# Patient Record
Sex: MALE | Race: WHITE | Employment: UNEMPLOYED | URBAN - METROPOLITAN AREA
[De-identification: names, ages, dates, MRNs, and addresses within clinical notes are randomized per-mention and may not be internally consistent; named-entity substitution may affect disease eponyms.]

---

## 2018-12-02 LAB
ALBUMIN SERPL-MCNC: 3.7 G/DL (ref 3.5–5)
ALBUMIN/GLOB SERPL: 1 {RATIO} (ref 1.1–2.2)
ALP SERPL-CCNC: 67 U/L (ref 45–117)
ALT SERPL-CCNC: 31 U/L (ref 12–78)
ANION GAP SERPL CALC-SCNC: 6 MMOL/L (ref 5–15)
AST SERPL-CCNC: 18 U/L (ref 15–37)
BASOPHILS # BLD: 0.1 K/UL (ref 0–0.1)
BASOPHILS NFR BLD: 1 % (ref 0–1)
BILIRUB SERPL-MCNC: 0.5 MG/DL (ref 0.2–1)
BUN SERPL-MCNC: 15 MG/DL (ref 6–20)
BUN/CREAT SERPL: 15 (ref 12–20)
CALCIUM SERPL-MCNC: 8.8 MG/DL (ref 8.5–10.1)
CHLORIDE SERPL-SCNC: 106 MMOL/L (ref 97–108)
CO2 SERPL-SCNC: 28 MMOL/L (ref 21–32)
CREAT SERPL-MCNC: 1.02 MG/DL (ref 0.7–1.3)
DIFFERENTIAL METHOD BLD: ABNORMAL
EOSINOPHIL # BLD: 0.6 K/UL (ref 0–0.4)
EOSINOPHIL NFR BLD: 4 % (ref 0–7)
ERYTHROCYTE [DISTWIDTH] IN BLOOD BY AUTOMATED COUNT: 13.9 % (ref 11.5–14.5)
GLOBULIN SER CALC-MCNC: 3.8 G/DL (ref 2–4)
GLUCOSE SERPL-MCNC: 89 MG/DL (ref 65–100)
HCT VFR BLD AUTO: 50 % (ref 36.6–50.3)
HGB BLD-MCNC: 16.6 G/DL (ref 12.1–17)
IMM GRANULOCYTES # BLD: 0 K/UL (ref 0–0.04)
IMM GRANULOCYTES NFR BLD AUTO: 0 % (ref 0–0.5)
LYMPHOCYTES # BLD: 3.1 K/UL (ref 0.8–3.5)
LYMPHOCYTES NFR BLD: 22 % (ref 12–49)
MCH RBC QN AUTO: 30.7 PG (ref 26–34)
MCHC RBC AUTO-ENTMCNC: 33.2 G/DL (ref 30–36.5)
MCV RBC AUTO: 92.4 FL (ref 80–99)
MONOCYTES # BLD: 1.1 K/UL (ref 0–1)
MONOCYTES NFR BLD: 8 % (ref 5–13)
NEUTS SEG # BLD: 8.9 K/UL (ref 1.8–8)
NEUTS SEG NFR BLD: 65 % (ref 32–75)
NRBC # BLD: 0 K/UL (ref 0–0.01)
NRBC BLD-RTO: 0 PER 100 WBC
PLATELET # BLD AUTO: 374 K/UL (ref 150–400)
PMV BLD AUTO: 9.9 FL (ref 8.9–12.9)
POTASSIUM SERPL-SCNC: 4.1 MMOL/L (ref 3.5–5.1)
PROT SERPL-MCNC: 7.5 G/DL (ref 6.4–8.2)
RBC # BLD AUTO: 5.41 M/UL (ref 4.1–5.7)
SODIUM SERPL-SCNC: 140 MMOL/L (ref 136–145)
WBC # BLD AUTO: 13.7 K/UL (ref 4.1–11.1)

## 2018-12-02 PROCEDURE — 80053 COMPREHEN METABOLIC PANEL: CPT

## 2018-12-02 PROCEDURE — 99284 EMERGENCY DEPT VISIT MOD MDM: CPT

## 2018-12-02 PROCEDURE — 86850 RBC ANTIBODY SCREEN: CPT

## 2018-12-02 PROCEDURE — 96365 THER/PROPH/DIAG IV INF INIT: CPT

## 2018-12-02 PROCEDURE — 96375 TX/PRO/DX INJ NEW DRUG ADDON: CPT

## 2018-12-02 PROCEDURE — 36415 COLL VENOUS BLD VENIPUNCTURE: CPT

## 2018-12-02 PROCEDURE — 85025 COMPLETE CBC W/AUTO DIFF WBC: CPT

## 2018-12-03 ENCOUNTER — APPOINTMENT (OUTPATIENT)
Dept: CT IMAGING | Age: 58
DRG: 392 | End: 2018-12-03
Attending: EMERGENCY MEDICINE
Payer: MEDICARE

## 2018-12-03 ENCOUNTER — HOSPITAL ENCOUNTER (INPATIENT)
Age: 58
LOS: 3 days | Discharge: HOME | DRG: 392 | End: 2018-12-06
Attending: EMERGENCY MEDICINE | Admitting: INTERNAL MEDICINE
Payer: MEDICARE

## 2018-12-03 DIAGNOSIS — K92.2 ACUTE LOWER GI BLEEDING: Primary | ICD-10-CM

## 2018-12-03 DIAGNOSIS — K52.9 COLITIS: ICD-10-CM

## 2018-12-03 PROBLEM — I10 HTN (HYPERTENSION): Status: ACTIVE | Noted: 2018-12-03

## 2018-12-03 PROBLEM — D72.829 LEUKOCYTOSIS: Status: ACTIVE | Noted: 2018-12-03

## 2018-12-03 LAB
ABO + RH BLD: NORMAL
APTT PPP: 39.4 SEC (ref 22.1–32)
BLOOD GROUP ANTIBODIES SERPL: NORMAL
HCT VFR BLD AUTO: 47.8 % (ref 36.6–50.3)
HCT VFR BLD AUTO: 49.4 % (ref 36.6–50.3)
HEMOCCULT STL QL: POSITIVE
HGB BLD-MCNC: 15.9 G/DL (ref 12.1–17)
HGB BLD-MCNC: 16.5 G/DL (ref 12.1–17)
INR PPP: 1 (ref 0.9–1.1)
LACTATE SERPL-SCNC: 0.5 MMOL/L (ref 0.4–2)
PROTHROMBIN TIME: 10.2 SEC (ref 9–11.1)
SPECIMEN EXP DATE BLD: NORMAL
THERAPEUTIC RANGE,PTTT: ABNORMAL SECS (ref 58–77)
WBC #/AREA STL HPF: NORMAL /HPF (ref 0–4)

## 2018-12-03 PROCEDURE — 74011636320 HC RX REV CODE- 636/320: Performed by: EMERGENCY MEDICINE

## 2018-12-03 PROCEDURE — 74011250637 HC RX REV CODE- 250/637: Performed by: INTERNAL MEDICINE

## 2018-12-03 PROCEDURE — 83605 ASSAY OF LACTIC ACID: CPT

## 2018-12-03 PROCEDURE — 74011250636 HC RX REV CODE- 250/636: Performed by: INTERNAL MEDICINE

## 2018-12-03 PROCEDURE — 85730 THROMBOPLASTIN TIME PARTIAL: CPT

## 2018-12-03 PROCEDURE — 74177 CT ABD & PELVIS W/CONTRAST: CPT

## 2018-12-03 PROCEDURE — 74011250636 HC RX REV CODE- 250/636: Performed by: EMERGENCY MEDICINE

## 2018-12-03 PROCEDURE — 74011250636 HC RX REV CODE- 250/636: Performed by: FAMILY MEDICINE

## 2018-12-03 PROCEDURE — 85014 HEMATOCRIT: CPT

## 2018-12-03 PROCEDURE — 85610 PROTHROMBIN TIME: CPT

## 2018-12-03 PROCEDURE — 36415 COLL VENOUS BLD VENIPUNCTURE: CPT

## 2018-12-03 PROCEDURE — 82272 OCCULT BLD FECES 1-3 TESTS: CPT

## 2018-12-03 PROCEDURE — 89055 LEUKOCYTE ASSESSMENT FECAL: CPT

## 2018-12-03 PROCEDURE — 65270000015 HC RM PRIVATE ONCOLOGY

## 2018-12-03 PROCEDURE — 87449 NOS EACH ORGANISM AG IA: CPT

## 2018-12-03 PROCEDURE — 87046 STOOL CULTR AEROBIC BACT EA: CPT

## 2018-12-03 RX ORDER — METRONIDAZOLE 500 MG/100ML
500 INJECTION, SOLUTION INTRAVENOUS EVERY 8 HOURS
Status: DISCONTINUED | OUTPATIENT
Start: 2018-12-03 | End: 2018-12-03

## 2018-12-03 RX ORDER — FENTANYL CITRATE 50 UG/ML
25 INJECTION, SOLUTION INTRAMUSCULAR; INTRAVENOUS
Status: COMPLETED | OUTPATIENT
Start: 2018-12-03 | End: 2018-12-03

## 2018-12-03 RX ORDER — SODIUM CHLORIDE 0.9 % (FLUSH) 0.9 %
5-10 SYRINGE (ML) INJECTION AS NEEDED
Status: DISCONTINUED | OUTPATIENT
Start: 2018-12-03 | End: 2018-12-06 | Stop reason: HOSPADM

## 2018-12-03 RX ORDER — PANTOPRAZOLE SODIUM 40 MG/1
40 GRANULE, DELAYED RELEASE ORAL
Status: DISCONTINUED | OUTPATIENT
Start: 2018-12-03 | End: 2018-12-06 | Stop reason: HOSPADM

## 2018-12-03 RX ORDER — SODIUM CHLORIDE 0.9 % (FLUSH) 0.9 %
5-10 SYRINGE (ML) INJECTION EVERY 8 HOURS
Status: DISCONTINUED | OUTPATIENT
Start: 2018-12-03 | End: 2018-12-06 | Stop reason: HOSPADM

## 2018-12-03 RX ORDER — ACETAMINOPHEN 325 MG/1
650 TABLET ORAL
Status: DISCONTINUED | OUTPATIENT
Start: 2018-12-03 | End: 2018-12-06 | Stop reason: HOSPADM

## 2018-12-03 RX ORDER — HYDRALAZINE HYDROCHLORIDE 20 MG/ML
5 INJECTION INTRAMUSCULAR; INTRAVENOUS
Status: DISCONTINUED | OUTPATIENT
Start: 2018-12-03 | End: 2018-12-06 | Stop reason: HOSPADM

## 2018-12-03 RX ORDER — SODIUM CHLORIDE 9 MG/ML
50 INJECTION, SOLUTION INTRAVENOUS
Status: COMPLETED | OUTPATIENT
Start: 2018-12-03 | End: 2018-12-03

## 2018-12-03 RX ORDER — SODIUM CHLORIDE 0.9 % (FLUSH) 0.9 %
10 SYRINGE (ML) INJECTION
Status: COMPLETED | OUTPATIENT
Start: 2018-12-03 | End: 2018-12-03

## 2018-12-03 RX ORDER — ONDANSETRON 2 MG/ML
4 INJECTION INTRAMUSCULAR; INTRAVENOUS
Status: DISCONTINUED | OUTPATIENT
Start: 2018-12-03 | End: 2018-12-06 | Stop reason: HOSPADM

## 2018-12-03 RX ORDER — SODIUM CHLORIDE 0.9 % (FLUSH) 0.9 %
5-10 SYRINGE (ML) INJECTION AS NEEDED
Status: DISCONTINUED | OUTPATIENT
Start: 2018-12-03 | End: 2018-12-03

## 2018-12-03 RX ORDER — FENTANYL CITRATE 50 UG/ML
25 INJECTION, SOLUTION INTRAMUSCULAR; INTRAVENOUS
Status: DISCONTINUED | OUTPATIENT
Start: 2018-12-03 | End: 2018-12-06 | Stop reason: HOSPADM

## 2018-12-03 RX ORDER — SODIUM CHLORIDE 9 MG/ML
75 INJECTION, SOLUTION INTRAVENOUS CONTINUOUS
Status: DISCONTINUED | OUTPATIENT
Start: 2018-12-03 | End: 2018-12-06 | Stop reason: HOSPADM

## 2018-12-03 RX ORDER — SODIUM CHLORIDE 0.9 % (FLUSH) 0.9 %
5-10 SYRINGE (ML) INJECTION EVERY 8 HOURS
Status: DISCONTINUED | OUTPATIENT
Start: 2018-12-03 | End: 2018-12-03

## 2018-12-03 RX ORDER — METRONIDAZOLE 500 MG/100ML
500 INJECTION, SOLUTION INTRAVENOUS EVERY 12 HOURS
Status: DISCONTINUED | OUTPATIENT
Start: 2018-12-03 | End: 2018-12-06

## 2018-12-03 RX ORDER — LEVOFLOXACIN 5 MG/ML
750 INJECTION, SOLUTION INTRAVENOUS EVERY 24 HOURS
Status: DISCONTINUED | OUTPATIENT
Start: 2018-12-03 | End: 2018-12-06

## 2018-12-03 RX ADMIN — PANTOPRAZOLE SODIUM 40 MG: 40 GRANULE, DELAYED RELEASE ORAL at 07:57

## 2018-12-03 RX ADMIN — ONDANSETRON 4 MG: 2 INJECTION INTRAMUSCULAR; INTRAVENOUS at 08:19

## 2018-12-03 RX ADMIN — Medication 10 ML: at 15:45

## 2018-12-03 RX ADMIN — METRONIDAZOLE 500 MG: 500 INJECTION, SOLUTION INTRAVENOUS at 19:40

## 2018-12-03 RX ADMIN — FENTANYL CITRATE 25 MCG: 50 INJECTION, SOLUTION INTRAMUSCULAR; INTRAVENOUS at 17:06

## 2018-12-03 RX ADMIN — LEVOFLOXACIN 750 MG: 5 INJECTION, SOLUTION INTRAVENOUS at 06:26

## 2018-12-03 RX ADMIN — FENTANYL CITRATE 25 MCG: 50 INJECTION, SOLUTION INTRAMUSCULAR; INTRAVENOUS at 12:20

## 2018-12-03 RX ADMIN — SODIUM CHLORIDE 50 ML/HR: 900 INJECTION, SOLUTION INTRAVENOUS at 03:59

## 2018-12-03 RX ADMIN — FENTANYL CITRATE 25 MCG: 50 INJECTION, SOLUTION INTRAMUSCULAR; INTRAVENOUS at 21:01

## 2018-12-03 RX ADMIN — Medication 10 ML: at 05:24

## 2018-12-03 RX ADMIN — IOPAMIDOL 100 ML: 755 INJECTION, SOLUTION INTRAVENOUS at 03:59

## 2018-12-03 RX ADMIN — FENTANYL CITRATE 25 MCG: 50 INJECTION, SOLUTION INTRAMUSCULAR; INTRAVENOUS at 05:13

## 2018-12-03 RX ADMIN — Medication 10 ML: at 03:59

## 2018-12-03 RX ADMIN — SODIUM CHLORIDE 1000 ML: 900 INJECTION, SOLUTION INTRAVENOUS at 06:23

## 2018-12-03 RX ADMIN — SODIUM CHLORIDE 75 ML/HR: 900 INJECTION, SOLUTION INTRAVENOUS at 09:24

## 2018-12-03 RX ADMIN — Medication 10 ML: at 22:00

## 2018-12-03 RX ADMIN — FENTANYL CITRATE 25 MCG: 50 INJECTION, SOLUTION INTRAMUSCULAR; INTRAVENOUS at 08:20

## 2018-12-03 RX ADMIN — METRONIDAZOLE 500 MG: 500 INJECTION, SOLUTION INTRAVENOUS at 05:24

## 2018-12-03 NOTE — ED NOTES
Bedside and Verbal shift change report given to Leni (oncoming nurse) by Phu Dugan (offgoing nurse). Report included the following information SBAR, ED Summary, MAR and Recent Results.

## 2018-12-03 NOTE — PROGRESS NOTES
Oncology Interdisciplinary rounds were held today to discuss patient plan of care and outcomes. The following members were present: Nursing, Case Management, Pharmacy and Dietary. Actual Length of Stay: 0 Expected Length of Stay: - - - Plan               Mobility         Discharge Plan Pending CDIF results IVF Gi consult Up ad [facility] Home 12/6

## 2018-12-03 NOTE — PROGRESS NOTES
Pharmacy Automatic Renal Dosing Protocol - Antimicrobials Indication for Antimicrobials: Colitis Current Regimen of Each Antimicrobial: 
Metronidazole 500mg IV q8h  (Start Date 12/3; Day # 1) Levofloxacin 750mg IV q24h; start 12/3; Day #1 Previous Antimicrobial Therapy: 
 
 
Significant Cultures:  
12/3: Stool = (pending) 12/3: C.diff = (pending) Radiology / Imaging results: (X-ray, CT scan or MRI):  
12/3: CT Abd: Proximal infectious versus inflammatory colitis. No abscess. Paralysis, amputations, malnutrition: n/a Labs: 
Recent Labs 18 CREA 1.02  
BUN 15 WBC 13.7* Temp (24hrs), Av.6 °F (37 °C), Min:97.9 °F (36.6 °C), Max:99.3 °F (37.4 °C) Creatinine Clearance (mL/min) or Dialysis: 83 ml/min Impression/Plan:  
· Change Metronidazole to 500mg IV q12h per protocol · Continue Levofloxacin 750mg IV q24h · Antimicrobial stop date TBD Pharmacy will follow daily and adjust medications as appropriate for renal function and/or serum levels. Thank you, 
Denver Balo, [facility] Recommended duration of therapy 
http://[facility]/[place]/[facility]/Pharmacy/Clinical%20Companion/Duration%20of%20ABX%20therapy. docx Renal Dosing 
http://[facility]/[place]/[facility]/Pharmacy/Clinical%20Companion/Renal%[id number]. pdf

## 2018-12-03 NOTE — ED NOTES
TRANSFER - OUT REPORT: 
 
Verbal report given to Rafi PEDRO(name) on [address]  being transferred to [address] [place] [address] (unit) for routine progression of care Report consisted of patients Situation, Background, Assessment and  
Recommendations(SBAR). Information from the following report(s) ED Summary and MAR was reviewed with the receiving nurse. Lines:  
Peripheral IV 12/03/18 Right Antecubital (Active) Site Assessment Clean, dry, & intact 12/3/2018  3:46 AM  
Phlebitis Assessment 0 12/3/2018  3:46 AM  
Infiltration Assessment 0 12/3/2018  3:46 AM  
Dressing Status Clean, dry, & intact 12/3/2018  3:46 AM  
Dressing Type Transparent 12/3/2018  3:46 AM  
Hub Color/Line Status Pink 12/3/2018  3:46 AM  
Action Taken Blood drawn 12/3/2018  3:46 AM  
  
 
Opportunity for questions and clarification was provided. Patient transported with: 
 Monitor not needed for medical admit Will transport via w/c with IVF infusing

## 2018-12-03 NOTE — ED PROVIDER NOTES
EMERGENCY DEPARTMENT HISTORY AND PHYSICAL EXAM 
 
 
Date: 12/3/2018 Patient Name: Raissa Christopher. History of Presenting Illness Chief Complaint Patient presents with  Rectal Bleeding  
  bright red x 3 weeks with L upper abdominal pain History Provided By: Patient HPI: Raissa Meraz is a 60 y.o. male, who presents ambulatory to the ED c/o multiple episodes of hematochezia x3 days. Pt states his sxs initially onset x3 weeks ago with generalized abdominal pain. He states the week prior he had been taking 2-4 Ibuprofen a day for chronic back pain. Pt also notes his first episodes of blood in his stool were dark in color. He states the episodes subsided until last night, prompting his visit to the ED. He reports 5-6 episodes of bright red blood within the past 5 hours of being in the waiting room. He additionally reports associated mild dizziness and mild nausea. Pt notes he is not followed by GI, but did undergo a colonoscopy at [facility] ~9 years ago. Pt denies any hx of hemorrhoids. Pt specifically denies any recent fever, chills, vomiting, diarrhea, CP, SOB, lightheadedness, numbness, weakness, tingling, BLE swelling, HA, heart palpitations, urinary sxs, changes in BM, changes in PO intake, cough, or congestion. PCP: None PMHx: Significant for DDD PSHx: Significant for cholecystectomy Social Hx: +tobacco (1PPD), +EtOH (Occasionally) , -Illicit Drugs There are no other complaints, changes, or physical findings at this time. Past History Past Medical History: 
Past Medical History:  
Diagnosis Date  Ill-defined condition   
 degenerative disc disease Past Surgical History: 
Past Surgical History:  
Procedure Laterality Date  HX GI    
 galbladder removal  
 
 
Family History: 
History reviewed. No pertinent family history. Social History: 
Social History Tobacco Use  Smoking status: Current Every Day Smoker   Packs/day: 1.00  
  Smokeless tobacco: Never Used Substance Use Topics  Alcohol use: Yes Comment: rarely  Drug use: Yes Types: Marijuana Allergies: Allergies Allergen Reactions  Aspirin Other (comments) Nose bleeds  Morphine Other (comments) Agitation Review of Systems Review of Systems Constitutional: Negative for chills and fever. HENT: Negative for congestion, ear pain, rhinorrhea and sore throat. Respiratory: Negative for cough and shortness of breath. Cardiovascular: Negative for chest pain, palpitations and leg swelling. Gastrointestinal: Positive for abdominal pain, blood in stool and nausea. Negative for constipation, diarrhea and vomiting. Melena 
hematochezia Endocrine: Negative for polyuria. Genitourinary: Negative for dysuria, frequency and hematuria. Neurological: Positive for dizziness. Negative for weakness, light-headedness, numbness and headaches. No tingling All other systems reviewed and are negative. Physical Exam  
Physical Exam 
General appearance - overweight, well appearing, and in no distress Eyes - pupils equal and reactive, extraocular eye movements intact ENT - mucous membranes moist, pharynx normal without lesions Neck - supple, no significant adenopathy; non-tender to palpation Chest - clear to auscultation, no wheezes, rales or rhonchi; non-tender to palpation Heart - normal rate and regular rhythm, S1 and S2 normal, no murmurs noted Abdomen - soft, generalized abdominal tenderness, nondistended, no masses or organomegaly Musculoskeletal - no joint tenderness, deformity or swelling; normal ROM Extremities - peripheral pulses normal, no pedal edema Skin - normal coloration and turgor, no rashes Neurological - alert, oriented x3, normal speech, no focal findings or movement disorder noted Written by MELANY Tubbs, as dictated by Tomy Baker MD 
 
Diagnostic Study Results Labs - 
  
 Recent Results (from the past 12 hour(s)) CBC WITH AUTOMATED DIFF Collection Time: 12/02/18  9:23 PM  
Result Value Ref Range WBC 13.7 (H) 4.1 - 11.1 K/uL  
 RBC 5.41 4.10 - 5.70 M/uL  
 HGB 16.6 12.1 - 17.0 g/dL HCT 50.0 36.6 - 50.3 % MCV 92.4 80.0 - 99.0 FL  
 MCH 30.7 26.0 - 34.0 PG  
 MCHC 33.2 30.0 - 36.5 g/dL  
 RDW 13.9 11.5 - 14.5 % PLATELET 043 613 - 687 K/uL MPV 9.9 8.9 - 12.9 FL  
 NRBC 0.0 0  WBC ABSOLUTE NRBC 0.00 0.00 - 0.01 K/uL NEUTROPHILS 65 32 - 75 % LYMPHOCYTES 22 12 - 49 % MONOCYTES 8 5 - 13 % EOSINOPHILS 4 0 - 7 % BASOPHILS 1 0 - 1 % IMMATURE GRANULOCYTES 0 0.0 - 0.5 % ABS. NEUTROPHILS 8.9 (H) 1.8 - 8.0 K/UL  
 ABS. LYMPHOCYTES 3.1 0.8 - 3.5 K/UL  
 ABS. MONOCYTES 1.1 (H) 0.0 - 1.0 K/UL  
 ABS. EOSINOPHILS 0.6 (H) 0.0 - 0.4 K/UL  
 ABS. BASOPHILS 0.1 0.0 - 0.1 K/UL  
 ABS. IMM. GRANS. 0.0 0.00 - 0.04 K/UL  
 DF AUTOMATED METABOLIC PANEL, COMPREHENSIVE Collection Time: 12/02/18  9:23 PM  
Result Value Ref Range Sodium 140 136 - 145 mmol/L Potassium 4.1 3.5 - 5.1 mmol/L Chloride 106 97 - 108 mmol/L  
 CO2 28 21 - 32 mmol/L Anion gap 6 5 - 15 mmol/L Glucose 89 65 - 100 mg/dL BUN 15 6 - 20 MG/DL Creatinine 1.02 0.70 - 1.30 MG/DL  
 BUN/Creatinine ratio 15 12 - 20 GFR est AA >60 >60 ml/min/1.73m2 GFR est non-AA >60 >60 ml/min/1.73m2 Calcium 8.8 8.5 - 10.1 MG/DL Bilirubin, total 0.5 0.2 - 1.0 MG/DL  
 ALT (SGPT) 31 12 - 78 U/L  
 AST (SGOT) 18 15 - 37 U/L Alk. phosphatase 67 45 - 117 U/L Protein, total 7.5 6.4 - 8.2 g/dL Albumin 3.7 3.5 - 5.0 g/dL Globulin 3.8 2.0 - 4.0 g/dL A-G Ratio 1.0 (L) 1.1 - 2.2 TYPE & SCREEN Collection Time: 12/02/18  9:23 PM  
Result Value Ref Range Crossmatch Expiration 12/05/2018 ABO/Rh(D) O POSITIVE Antibody screen NEG PROTHROMBIN TIME + INR Collection Time: 12/03/18  3:47 AM  
Result Value Ref Range INR 1.0 0.9 - 1.1 Prothrombin time 10.2 9.0 - 11.1 sec PTT Collection Time: 12/03/18  3:47 AM  
Result Value Ref Range aPTT 39.4 (H) 22.1 - 32.0 sec  
 aPTT, therapeutic range     58.0 - 77.0 SECS  
OCCULT BLOOD, STOOL Collection Time: 12/03/18  4:55 AM  
Result Value Ref Range Occult blood, stool POSITIVE (A) NEG    
LACTIC ACID Collection Time: 12/03/18  5:20 AM  
Result Value Ref Range Lactic acid 0.5 0.4 - 2.0 MMOL/L Radiologic Studies -  
CT ABD PELV W CONT Final Result CT Results  (Last 48 hours) 12/03/18 0359  CT ABD PELV W CONT Final result Impression:  IMPRESSION:  
Proximal infectious versus inflammatory colitis. No abscess. Narrative:  EXAM:  CT ABD PELV W CONT INDICATION: Left upper quadrant abdominal pain and rectal bleeding for 3 weeks. COMPARISON: None. CONTRAST:  100 mL of Isovue-370. TECHNIQUE:   
Following the uneventful intravenous administration of contrast, thin axial  
images were obtained through the abdomen and pelvis. Coronal and sagittal  
reconstructions were generated. Oral contrast was not administered. CT dose  
reduction was achieved through use of a standardized protocol tailored for this  
examination and automatic exposure control for dose modulation. FINDINGS:   
LUNG BASES: Clear. INCIDENTALLY IMAGED HEART AND MEDIASTINUM: Unremarkable. LIVER: No mass or biliary dilatation. GALLBLADDER: Cholecystectomy. CBD is not dilated. SPLEEN: Normal size and enhancement. PANCREAS: No mass or ductal dilatation. ADRENALS: Unremarkable. KIDNEYS: No mass, calculus, or hydronephrosis. STOMACH: Partial distention with fluid. SMALL BOWEL: No dilatation or wall thickening. COLON: Mural thickening of the colon involves the transverse colon and ascending  
colon. APPENDIX: Unremarkable. PERITONEUM: No ascites or pneumoperitoneum. RETROPERITONEUM: No lymphadenopathy or aortic aneurysm. REPRODUCTIVE ORGANS: Prostate gland is not enlarged. URINARY BLADDER: No mass or calculus. BONES: No destructive bone lesion. ADDITIONAL COMMENTS: N/A  
   
  
  
 
CXR Results  (Last 48 hours) None Medical Decision Making I am the first provider for this patient. I reviewed the vital signs, available nursing notes, past medical history, past surgical history, family history and social history. Vital Signs-Reviewed the patient's vital signs. Patient Vitals for the past 12 hrs: 
 Temp Pulse Resp BP SpO2  
12/03/18 0438     94 % 12/03/18 0437     94 % 12/03/18 0433    (!) 141/114   
12/03/18 0430     91 % 12/03/18 0415    (!) 169/122   
12/03/18 0330    (!) 134/99 94 % 12/03/18 0315    (!) 151/104 94 % 12/02/18 2119 97.9 °F (36.6 °C) 73 16 (!) 157/105 95 % Pulse Oximetry Analysis - 95% on RA Cardiac Monitor:  
Rate: 73bpm 
Rhythm: Normal Sinus Rhythm Records Reviewed: Nursing Notes, Old Medical Records, Previous Radiology Studies and Previous Laboratory Studies Provider Notes (Medical Decision Making): DDx: diverticulitis, AVM, Hemorrhoidal bleeding, bowel obstruction ED Course:  
Initial assessment performed. The patients presenting problems have been discussed, and they are in agreement with the care plan formulated and outlined with them. I have encouraged them to ask questions as they arise throughout their visit. Discussed the risks of smoking and the benefits of smoking cessation as well as the long term sequelae of smoking with the pt who verbalized his understanding. Reviewed strategies for success, including gradually decreasing the number of cigarettes smoked a day. Procedure Note - Rectal Exam:  
4:53 AM 
Performed by: Jordan Rios MD 
Rectal exam performed. Orange colored stool was collected. Stool was collected and sent to the lab for Hemoccult testing. The procedure took 1-15 minutes, and pt tolerated well. CONSULT NOTE:  
5:58 AM 
Parish Miguel MD spoke with Dr. Amanda Conti, Specialty: Hospitalist 
Discussed pt's hx, disposition, and available diagnostic and imaging results. Reviewed care plans. Consultant will evaluate pt for admission. Written by Efren Burnette ED Scribe, as dictated by Parish Miguel MD. Critical Care Time:  
 
none Disposition: 
 
Admit Note: 
5:58 AM 
Pt is being admitted by Dr. Amanda Conti. The results of their tests and reason(s) for their admission have been discussed with pt and/or available family. They convey agreement and understanding for the need to be admitted and for admission diagnosis. PLAN: 
1. Admission to the hospitalist  
 
Diagnosis Clinical Impression: 1. Acute lower GI bleeding 2. Colitis Attestations: This note is prepared by Efren Burnette, acting as Scribe for MD Trista Enamorado MD : The scribe's documentation has been prepared under my direction and personally reviewed by me in its entirety. I confirm that the note above accurately reflects all work, treatment, procedures, and medical decision making performed by me. This note will not be viewable in [address]

## 2018-12-03 NOTE — ED NOTES
Pt reports pain remains across low abd and low back. Requested pain med for 7/10 pain. Pt walked to bathroom with wife for stool specimen

## 2018-12-03 NOTE — PROGRESS NOTES
Oncology Nursing Communication Tool 10:47 AM 
12/3/2018 Bedside shift change report given to Monica Lo RN (incoming nurse) by Mica Wallis (outgoing nurse) on [facility] . . Report included the following information SBAR. Shift Summary: Pt requested PRN pain medication 3x for pain that is 7 out of 10 on pain scale. He rested comfortably for rest of day without complaint and no apparent signs of distress or discomfort. Issues for physician to address: Oncology Shift Note Admission Date 12/3/2018 Admission Diagnosis Colitis [K52.9] Code Status Full Code Consults IP CONSULT TO HOSPITALIST 
IP CONSULT TO GASTROENTEROLOGY Cardiac Monitoring [] Yes [] No  
  
Purposeful Hourly Rounding [] Yes   
Karlee Score Total Score: 0 Karlee score 3 or > [] Bed Alarm [] Avasys [] 1:1 sitter [] Patient refused (Place signed refusal form in chart) Pain Managed [] Yes [] No  
 Key Pain Meds The patient is on no pain meds. Influenza Vaccine Received Flu Vaccine for Current Season (usually Sept-March): Yes Oxygen needs? [] Room air Oxygen @  []1L    []2L    []3L   []4L    []5L   []6L Use home O2? [] Yes [] No 
Perform O2 challenge test using  smartphrase (.oxygenchallenge) Last bowel movement Last Bowel Movement Date: 12/03/18 
bowel movement Urinary Catheter LDAs Peripheral IV 12/03/18 Right Antecubital (Active) Site Assessment Clean, dry, & intact 12/3/2018  9:05 AM  
Phlebitis Assessment 0 12/3/2018  9:05 AM  
Infiltration Assessment 0 12/3/2018  9:05 AM  
Dressing Status Clean, dry, & intact 12/3/2018  9:05 AM  
Dressing Type Transparent;Tape 12/3/2018  9:05 AM  
Hub Color/Line Status Pink 12/3/2018  9:05 AM  
Action Taken Blood drawn 12/3/2018  3:46 AM  
                  
  
Readmission Risk Assessment Tool Score Low Risk   
      
 9 Total Score   
  
 9 Pt. Coverage (Medicare=5 , Medicaid, or Self-Pay=4) Criteria that do not apply:  
 Has Seen PCP in Last 6 Months (Yes=3, No=0) . Living with Significant Other. Assisted Living. LTAC. SNF. or  
Rehab Patient Length of Stay (>5 days = 3) IP Visits Last 12 Months (1-3=4, 4=9, >4=11) Charlson Comorbidity Score (Age + Comorbid Conditions) Expected Length of Stay - - - Actual Length of Stay 0 Doris Morse

## 2018-12-03 NOTE — ED NOTES
Pt reports having stomach pain in lower abdomen that wraps around. Pt reports having degenerative disc disease and that he has been having a flare up for 3 weeks. Pt reports rectal bleeding started 3 weeks ago and that it is currently bright red.

## 2018-12-03 NOTE — PROGRESS NOTES
Problem: Falls - Risk of 
Goal: *Absence of Falls Document Gonzalo Kins Fall Risk and appropriate interventions in the flowsheet. Outcome: Progressing Towards Goal 
Fall Risk Interventions: 
  
 
  
 
Medication Interventions: Patient to call before getting OOB, Teach patient to arise slowly

## 2018-12-03 NOTE — CONSULTS
GI Consultation Note Louie Rowell)    NAME: Ubaldo Rodriguez. : 1960 MRN: [id number]   ATTG: TOBIN Ray MD PCP: None  Date/Time:  12/3/2018 12:10 PM  Subjective:   REASON FOR CONSULT:      Danielle Schmid is a 60 y.o.  male who I was asked to see for abnl CT with noted bloody diarrhea. He presented with 3wk hx generalized crampy abdominal discomfort, worsening over the last few days, becoming associated with sparse and then more profuse diarrhea and bleeding. He denies improvement with sporadic use of NSAIDs as OP that he was using for back pain. He last had antibiotics as OP >9mo ago. .  . Pt also reported alternating contipation and diarrhea for past 2 weeks. He denies F/C, N/V, CP, SOB, or hematemesis. He last underwent colonoscopy >9yrs ago as OP for routine CRC screening that was reported as unremarkable. He denies sick exposires, recent travel, or raw foods. ER eval noted colitis by CT scan and leukocytosis. Past Medical History:   Diagnosis Date    HTN (hypertension) 12/3/2018    Ill-defined condition     degenerative disc disease      Past Surgical History:   Procedure Laterality Date    HX GI      galbladder removal     Social History     Tobacco Use    Smoking status: Current Every Day Smoker     Packs/day: 1.00    Smokeless tobacco: Never Used   Substance Use Topics    Alcohol use: Yes     Comment: rarely      History reviewed. No pertinent family history.    Allergies   Allergen Reactions    Aspirin Other (comments)     Nose bleeds    Morphine Other (comments)     Agitation      Home Medications:  None     Hospital medications:  Current Facility-Administered Medications   Medication Dose Route Frequency    levoFLOXacin (LEVAQUIN) 750 mg in D5W IVPB  750 mg IntraVENous Q24H    pantoprazole (PROTONIX) granules for oral suspension 40 mg  40 mg Oral ACB    0.9% sodium chloride infusion  75 mL/hr IntraVENous CONTINUOUS    sodium chloride (NS) flush 5-10 mL  5-10 mL IntraVENous Q8H    sodium chloride (NS) flush 5-10 mL  5-10 mL IntraVENous PRN    acetaminophen (TYLENOL) tablet 650 mg  650 mg Oral Q6H PRN    ondansetron (ZOFRAN) injection 4 mg  4 mg IntraVENous Q4H PRN    fentaNYL citrate (PF) injection 25 mcg  25 mcg IntraVENous Q4H PRN    hydrALAZINE (APRESOLINE) 20 mg/mL injection 5 mg  5 mg IntraVENous Q6H PRN    metroNIDAZOLE (FLAGYL) IVPB premix 500 mg  500 mg IntraVENous Q12H     REVIEW OF SYSTEMS:     [x]    Total of 11 systems reviewed as follows:  Const:   negative fever, negative chills, negative weight loss  Eyes:   negative diplopia or visual changes, negative eye pain  ENT:   negative coryza, negative sore throat  Resp:   negative cough, hemoptysis, dyspnea  Cards:  negative for chest pain, palpitations, lower extremity edema  :  negative for frequency, dysuria and hematuria  Skin:   negative for rash and pruritus  Heme:   negative for easy bruising and gum/nose bleeding  MS:  negative for myalgias, arthralgias, back pain and muscle weakness  Neurolo:  negative for headaches, dizziness, vertigo, memory problems   Psych:  negative for feelings of anxiety, depression     Pertinent Positives include :    Objective:   VITALS:    Visit Vitals  BP (!) 146/92 (BP 1 Location: Left arm, BP Patient Position: At rest)   Pulse 83   Temp 99.3 °F (37.4 °C)   Resp 18   Ht 5' 11\" (1.803 m)   Wt 96.6 kg (212 lb 15.4 oz)   SpO2 95%   BMI 29.70 kg/m²     Temp (24hrs), Av.6 °F (37 °C), Min:97.9 °F (36.6 °C), Max:99.3 °F (37.4 °C)    PHYSICAL EXAM:   General:    Alert, cooperative, no distress, appears stated age. Head:   Normocephalic, without obvious abnormality, atraumatic. Eyes:   Conjunctivae clear, anicteric sclerae. Pupils are equal  Nose:  Nares normal. No drainage or sinus tenderness. Throat:    Lips, mucosa, and tongue normal.  No Thrush  Neck:  Supple, symmetrical,  no adenopathy, thyroid: non tender  Back:    Symmetric,  No CVA tenderness. Lungs:   CTA bilaterally.   No wheezing/rhonchi/rales. Chest wall:  No tenderness or deformity. No Accessory muscle use. Heart:   Regular rate and rhythm,  no murmur, rub or gallop. Abdomen:   S, +Generalized abd tenderness. +ML scar. ND.  NABS. No masses  Extremities: Atraumatic, No cyanosis. No edema. No clubbing  Skin:     Texture, turgor normal. No rashes/lesions/jaundice  Lymph: Cervical, supraclavicular normal.  Psych:  Good insight. Not depressed. Not anxious or agitated. Neurologic: EOMs intact. No facial asymmetry/aphasia/slurred speech. Normal strength, A/O X 3. LAB DATA REVIEWED:    Recent Results (from the past 48 hour(s))   CBC WITH AUTOMATED DIFF    Collection Time: 12/02/18  9:23 PM   Result Value Ref Range    WBC 13.7 (H) 4.1 - 11.1 K/uL    RBC 5.41 4.10 - 5.70 M/uL    HGB 16.6 12.1 - 17.0 g/dL    HCT 50.0 36.6 - 50.3 %    MCV 92.4 80.0 - 99.0 FL    MCH 30.7 26.0 - 34.0 PG    MCHC 33.2 30.0 - 36.5 g/dL    RDW 13.9 11.5 - 14.5 %    PLATELET 115 260 - 852 K/uL    MPV 9.9 8.9 - 12.9 FL    NRBC 0.0 0  WBC    ABSOLUTE NRBC 0.00 0.00 - 0.01 K/uL    NEUTROPHILS 65 32 - 75 %    LYMPHOCYTES 22 12 - 49 %    MONOCYTES 8 5 - 13 %    EOSINOPHILS 4 0 - 7 %    BASOPHILS 1 0 - 1 %    IMMATURE GRANULOCYTES 0 0.0 - 0.5 %    ABS. NEUTROPHILS 8.9 (H) 1.8 - 8.0 K/UL    ABS. LYMPHOCYTES 3.1 0.8 - 3.5 K/UL    ABS. MONOCYTES 1.1 (H) 0.0 - 1.0 K/UL    ABS. EOSINOPHILS 0.6 (H) 0.0 - 0.4 K/UL    ABS. BASOPHILS 0.1 0.0 - 0.1 K/UL    ABS. IMM.  GRANS. 0.0 0.00 - 0.04 K/UL    DF AUTOMATED     METABOLIC PANEL, COMPREHENSIVE    Collection Time: 12/02/18  9:23 PM   Result Value Ref Range    Sodium 140 136 - 145 mmol/L    Potassium 4.1 3.5 - 5.1 mmol/L    Chloride 106 97 - 108 mmol/L    CO2 28 21 - 32 mmol/L    Anion gap 6 5 - 15 mmol/L    Glucose 89 65 - 100 mg/dL    BUN 15 6 - 20 MG/DL    Creatinine 1.02 0.70 - 1.30 MG/DL    BUN/Creatinine ratio 15 12 - 20      GFR est AA >60 >60 ml/min/1.73m2    GFR est non-AA >60 >60 ml/min/1.73m2    Calcium 8.8 8.5 - 10.1 MG/DL    Bilirubin, total 0.5 0.2 - 1.0 MG/DL    ALT (SGPT) 31 12 - 78 U/L    AST (SGOT) 18 15 - 37 U/L    Alk. phosphatase 67 45 - 117 U/L    Protein, total 7.5 6.4 - 8.2 g/dL    Albumin 3.7 3.5 - 5.0 g/dL    Globulin 3.8 2.0 - 4.0 g/dL    A-G Ratio 1.0 (L) 1.1 - 2.2     TYPE & SCREEN    Collection Time: 12/02/18  9:23 PM   Result Value Ref Range    Crossmatch Expiration 12/05/2018     ABO/Rh(D) Danika Newby POSITIVE     Antibody screen NEG    PROTHROMBIN TIME + INR    Collection Time: 12/03/18  3:47 AM   Result Value Ref Range    INR 1.0 0.9 - 1.1      Prothrombin time 10.2 9.0 - 11.1 sec   PTT    Collection Time: 12/03/18  3:47 AM   Result Value Ref Range    aPTT 39.4 (H) 22.1 - 32.0 sec    aPTT, therapeutic range     58.0 - 77.0 SECS   OCCULT BLOOD, STOOL    Collection Time: 12/03/18  4:55 AM   Result Value Ref Range    Occult blood, stool POSITIVE (A) NEG     LACTIC ACID    Collection Time: 12/03/18  5:20 AM   Result Value Ref Range    Lactic acid 0.5 0.4 - 2.0 MMOL/L   HGB & HCT    Collection Time: 12/03/18  6:58 AM   Result Value Ref Range    HGB 16.5 12.1 - 17.0 g/dL    HCT 49.4 36.6 - 50.3 %     IMAGING RESULTS:   [x]      I have personally reviewed the actual   []    CXR  [x]    CT  []     US  CT ABD PELV W CONT 12/3/18  FINDINGS:   LUNG BASES: Clear. INCIDENTALLY IMAGED HEART AND MEDIASTINUM: Unremarkable. LIVER: No mass or biliary dilatation. GALLBLADDER: Cholecystectomy. CBD is not dilated. SPLEEN: Normal size and enhancement. PANCREAS: No mass or ductal dilatation. ADRENALS: Unremarkable. KIDNEYS: No mass, calculus, or hydronephrosis. STOMACH: Partial distention with fluid. SMALL BOWEL: No dilatation or wall thickening. COLON: Mural thickening of the colon involves the transverse colon and ascending  colon. APPENDIX: Unremarkable. PERITONEUM: No ascites or pneumoperitoneum. RETROPERITONEUM: No lymphadenopathy or aortic aneurysm.   REPRODUCTIVE ORGANS: Prostate gland is not enlarged. URINARY BLADDER: No mass or calculus. BONES: No destructive bone lesion. IMPRESSION:  Proximal infectious versus inflammatory colitis. No abscess. Recommendations/Plan:      Active Problems:    Colitis (12/3/2018)      HTN (hypertension) (12/3/2018)      Leukocytosis (12/3/2018)       ___________________________________________________  RECOMMENDATIONS:    62yo M with abnl CT associated with generalized abd pain, leukocytosis, and bloody diarrhea. Suspect an infectious or ischemic colitis as source. CA or IBD less likely.   Plan:  1) IVF  2) CLD  3) IV Flagyl and Levaquin  4) Check stool  5) Check stool for fecal WBC, Cx, O&P, Cdif  6) Follow H/H  7) PRN narcotic analgesia  8) Consider for OP colonoscopy    Discussed Code Status:    [x]    Full Code      []    DNR    ___________________________________________________  Care Plan discussed with:    [x]    Patient   []    Family   []    Nursing   []    Attending  Total Time :  50   minutes   ___________________________________________________  GI: Benoit Carrillo MD

## 2018-12-04 LAB
ALBUMIN SERPL-MCNC: 3.5 G/DL (ref 3.5–5)
ALBUMIN/GLOB SERPL: 0.9 {RATIO} (ref 1.1–2.2)
ALP SERPL-CCNC: 67 U/L (ref 45–117)
ALT SERPL-CCNC: 27 U/L (ref 12–78)
ANION GAP SERPL CALC-SCNC: 7 MMOL/L (ref 5–15)
AST SERPL-CCNC: 18 U/L (ref 15–37)
BASOPHILS # BLD: 0.1 K/UL (ref 0–0.1)
BASOPHILS NFR BLD: 1 % (ref 0–1)
BILIRUB SERPL-MCNC: 0.7 MG/DL (ref 0.2–1)
BUN SERPL-MCNC: 8 MG/DL (ref 6–20)
BUN/CREAT SERPL: 9 (ref 12–20)
C DIFF GDH STL QL: NEGATIVE
C DIFF TOX A+B STL QL IA: NEGATIVE
CALCIUM SERPL-MCNC: 9 MG/DL (ref 8.5–10.1)
CHLORIDE SERPL-SCNC: 109 MMOL/L (ref 97–108)
CO2 SERPL-SCNC: 25 MMOL/L (ref 21–32)
CREAT SERPL-MCNC: 0.92 MG/DL (ref 0.7–1.3)
DIFFERENTIAL METHOD BLD: ABNORMAL
EOSINOPHIL # BLD: 0.5 K/UL (ref 0–0.4)
EOSINOPHIL NFR BLD: 5 % (ref 0–7)
ERYTHROCYTE [DISTWIDTH] IN BLOOD BY AUTOMATED COUNT: 13.6 % (ref 11.5–14.5)
GLOBULIN SER CALC-MCNC: 3.7 G/DL (ref 2–4)
GLUCOSE SERPL-MCNC: 106 MG/DL (ref 65–100)
HCT VFR BLD AUTO: 49.7 % (ref 36.6–50.3)
HGB BLD-MCNC: 16.5 G/DL (ref 12.1–17)
IMM GRANULOCYTES # BLD: 0 K/UL (ref 0–0.04)
IMM GRANULOCYTES NFR BLD AUTO: 0 % (ref 0–0.5)
INTERPRETATION: NORMAL
LYMPHOCYTES # BLD: 1.9 K/UL (ref 0.8–3.5)
LYMPHOCYTES NFR BLD: 19 % (ref 12–49)
MAGNESIUM SERPL-MCNC: 2.3 MG/DL (ref 1.6–2.4)
MCH RBC QN AUTO: 30.3 PG (ref 26–34)
MCHC RBC AUTO-ENTMCNC: 33.2 G/DL (ref 30–36.5)
MCV RBC AUTO: 91.2 FL (ref 80–99)
MONOCYTES # BLD: 0.9 K/UL (ref 0–1)
MONOCYTES NFR BLD: 9 % (ref 5–13)
NEUTS SEG # BLD: 6.7 K/UL (ref 1.8–8)
NEUTS SEG NFR BLD: 67 % (ref 32–75)
NRBC # BLD: 0 K/UL (ref 0–0.01)
NRBC BLD-RTO: 0 PER 100 WBC
PHOSPHATE SERPL-MCNC: 3.4 MG/DL (ref 2.6–4.7)
PLATELET # BLD AUTO: 348 K/UL (ref 150–400)
PMV BLD AUTO: 10.3 FL (ref 8.9–12.9)
POTASSIUM SERPL-SCNC: 3.9 MMOL/L (ref 3.5–5.1)
PROT SERPL-MCNC: 7.2 G/DL (ref 6.4–8.2)
RBC # BLD AUTO: 5.45 M/UL (ref 4.1–5.7)
SODIUM SERPL-SCNC: 141 MMOL/L (ref 136–145)
WBC # BLD AUTO: 10 K/UL (ref 4.1–11.1)

## 2018-12-04 PROCEDURE — 36415 COLL VENOUS BLD VENIPUNCTURE: CPT

## 2018-12-04 PROCEDURE — 74011250637 HC RX REV CODE- 250/637: Performed by: INTERNAL MEDICINE

## 2018-12-04 PROCEDURE — 74011250636 HC RX REV CODE- 250/636: Performed by: FAMILY MEDICINE

## 2018-12-04 PROCEDURE — 84100 ASSAY OF PHOSPHORUS: CPT

## 2018-12-04 PROCEDURE — 85025 COMPLETE CBC W/AUTO DIFF WBC: CPT

## 2018-12-04 PROCEDURE — 74011250636 HC RX REV CODE- 250/636: Performed by: INTERNAL MEDICINE

## 2018-12-04 PROCEDURE — 65270000015 HC RM PRIVATE ONCOLOGY

## 2018-12-04 PROCEDURE — 74011250636 HC RX REV CODE- 250/636: Performed by: EMERGENCY MEDICINE

## 2018-12-04 PROCEDURE — 80053 COMPREHEN METABOLIC PANEL: CPT

## 2018-12-04 PROCEDURE — 83735 ASSAY OF MAGNESIUM: CPT

## 2018-12-04 RX ADMIN — FENTANYL CITRATE 25 MCG: 50 INJECTION, SOLUTION INTRAMUSCULAR; INTRAVENOUS at 03:13

## 2018-12-04 RX ADMIN — FENTANYL CITRATE 25 MCG: 50 INJECTION, SOLUTION INTRAMUSCULAR; INTRAVENOUS at 07:51

## 2018-12-04 RX ADMIN — FENTANYL CITRATE 25 MCG: 50 INJECTION, SOLUTION INTRAMUSCULAR; INTRAVENOUS at 19:43

## 2018-12-04 RX ADMIN — METRONIDAZOLE 500 MG: 500 INJECTION, SOLUTION INTRAVENOUS at 19:42

## 2018-12-04 RX ADMIN — SODIUM CHLORIDE 75 ML/HR: 900 INJECTION, SOLUTION INTRAVENOUS at 13:10

## 2018-12-04 RX ADMIN — Medication 10 ML: at 06:00

## 2018-12-04 RX ADMIN — ONDANSETRON 4 MG: 2 INJECTION INTRAMUSCULAR; INTRAVENOUS at 03:23

## 2018-12-04 RX ADMIN — FENTANYL CITRATE 25 MCG: 50 INJECTION, SOLUTION INTRAMUSCULAR; INTRAVENOUS at 11:53

## 2018-12-04 RX ADMIN — PANTOPRAZOLE SODIUM 40 MG: 40 GRANULE, DELAYED RELEASE ORAL at 07:53

## 2018-12-04 RX ADMIN — METRONIDAZOLE 500 MG: 500 INJECTION, SOLUTION INTRAVENOUS at 07:55

## 2018-12-04 RX ADMIN — FENTANYL CITRATE 25 MCG: 50 INJECTION, SOLUTION INTRAMUSCULAR; INTRAVENOUS at 15:56

## 2018-12-04 RX ADMIN — FENTANYL CITRATE 25 MCG: 50 INJECTION, SOLUTION INTRAMUSCULAR; INTRAVENOUS at 23:51

## 2018-12-04 RX ADMIN — LEVOFLOXACIN 750 MG: 5 INJECTION, SOLUTION INTRAVENOUS at 05:59

## 2018-12-04 NOTE — PROGRESS NOTES
Oncology Nursing Communication Tool 6:54 AM 
12/4/2018 Bedside shift change report given to Holli Wilson (incoming nurse) by Kasey Syed (outgoing nurse) on [facility]. . Report included the following information SBAR, Kardex, Intake/Output, MAR and Recent Results. Shift Summary: fentanyl given x2 for abdominal pain. Iv abx given. Issues for physician to address: none Oncology Shift Note Admission Date 12/3/2018 Admission Diagnosis Colitis [K52.9] Code Status Full Code Consults IP CONSULT TO HOSPITALIST 
IP CONSULT TO GASTROENTEROLOGY Cardiac Monitoring [] Yes [x] No  
  
Purposeful Hourly Rounding [x] Yes   
Karlee Score Total Score: 1 Karlee score 3 or > [] Bed Alarm [] Avasys [] 1:1 sitter [] Patient refused (Place signed refusal form in chart) Pain Managed [] Yes [] No  
 Key Pain Meds The patient is on no pain meds. Influenza Vaccine Received Flu Vaccine for Current Season (usually Sept-March): Yes Oxygen needs? [x] Room air Oxygen @  []1L    []2L    []3L   []4L    []5L   []6L Use home O2? [] Yes [] No 
Perform O2 challenge test using  smartphrase (.oxygenchallenge) Last bowel movement Last Bowel Movement Date: 12/03/18 
bowel movement Urinary Catheter LDAs Peripheral IV 12/03/18 Right Antecubital (Active) Site Assessment Clean, dry, & intact 12/4/2018  3:24 AM  
Phlebitis Assessment 0 12/4/2018  3:24 AM  
Infiltration Assessment 0 12/4/2018  3:24 AM  
Dressing Status Clean, dry, & intact 12/4/2018  3:24 AM  
Dressing Type Tape;Transparent 12/4/2018  3:24 AM  
Hub Color/Line Status Pink; Infusing 12/4/2018  3:24 AM  
Action Taken Blood drawn 12/3/2018  3:46 AM  
                  
  
Readmission Risk Assessment Tool Score Low Risk   
      
 9 Total Score   
  
 9 Pt. Coverage (Medicare=5 , Medicaid, or Self-Pay=4) Criteria that do not apply: Has Seen PCP in Last 6 Months (Yes=3, No=0) . Living with Significant Other. Assisted Living. LTAC. SNF. or  
Rehab Patient Length of Stay (>5 days = 3) IP Visits Last 12 Months (1-3=4, 4=9, >4=11) Charlson Comorbidity Score (Age + Comorbid Conditions) Expected Length of Stay 2d 14h Actual Length of Stay 1 Wei Dailey

## 2018-12-04 NOTE — PROGRESS NOTES
GI Progress Note Katy Suggs) [address] [place]KEON:[birth year] [facility]:[id number] ATTG: DO Feliciano  PCP: None 
Date/Time:  12/4/2018 12:19 PM  
Assessment: · Abd pain- c/w colitis involving Tc, but cx negative to date · Hematochezia- resolved · Diarrhea- resolved · Abnl CT Plan:  
· Continue IV abx · Will allow Full Liquid diet · Would offer colonoscopy as OP about 2-4wks after completing abx Subjective:  
Discussed with RN events overnight. Last blood stool at 8pm last night. No further diarrhea. Hungry. No increased pain with CLD Complaint Y/N Description Abdominal Pain y Hematemesis n Hematochezia n Melena n   
Constipation n   
Diarrhea n Dyspepsia n Dysphagia n   
Jaundiced n   
Nausea/vomiting n Review of Systems: 
Symptom Y/N Comments  Symptom Y/N Comments Fever/Chills n   Chest Pain n   
Cough n   Headaches n Sputum n   Joint Pain n   
SOB/YANG n   Pruritis/Rash n Tolerating Diet y CLD  Other Could NOT obtain due to:   
 
Objective: VITALS:  
Last 24hrs VS reviewed since prior progress note. Most recent are: 
Visit Vitals /87 Pulse 66 Temp 98.4 °F (36.9 °C) Resp 16 Ht 5' 11\" (1.803 m) Wt 96.6 kg (212 lb 15.4 oz) SpO2 95% BMI 29.70 kg/m² Intake/Output Summary (Last 24 hours) at 12/4/2018 1219 Last data filed at 12/4/2018 1045 Gross per 24 hour Intake 2080 ml Output  Net 2080 ml PHYSICAL EXAM: 
General: WD, WN. Alert, cooperative, no acute distress   
HEENT: NC, Atraumatic. PERRL. Anicteric sclerae. Lungs:  CTA Bilaterally. No Wheezing/Rhonchi/Rales. Heart:  Regular  rhythm,  No murmur/Rub/Gallops Abdomen: Soft, Non distended, Non tender.  +BS Extremities: No c/c/e Neurologic:  CN 2-12 gi, A/O X 3. No acute neurological distress Psych:   Good insight. Not anxious nor agitated. Lab and Radiology Data Reviewed: (see below) C.dif negative Stool Cx negative Fecal WBC normal 
Hemoccult positive Medications Reviewed: (see below) PMH/SH reviewed - no change compared to H&P 
________________________________________________________________________ Total time spent with patient: 15 minutes ________________________________________________________________________ Care Plan discussed with: 
Patient y Family  y  
RN y  
           Consultant:    
 
Molly Littlejohn MD  
 
Procedures: see electronic medical records for all procedures/Xrays and details which were not copied into this note but were reviewed prior to creation of Plan. LABS: 
Recent Labs 12/04/18 
6553 12/03/18 1403 12/02/18 2123 WBC 10.0  --   --  13.7* HGB 16.5 15.9   < > 16.6 HCT 49.7 47.8   < > 50.0   --   --  374  
 < > = values in this interval not displayed. Recent Labs 12/04/18 0317 12/02/18 2123  140  
K 3.9 4.1 * 106 CO2 25 28 BUN 8 15 CREA 0.92 1.02  
* 89  
CA 9.0 8.8 MG 2.3  --   
PHOS 3.4  --   
 
Recent Labs 12/04/18 0317 12/02/18 2123 SGOT 18 18 AP 67 67  
TP 7.2 7.5 ALB 3.5 3.7 GLOB 3.7 3.8 Recent Labs 12/03/18 
8063 INR 1.0 PTP 10.2 APTT 39.4* No results for input(s): FE, TIBC, PSAT, FERR in the last 72 hours. No results found for: FOL, RBCF No results for input(s): PH, PCO2, PO2 in the last 72 hours. No results for input(s): CPK, CKMB in the last 72 hours. No lab exists for component: TROPONINI No results found for: COLOR, APPRN, SPGRU, REFSG, JALEN, PROTU, GLUCU, KETU, BILU, UROU, DG, LEUKU, GLUKE, EPSU, BACTU, WBCU, RBCU, CASTS, UCRY MEDICATIONS: 
Current Facility-Administered Medications Medication Dose Route Frequency  levoFLOXacin (LEVAQUIN) 750 mg in D5W IVPB  750 mg IntraVENous Q24H  pantoprazole (PROTONIX) granules for oral suspension 40 mg  40 mg Oral ACB  
 0.9% sodium chloride infusion  75 mL/hr IntraVENous CONTINUOUS  
 sodium chloride (NS) flush 5-10 mL  5-10 mL IntraVENous Q8H  
  sodium chloride (NS) flush 5-10 mL  5-10 mL IntraVENous PRN  
 acetaminophen (TYLENOL) tablet 650 mg  650 mg Oral Q6H PRN  
 ondansetron (ZOFRAN) injection 4 mg  4 mg IntraVENous Q4H PRN  
 fentaNYL citrate (PF) injection 25 mcg  25 mcg IntraVENous Q4H PRN  
 hydrALAZINE (APRESOLINE) 20 mg/mL injection 5 mg  5 mg IntraVENous Q6H PRN  
 metroNIDAZOLE (FLAGYL) IVPB premix 500 mg  500 mg IntraVENous Q12H

## 2018-12-04 NOTE — PROGRESS NOTES
Hospitalist Progress Note NAME: Karla Barbosa. :  1960 MRN:  [id number] Assessment / Plan: 
Colitis with Hematochezia, Abdominal Pain and leukocytosis Infectious vs ischemic colitis, improving w abx so suspect more likely infective colitis Appreciate GI consult, cont current Tx with IV abx Will need colonoscopy, can likely been done OP following Tx Hematochezia resolving, leukocytosis resolving PRN fentanyl for abd pain Tolerating clears, advance diet to full liquids per GI Cont gentle IVF 
H/H stable CT A/P Proximal infectious versus inflammatory colitis. No abscess. 
  
HTN (hypertension) Claims to be taken off meds in the past 
Will place PRN nitropaste Elevated BP could be due to pain and stress Monitor, add meds PRN 
  
Smoker Counseled to quit Pt deferred nicotine patch at this time 
  
Code Status: Full Code Surrogate Decision Maker: Wife 
  
DVT Prophylaxis: SCDs GI Prophylaxis: not indicated 
  
Baseline: functional  
 
Subjective: Chief Complaint / Reason for Physician Visit Pt tolerating clears, no n/v. Persistent intermittent abd pain Review of Systems: 
Symptom Y/N Comments  Symptom Y/N Comments Fever/Chills n   Chest Pain n   
Poor Appetite n   Edema n   
Cough n   Abdominal Pain y Sputum n   Joint Pain SOB/YANG n   Pruritis/Rash Nausea/vomit n   Tolerating PT/OT Diarrhea y intermittenti  Tolerating Diet n   
Constipation y intermittent  Other Could NOT obtain due to:   
 
Objective: VITALS:  
Last 24hrs VS reviewed since prior progress note. Most recent are: 
Patient Vitals for the past 24 hrs: 
 Temp Pulse Resp BP SpO2  
18 0748 98.4 °F (36.9 °C) 66 16 129/87 95 % 18 2319 97.9 °F (36.6 °C) 70 18 (!) 142/99 99 % 18 1948 98.2 °F (36.8 °C) 69 18 138/89 95 % 18 1506 98.3 °F (36.8 °C) 70 18 148/90 94 % Intake/Output Summary (Last 24 hours) at 2018 1231 Last data filed at 2018 0023 Gross per 24 hour Intake 2080 ml Output  Net 2080 ml PHYSICAL EXAM: 
General: WD, WN. Alert, cooperative, no acute distress   
EENT:  EOMI. Anicteric sclerae. MMM Resp:  CTA bilaterally, no wheezing or rales. No accessory muscle use CV:  Regular  rhythm,  No edema GI:  Firm, non-distended, +mod tenderness, +Bowel sounds Neurologic:  Alert and oriented X 3, normal speech, Psych:   Good insight. Not anxious nor agitated Skin:  No rashes. No jaundice Reviewed most current lab test results and cultures  YES Reviewed most current radiology test results   YES Review and summation of old records today    NO Reviewed patient's current orders and MAR    YES 
PMH/SH reviewed - no change compared to H&P 
________________________________________________________________________ Care Plan discussed with: 
  Comments Patient x Family  x   
RN x Care Manager Consultant Multidiciplinary team rounds were held today with , nursing, pharmacist and clinical coordinator. Patient's plan of care was discussed; medications were reviewed and discharge planning was addressed. ________________________________________________________________________ Total NON critical care TIME: 25   Minutes Total CRITICAL CARE TIME Spent:   Minutes non procedure based Comments >50% of visit spent in counseling and coordination of care    
________________________________________________________________________ Cherie Gu, DO  
 
Procedures: see electronic medical records for all procedures/Xrays and details which were not copied into this note but were reviewed prior to creation of Plan. LABS: 
I reviewed today's most current labs and imaging studies. Pertinent labs include: 
Recent Labs 12/04/18 
0612 12/03/18 
1403 12/03/18 
8287 12/02/18 
2123 WBC 10.0  --   --  13.7* HGB 16.5 15.9 16.5 16.6 HCT 49.7 47.8 49.4 50.0   --   --  374 Recent Labs 12/04/18 
0984 12/03/18 
0347 12/02/18 
2123   --  140  
K 3.9  --  4.1 *  --  106 CO2 25  --  28  
*  --  89 BUN 8  --  15  
CREA 0.92  --  1.02  
CA 9.0  --  8.8 MG 2.3  --   --   
PHOS 3.4  --   --   
ALB 3.5  --  3.7 TBILI 0.7  --  0.5 SGOT 18  --  18 ALT 27  --  31 INR  --  1.0  --   
 
 
Signed: Delwin Hammans Broaddus, DO

## 2018-12-05 LAB
BACTERIA SPEC CULT: NORMAL
C JEJUNI+C COLI AG STL QL: NEGATIVE
E COLI SXT1+2 STL IA: NEGATIVE
SERVICE CMNT-IMP: NORMAL

## 2018-12-05 PROCEDURE — 74011250636 HC RX REV CODE- 250/636: Performed by: INTERNAL MEDICINE

## 2018-12-05 PROCEDURE — 74011250636 HC RX REV CODE- 250/636: Performed by: EMERGENCY MEDICINE

## 2018-12-05 PROCEDURE — 74011250637 HC RX REV CODE- 250/637: Performed by: INTERNAL MEDICINE

## 2018-12-05 PROCEDURE — 74011250636 HC RX REV CODE- 250/636: Performed by: FAMILY MEDICINE

## 2018-12-05 PROCEDURE — 65270000015 HC RM PRIVATE ONCOLOGY

## 2018-12-05 RX ORDER — AMLODIPINE BESYLATE 5 MG/1
5 TABLET ORAL DAILY
Status: DISCONTINUED | OUTPATIENT
Start: 2018-12-06 | End: 2018-12-06 | Stop reason: HOSPADM

## 2018-12-05 RX ADMIN — Medication 10 ML: at 07:12

## 2018-12-05 RX ADMIN — SODIUM CHLORIDE 75 ML/HR: 900 INJECTION, SOLUTION INTRAVENOUS at 19:48

## 2018-12-05 RX ADMIN — FENTANYL CITRATE 25 MCG: 50 INJECTION, SOLUTION INTRAMUSCULAR; INTRAVENOUS at 22:19

## 2018-12-05 RX ADMIN — FENTANYL CITRATE 25 MCG: 50 INJECTION, SOLUTION INTRAMUSCULAR; INTRAVENOUS at 17:55

## 2018-12-05 RX ADMIN — METRONIDAZOLE 500 MG: 500 INJECTION, SOLUTION INTRAVENOUS at 07:45

## 2018-12-05 RX ADMIN — FENTANYL CITRATE 25 MCG: 50 INJECTION, SOLUTION INTRAMUSCULAR; INTRAVENOUS at 04:43

## 2018-12-05 RX ADMIN — Medication 10 ML: at 21:11

## 2018-12-05 RX ADMIN — METRONIDAZOLE 500 MG: 500 INJECTION, SOLUTION INTRAVENOUS at 21:11

## 2018-12-05 RX ADMIN — PANTOPRAZOLE SODIUM 40 MG: 40 GRANULE, DELAYED RELEASE ORAL at 07:45

## 2018-12-05 RX ADMIN — LEVOFLOXACIN 750 MG: 5 INJECTION, SOLUTION INTRAVENOUS at 04:44

## 2018-12-05 RX ADMIN — HYDRALAZINE HYDROCHLORIDE 5 MG: 20 INJECTION INTRAMUSCULAR; INTRAVENOUS at 07:59

## 2018-12-05 RX ADMIN — FENTANYL CITRATE 25 MCG: 50 INJECTION, SOLUTION INTRAMUSCULAR; INTRAVENOUS at 13:29

## 2018-12-05 RX ADMIN — FENTANYL CITRATE 25 MCG: 50 INJECTION, SOLUTION INTRAMUSCULAR; INTRAVENOUS at 09:10

## 2018-12-05 NOTE — PROGRESS NOTES
Bedside shift change report given to [facility] (oncoming nurse) by Ashly Roblero (offgoing nurse). Report included the following information SBAR, Kardex, MAR and Recent Results.

## 2018-12-05 NOTE — PROGRESS NOTES
Hospitalist Progress Note NAME: Tai Olivier. :  1960 MRN:  [id number] Assessment / Plan: 
Colitis with Hematochezia, Abdominal Pain and leukocytosis Infectious vs ischemic colitis, improving w abx so suspect more likely infectious colitis Tolerating cardiac diet today, likely switch to PO abx soon Appreciate GI consult, will need OP follow-up, likely colonoscopy once Tx completed Hematochezia POA, resolved Mild abd tenderness, pain much improved DC IV and encourage PO fluids H/H stable, follow am labs CT A/P 12/3 Proximal infectious versus inflammatory colitis. No abscess. 
  
HTN (hypertension) States he was taken off meds in the past 
Will place PRN nitropaste Elevated BP could be due to pain and stress Monitor, add meds PRN 
  
Smoker Counseled to quit Pt deferred nicotine patch at this time 
  
Code Status: Full Code Surrogate Decision Maker: Wife 
  
DVT Prophylaxis: SCDs GI Prophylaxis: not indicated 
  
Baseline: functional  
 
Subjective: Chief Complaint / Reason for Physician Visit Advanced diet to reg cardiac diet and tolerating well. Mild abd tenderness diffusely, no other acute complaints Review of Systems: 
Symptom Y/N Comments  Symptom Y/N Comments Fever/Chills n   Chest Pain n   
Poor Appetite n   Edema n   
Cough n   Abdominal Pain y Sputum n   Joint Pain SOB/YANG n   Pruritis/Rash Nausea/vomit n   Tolerating PT/OT Diarrhea y intermittenti  Tolerating Diet n   
Constipation y intermittent  Other Could NOT obtain due to:   
 
Objective: VITALS:  
Last 24hrs VS reviewed since prior progress note. Most recent are: 
Patient Vitals for the past 24 hrs: 
 Temp Pulse Resp BP SpO2  
18 1541 98.2 °F (36.8 °C) 78 16 118/82 93 % 18 0911    (!) 155/94   
18 0731 97.9 °F (36.6 °C) 61 16 (!) 158/103 94 % 18 2333 97.5 °F (36.4 °C) 60 16 148/80 95 % 18 2022 98.2 °F (36.8 °C) (!) 55 16 151/80 93 % Intake/Output Summary (Last 24 hours) at 12/5/2018 6590 Last data filed at 12/5/2018 1551 Gross per 24 hour Intake 2265 ml Output  Net 2265 ml PHYSICAL EXAM: 
General: WD, WN. Alert, cooperative, no acute distress   
EENT:  EOMI. Anicteric sclerae. MMM Resp:  CTA bilaterally, no wheezing or rales. No accessory muscle use CV:  Regular  rhythm,  No edema GI:  Firm, non-distended, +mild tenderness, +Bowel sounds Neurologic:  Alert and oriented X 3, normal speech, Psych:   Good insight. Not anxious nor agitated Skin:  No rashes. No jaundice Reviewed most current lab test results and cultures  YES Reviewed most current radiology test results   YES Review and summation of old records today    NO Reviewed patient's current orders and MAR    YES 
PMH/ reviewed - no change compared to H&P 
________________________________________________________________________ Care Plan discussed with: 
  Comments Patient x Family RN x Care Manager Consultant Multidiciplinary team rounds were held today with , nursing, pharmacist and clinical coordinator. Patient's plan of care was discussed; medications were reviewed and discharge planning was addressed. ________________________________________________________________________ Total NON critical care TIME: 25   Minutes Total CRITICAL CARE TIME Spent:   Minutes non procedure based Comments >50% of visit spent in counseling and coordination of care x   
________________________________________________________________________ Hector Courts, DO  
 
Procedures: see electronic medical records for all procedures/Xrays and details which were not copied into this note but were reviewed prior to creation of Plan. LABS: 
I reviewed today's most current labs and imaging studies. Pertinent labs include: 
Recent Labs 12/04/18 
2989 12/03/18 
1403 12/03/18 
9036 12/02/18 
4709 WBC 10.0  --   --  13.7* HGB 16.5 15.9 16.5 16.6 HCT 49.7 47.8 49.4 50.0   --   --  374 Recent Labs 12/04/18 
7381 12/03/18 
0347 12/02/18 
2123   --  140  
K 3.9  --  4.1 *  --  106 CO2 25  --  28  
*  --  89 BUN 8  --  15  
CREA 0.92  --  1.02  
CA 9.0  --  8.8 MG 2.3  --   --   
PHOS 3.4  --   --   
ALB 3.5  --  3.7 TBILI 0.7  --  0.5 SGOT 18 --  18 ALT 27  --  31 INR  --  1.0  --   
 
 
Signed: Kavita Florentino, DO

## 2018-12-05 NOTE — PROGRESS NOTES
Oncology Nursing Communication Tool 7:36 AM 
12/5/2018 Bedside shift change report given to Tristan Hanson (incoming nurse) by Therese Bush (outgoing nurse) on [facility]. . Report included the following information SBAR, Kardex, Intake/Output, MAR and Recent Results. Shift Summary: iv changed this am. Fentanyl given x3. Iv abx given. Issues for physician to address: pain not managed with fentanyl, pt says pain subsides for 1 hour then comes back Oncology Shift Note Admission Date 12/3/2018 Admission Diagnosis Colitis [K52.9] Code Status Full Code Consults IP CONSULT TO HOSPITALIST 
IP CONSULT TO GASTROENTEROLOGY Cardiac Monitoring [] Yes [x] No  
  
Purposeful Hourly Rounding [x] Yes   
Karlee Score Total Score: 1 Karlee score 3 or > [] Bed Alarm [] Avasys [] 1:1 sitter [] Patient refused (Place signed refusal form in chart) Pain Managed [] Yes [] No  
 Key Pain Meds The patient is on no pain meds. Influenza Vaccine Received Flu Vaccine for Current Season (usually Sept-March): Yes Oxygen needs? [x] Room air Oxygen @  []1L    []2L    []3L   []4L    []5L   []6L Use home O2? [] Yes [] No 
Perform O2 challenge test using  smartphrase (.oxygenchallenge) Last bowel movement Last Bowel Movement Date: 12/03/18 
bowel movement Urinary Catheter LDAs Peripheral IV 12/05/18 Anterior; Inferior; Lower;Right Arm (Active) Site Assessment Clean, dry, & intact 12/5/2018  7:11 AM  
Phlebitis Assessment 0 12/5/2018  7:11 AM  
Infiltration Assessment 0 12/5/2018  7:11 AM  
Dressing Status Clean, dry, & intact 12/5/2018  7:11 AM  
Dressing Type Tape;Transparent 12/5/2018  7:11 AM  
Hub Color/Line Status Blue;Flushed;Patent 12/5/2018  7:11 AM  
                  
  
Readmission Risk Assessment Tool Score Low Risk   
      
 9 Total Score   
  
 9 Pt. Coverage (Medicare=5 , Medicaid, or Self-Pay=4) Criteria that do not apply:  
 Has Seen PCP in Last 6 Months (Yes=3, No=0) . Living with Significant Other. Assisted Living. LTAC. SNF. or  
Rehab Patient Length of Stay (>5 days = 3) IP Visits Last 12 Months (1-3=4, 4=9, >4=11) Charlson Comorbidity Score (Age + Comorbid Conditions) Expected Length of Stay 2d 14h Actual Length of Stay 2 Evangelina Shown

## 2018-12-05 NOTE — PROGRESS NOTES
Reason for Admission:   ABD pain and rectal bleeding RRAT Score:          9 Plan for utilizing home health:    No current needs for home health - patient functioning independently prior to admission including driving Likelihood of Readmission:  Low to moderate - no current PCP Transition of Care Plan:                  CM in to see patient to discuss needs - introduced self and role of CM and patient confirmed all demographic information and home situation. Patient reports he lives in [facility] [place] [facility] in a private one story home w/ his wife. He states he had a primary care doctor approximately 1 1/2 years ago but stopped going when he had to take care of his sick father. Patient reports he has ongoing back pain and is requesting a [facility] PCP for hospital follow-up and assessment of back pain. Message sent to [facility] Specialist for new patient appointment in [facility] ED per patient request. 
 
Patient use [facility] pharmacy in [address] [place] [address] location for all medications and denies any difficulties with obtaining medications. Care Management Interventions PCP Verified by CM: No(Patient denies having current PCP - requesting [facility] MD in [place]) Transition of Care Consult (CM Consult): Other(Initial CM Assessment) MyChart Signup: No 
Discharge Durable Medical Equipment: No(No current DME in the home) Physical Therapy Consult: No 
Occupational Therapy Consult: No 
Speech Therapy Consult: No 
Current Support Network: Lives with Spouse, Own Home(One story private home) Confirm Follow Up Transport: Family(Patient drives himself - wife will transport home from hospital) Plan discussed with Pt/Family/Caregiver: Yes(Pt aler and oriented x 4 ) Discharge Location Discharge Placement: (Anticipate home with wife) Samara Sanford, RN, BSN, ACM  - Medical Oncology [phone]

## 2018-12-05 NOTE — PROGRESS NOTES
GI Progress Note [facility]) [address] [facility] [facility]  CQS:2/98/0340 ASHLEY:[id number] ATTG: DO Feliciano  PCP: None 
Date/Time:  12/5/2018 4848 Assessment: · Abd pain- c/w colitis involving Tc, but cx negative to date · Hematochezia- resolved · Diarrhea- resolved · Abnl CT 
· Heme positive stool Plan:  
· Continue IV abx · Will advance to cardiac diet · Would offer colonoscopy as OP about 2-4wks after completing abx Subjective:  
Discussed with RN events overnight. No further hematochezia, diarrhea, or BMs. Hungry. No increased pain with Full Liquid diet Complaint Y/N Description Abdominal Pain y Hematemesis n Hematochezia n Melena n   
Constipation n   
Diarrhea n Dyspepsia n Dysphagia n   
Jaundiced n   
Nausea/vomiting n Review of Systems: 
Symptom Y/N Comments  Symptom Y/N Comments Fever/Chills n   Chest Pain n   
Cough n   Headaches n Sputum n   Joint Pain n   
SOB/YANG n   Pruritis/Rash n Tolerating Diet y FLD  Other Could NOT obtain due to:   
 
Objective: VITALS:  
Last 24hrs VS reviewed since prior progress note. Most recent are: 
Visit Vitals BP (!) 158/103 (BP 1 Location: Left arm, BP Patient Position: Sitting) Pulse 61 Temp 97.9 °F (36.6 °C) Resp 16 Ht 5' 11\" (1.803 m) Wt 96.6 kg (212 lb 15.4 oz) SpO2 94% BMI 29.70 kg/m² Intake/Output Summary (Last 24 hours) at 12/5/2018 2893 Last data filed at 12/4/2018 1539 Gross per 24 hour Intake 588.75 ml Output  Net 588.75 ml PHYSICAL EXAM: 
General: WD, WN. Alert, cooperative, no acute distress   
HEENT: NC, Atraumatic. PERRL. Anicteric sclerae. Lungs:  CTA Bilaterally. No Wheezing/Rhonchi/Rales. Heart:  Regular  rhythm,  No murmur/Rub/Gallops Abdomen: Soft, +mildly distended, +mild upper Tenderness.  +BS Extremities: No c/c/e Neurologic:  CN 2-12 gi, A/O X 3. No acute neurological distress Psych:   Good insight. Not anxious nor agitated. Lab and Radiology Data Reviewed: (see below) C.dif negative Stool Cx negative Fecal WBC normal 
Hemoccult positive Medications Reviewed: (see below) PMH/SH reviewed - no change compared to H&P 
________________________________________________________________________ Total time spent with patient: 15 minutes ________________________________________________________________________ Care Plan discussed with: 
Patient y Family RN Consultant:    
 
Rhoda Coffey MD  
 
Procedures: see electronic medical records for all procedures/Xrays and details which were not copied into this note but were reviewed prior to creation of Plan. LABS: 
Recent Labs 12/04/18 
3222 12/03/18 
1403  12/02/18 2123 WBC 10.0  --   --  13.7* HGB 16.5 15.9   < > 16.6 HCT 49.7 47.8   < > 50.0   --   --  374  
 < > = values in this interval not displayed. Recent Labs 12/04/18 
0317 12/02/18 
2123  140  
K 3.9 4.1 * 106 CO2 25 28 BUN 8 15 CREA 0.92 1.02  
* 89  
CA 9.0 8.8 MG 2.3  --   
PHOS 3.4  --   
 
Recent Labs 12/04/18 
0317 12/02/18 
2123 SGOT 18 18 AP 67 67  
TP 7.2 7.5 ALB 3.5 3.7 GLOB 3.7 3.8 Recent Labs 12/03/18 
1561 INR 1.0 PTP 10.2 APTT 39.4* No results for input(s): FE, TIBC, PSAT, FERR in the last 72 hours. No results found for: FOL, RBCF No results for input(s): PH, PCO2, PO2 in the last 72 hours. No results for input(s): CPK, CKMB in the last 72 hours. No lab exists for component: TROPONINI No results found for: COLOR, APPRN, SPGRU, REFSG, JALEN, PROTU, GLUCU, KETU, BILU, UROU, DG, LEUKU, GLUKE, EPSU, BACTU, WBCU, RBCU, CASTS, UCRY MEDICATIONS: 
Current Facility-Administered Medications Medication Dose Route Frequency  levoFLOXacin (LEVAQUIN) 750 mg in D5W IVPB  750 mg IntraVENous Q24H  pantoprazole (PROTONIX) granules for oral suspension 40 mg  40 mg Oral ACB  0.9% sodium chloride infusion  75 mL/hr IntraVENous CONTINUOUS  
 sodium chloride (NS) flush 5-10 mL  5-10 mL IntraVENous Q8H  
 sodium chloride (NS) flush 5-10 mL  5-10 mL IntraVENous PRN  
 acetaminophen (TYLENOL) tablet 650 mg  650 mg Oral Q6H PRN  
 ondansetron (ZOFRAN) injection 4 mg  4 mg IntraVENous Q4H PRN  
 fentaNYL citrate (PF) injection 25 mcg  25 mcg IntraVENous Q4H PRN  
 hydrALAZINE (APRESOLINE) 20 mg/mL injection 5 mg  5 mg IntraVENous Q6H PRN  
 metroNIDAZOLE (FLAGYL) IVPB premix 500 mg  500 mg IntraVENous Q12H

## 2018-12-05 NOTE — PROGRESS NOTES
New Patient First Initial PCP [facility] appt scheduled with Dr. Conor Motta on 12/11/2018 at 10:10am Appt added to AVS. Mihai Thompson CM Specialist

## 2018-12-06 VITALS
SYSTOLIC BLOOD PRESSURE: 160 MMHG | WEIGHT: 212.96 LBS | BODY MASS INDEX: 29.81 KG/M2 | TEMPERATURE: 98.4 F | HEART RATE: 59 BPM | OXYGEN SATURATION: 93 % | RESPIRATION RATE: 16 BRPM | HEIGHT: 71 IN | DIASTOLIC BLOOD PRESSURE: 102 MMHG

## 2018-12-06 LAB
ANION GAP SERPL CALC-SCNC: 9 MMOL/L (ref 5–15)
BASOPHILS # BLD: 0.1 K/UL (ref 0–0.1)
BASOPHILS NFR BLD: 1 % (ref 0–1)
BUN SERPL-MCNC: 10 MG/DL (ref 6–20)
BUN/CREAT SERPL: 9 (ref 12–20)
CALCIUM SERPL-MCNC: 8.5 MG/DL (ref 8.5–10.1)
CHLORIDE SERPL-SCNC: 109 MMOL/L (ref 97–108)
CO2 SERPL-SCNC: 22 MMOL/L (ref 21–32)
COMMENT, HOLDF: NORMAL
CREAT SERPL-MCNC: 1.15 MG/DL (ref 0.7–1.3)
DIFFERENTIAL METHOD BLD: ABNORMAL
EOSINOPHIL # BLD: 0.6 K/UL (ref 0–0.4)
EOSINOPHIL NFR BLD: 5 % (ref 0–7)
ERYTHROCYTE [DISTWIDTH] IN BLOOD BY AUTOMATED COUNT: 13.6 % (ref 11.5–14.5)
GLUCOSE SERPL-MCNC: 137 MG/DL (ref 65–100)
HCT VFR BLD AUTO: 49.5 % (ref 36.6–50.3)
HGB BLD-MCNC: 16.5 G/DL (ref 12.1–17)
IMM GRANULOCYTES # BLD: 0 K/UL (ref 0–0.04)
IMM GRANULOCYTES NFR BLD AUTO: 0 % (ref 0–0.5)
LYMPHOCYTES # BLD: 2 K/UL (ref 0.8–3.5)
LYMPHOCYTES NFR BLD: 19 % (ref 12–49)
MCH RBC QN AUTO: 30.4 PG (ref 26–34)
MCHC RBC AUTO-ENTMCNC: 33.3 G/DL (ref 30–36.5)
MCV RBC AUTO: 91.2 FL (ref 80–99)
MONOCYTES # BLD: 0.7 K/UL (ref 0–1)
MONOCYTES NFR BLD: 7 % (ref 5–13)
NEUTS SEG # BLD: 7.3 K/UL (ref 1.8–8)
NEUTS SEG NFR BLD: 68 % (ref 32–75)
NRBC # BLD: 0 K/UL (ref 0–0.01)
NRBC BLD-RTO: 0 PER 100 WBC
PLATELET # BLD AUTO: 343 K/UL (ref 150–400)
PMV BLD AUTO: 10.9 FL (ref 8.9–12.9)
POTASSIUM SERPL-SCNC: 3.8 MMOL/L (ref 3.5–5.1)
RBC # BLD AUTO: 5.43 M/UL (ref 4.1–5.7)
SAMPLES BEING HELD,HOLD: NORMAL
SODIUM SERPL-SCNC: 140 MMOL/L (ref 136–145)
WBC # BLD AUTO: 10.7 K/UL (ref 4.1–11.1)

## 2018-12-06 PROCEDURE — 74011250637 HC RX REV CODE- 250/637: Performed by: INTERNAL MEDICINE

## 2018-12-06 PROCEDURE — 80048 BASIC METABOLIC PNL TOTAL CA: CPT

## 2018-12-06 PROCEDURE — 74011250636 HC RX REV CODE- 250/636: Performed by: EMERGENCY MEDICINE

## 2018-12-06 PROCEDURE — 85025 COMPLETE CBC W/AUTO DIFF WBC: CPT

## 2018-12-06 PROCEDURE — 74011250636 HC RX REV CODE- 250/636: Performed by: INTERNAL MEDICINE

## 2018-12-06 PROCEDURE — 36415 COLL VENOUS BLD VENIPUNCTURE: CPT

## 2018-12-06 RX ORDER — AMLODIPINE BESYLATE 5 MG/1
5 TABLET ORAL DAILY
Qty: 30 TAB | Refills: 3 | Status: SHIPPED | OUTPATIENT
Start: 2018-12-06 | End: 2019-02-22 | Stop reason: SDUPTHER

## 2018-12-06 RX ORDER — METRONIDAZOLE 500 MG/1
500 TABLET ORAL EVERY 12 HOURS
Qty: 16 TAB | Refills: 0 | Status: SHIPPED | OUTPATIENT
Start: 2018-12-06 | End: 2018-12-14

## 2018-12-06 RX ORDER — METRONIDAZOLE 250 MG/1
500 TABLET ORAL EVERY 12 HOURS
Status: DISCONTINUED | OUTPATIENT
Start: 2018-12-06 | End: 2018-12-06 | Stop reason: HOSPADM

## 2018-12-06 RX ORDER — LEVOFLOXACIN 750 MG/1
750 TABLET ORAL EVERY 24 HOURS
Status: DISCONTINUED | OUTPATIENT
Start: 2018-12-06 | End: 2018-12-06 | Stop reason: HOSPADM

## 2018-12-06 RX ORDER — LEVOFLOXACIN 750 MG/1
750 TABLET ORAL DAILY
Qty: 8 TAB | Refills: 0 | Status: SHIPPED | OUTPATIENT
Start: 2018-12-06 | End: 2018-12-14

## 2018-12-06 RX ADMIN — FENTANYL CITRATE 25 MCG: 50 INJECTION, SOLUTION INTRAMUSCULAR; INTRAVENOUS at 10:45

## 2018-12-06 RX ADMIN — Medication 10 ML: at 04:37

## 2018-12-06 RX ADMIN — FENTANYL CITRATE 25 MCG: 50 INJECTION, SOLUTION INTRAMUSCULAR; INTRAVENOUS at 02:49

## 2018-12-06 RX ADMIN — AMLODIPINE BESYLATE 5 MG: 5 TABLET ORAL at 09:23

## 2018-12-06 RX ADMIN — PANTOPRAZOLE SODIUM 40 MG: 40 GRANULE, DELAYED RELEASE ORAL at 09:23

## 2018-12-06 RX ADMIN — FENTANYL CITRATE 25 MCG: 50 INJECTION, SOLUTION INTRAMUSCULAR; INTRAVENOUS at 06:49

## 2018-12-06 RX ADMIN — LEVOFLOXACIN 750 MG: 5 INJECTION, SOLUTION INTRAVENOUS at 04:36

## 2018-12-06 RX ADMIN — METRONIDAZOLE 500 MG: 250 TABLET ORAL at 09:23

## 2018-12-06 NOTE — PROGRESS NOTES
Discharge instructions reviewed with the patient including follow up apts, medications, and signs/symptoms to report. Opportunity for questions and clarification was provided. Patient verbalized understanding. Patient's belongings sent with patient. Patient's wife to transport patient home.

## 2018-12-06 NOTE — PROGRESS NOTES
GI Progress Note [facility]) [address] LIMA:[birth year] FBQ:[id number] ATTG: DO Feliciano  PCP: None 
Date/Time:  12/6/2018 0700 Assessment: · Abd pain- c/w colitis involving TC, cx negative to date · Hematochezia- resolved · Diarrhea- resolved · Abnl CT 
· Heme positive stool Plan:  
· Continue abx for 10-12d total 
· Continue cardiac diet · Would offer colonoscopy as OP about 2-4wks after completing abx; my office will contact patient at cell [phone] or  [phone] to set up · Consider for d/c home today · Will sign off Subjective:  
Discussed with RN events overnight. No further hematochezia, diarrhea. Had a BM and feels better. No increased pain with cardiac diet Complaint Y/N Description Abdominal Pain y Hematemesis n Hematochezia n Melena n   
Constipation n   
Diarrhea n Dyspepsia n Dysphagia n   
Jaundiced n   
Nausea/vomiting n Review of Systems: 
Symptom Y/N Comments  Symptom Y/N Comments Fever/Chills n   Chest Pain n   
Cough n   Headaches n Sputum n   Joint Pain n   
SOB/YANG n   Pruritis/Rash n Tolerating Diet y cardiac  Other Could NOT obtain due to:   
 
Objective: VITALS:  
Last 24hrs VS reviewed since prior progress note. Most recent are: 
Visit Vitals BP (!) 166/94 (BP 1 Location: Left arm, BP Patient Position: Sitting) Pulse (!) 58 Temp 98.3 °F (36.8 °C) Resp 16 Ht 5' 11\" (1.803 m) Wt 96.6 kg (212 lb 15.4 oz) SpO2 94% BMI 29.70 kg/m² Intake/Output Summary (Last 24 hours) at 12/6/2018 5368 Last data filed at 12/5/2018 2301 Gross per 24 hour Intake 2902.5 ml Output  Net 2902.5 ml PHYSICAL EXAM: 
General: WD, WN. Alert, cooperative, no acute distress   
HEENT: NC, Atraumatic. PERRL. Anicteric sclerae. Lungs:  CTA Bilaterally. No Wheezing/Rhonchi/Rales. Heart:  Regular  rhythm,  No murmur/Rub/Gallops Abdomen: S, +less distended, +minimal upper Tenderness.  +BS, ML scar c/w hernia repair Extremities: No c/c/e Neurologic:  CN 2-12 gi, A/O X 3. No acute neurological distress Psych:   Good insight. Not anxious nor agitated. Lab and Radiology Data Reviewed: (see below) C.dif negative Stool Cx negative Fecal WBC normal 
Hemoccult positive Medications Reviewed: (see below) PMH/SH reviewed - no change compared to H&P 
________________________________________________________________________ Total time spent with patient: 15 minutes ________________________________________________________________________ Care Plan discussed with: 
Patient y Family RN y  
           Consultant:    
 
Leopoldo Points, MD  
 
Procedures: see electronic medical records for all procedures/Xrays and details which were not copied into this note but were reviewed prior to creation of Plan. LABS: 
Recent Labs 12/06/18 
0424 12/04/18 
2220 WBC 10.7 10.0 HGB 16.5 16.5 HCT 49.5 49.7  348 Recent Labs 12/06/18 
0424 12/04/18 
7379  141  
K 3.8 3.9 * 109* CO2 22 25 BUN 10 8 CREA 1.15 0.92  
* 106* CA 8.5 9.0 MG  --  2.3 PHOS  --  3.4 Recent Labs 12/04/18 
5693 SGOT 18 AP 67  
TP 7.2 ALB 3.5 GLOB 3.7 No results for input(s): INR, PTP, APTT in the last 72 hours. No lab exists for component: INREXT, INREXT No results for input(s): FE, TIBC, PSAT, FERR in the last 72 hours. No results found for: FOL, RBCF No results for input(s): PH, PCO2, PO2 in the last 72 hours. No results for input(s): CPK, CKMB in the last 72 hours. No lab exists for component: TROPONINI No results found for: COLOR, APPRN, SPGRU, REFSG, JALEN, PROTU, GLUCU, KETU, BILU, UROU, DG, LEUKU, GLUKE, EPSU, BACTU, WBCU, RBCU, CASTS, UCRY MEDICATIONS: 
Current Facility-Administered Medications Medication Dose Route Frequency  levoFLOXacin (LEVAQUIN) tablet 750 mg  750 mg Oral Q24H  
 metroNIDAZOLE (FLAGYL) tablet 500 mg  500 mg Oral Q12H  amLODIPine (NORVASC) tablet 5 mg  5 mg Oral DAILY  pantoprazole (PROTONIX) granules for oral suspension 40 mg  40 mg Oral ACB  
 0.9% sodium chloride infusion  75 mL/hr IntraVENous CONTINUOUS  
 sodium chloride (NS) flush 5-10 mL  5-10 mL IntraVENous Q8H  
 sodium chloride (NS) flush 5-10 mL  5-10 mL IntraVENous PRN  
 acetaminophen (TYLENOL) tablet 650 mg  650 mg Oral Q6H PRN  
 ondansetron (ZOFRAN) injection 4 mg  4 mg IntraVENous Q4H PRN  
 fentaNYL citrate (PF) injection 25 mcg  25 mcg IntraVENous Q4H PRN  
 hydrALAZINE (APRESOLINE) 20 mg/mL injection 5 mg  5 mg IntraVENous Q6H PRN

## 2018-12-06 NOTE — PROGRESS NOTES
Oncology Nursing Communication Tool 7:46 PM 
12/5/2018 Bedside shift change report given to MAYELA Hood (incoming nurse) by Fer Elmore (outgoing nurse) on [facility] . Report included the following information SBAR, Kardex, MAR and Recent Results. Shift Summary: Solids foods tolerated well; no complaints of N/V; no diarhhea; BM x1; hard formed per patient; PRN pain med x3 Issues for physician to address: D/C Oncology Shift Note Admission Date 12/3/2018 Admission Diagnosis Colitis [K52.9] Code Status Full Code Consults IP CONSULT TO HOSPITALIST 
IP CONSULT TO GASTROENTEROLOGY Cardiac Monitoring [] Yes [] No  
  
Purposeful Hourly Rounding [] Yes   
Karlee Score Total Score: 1 Karlee score 3 or > [] Bed Alarm [] Avasys [] 1:1 sitter [] Patient refused (Place signed refusal form in chart) Pain Managed [] Yes [] No  
 Key Pain Meds The patient is on no pain meds. Influenza Vaccine Received Flu Vaccine for Current Season (usually Sept-March): Yes Oxygen needs? [] Room air Oxygen @  []1L    []2L    []3L   []4L    []5L   []6L Use home O2? [] Yes [] No 
Perform O2 challenge test using  smartphrase (.oxygenchallenge) Last bowel movement Last Bowel Movement Date: 12/05/18 
bowel movement Urinary Catheter LDAs Peripheral IV 12/05/18 Anterior; Inferior; Lower;Right Arm (Active) Site Assessment Clean, dry, & intact 12/5/2018  3:51 PM  
Phlebitis Assessment 0 12/5/2018  3:51 PM  
Infiltration Assessment 0 12/5/2018  3:51 PM  
Dressing Status Clean, dry, & intact 12/5/2018  3:51 PM  
Dressing Type Tape;Transparent 12/5/2018  3:51 PM  
Hub Color/Line Status Blue; Infusing 12/5/2018  3:51 PM  
                  
  
Readmission Risk Assessment Tool Score Low Risk   
      
 9 Total Score   
  
 9 Pt. Coverage (Medicare=5 , Medicaid, or Self-Pay=4) Criteria that do not apply: Has Seen PCP in Last 6 Months (Yes=3, No=0) . Living with Significant Other. Assisted Living. LTAC. SNF. or  
Rehab Patient Length of Stay (>5 days = 3) IP Visits Last 12 Months (1-3=4, 4=9, >4=11) Charlson Comorbidity Score (Age + Comorbid Conditions) Expected Length of Stay 2d 14h Actual Length of Stay 2 Isabella Mensah

## 2018-12-06 NOTE — PROGRESS NOTES
Bedside shift change report given to me (oncoming nurse) by Emelyn Mo RN(offgoing nurse). Report included the following information SBAR, Kardex, Intake/Output, MAR and Recent Results.

## 2018-12-06 NOTE — CDMP QUERY
The diagnosis of ischemic vs infectious colitis has been documented for this patient. The following is also noted in the medical record: * Presented with LUQ abd pain x 3 weeks, and BRBPR 
* CT A/P shows Proximal infectious versus inflammatory colitis. No abscess. * Stool C. Diff negative, stool WBC 0-4, stool culture negative * Current documentation \"Infectious vs ischemic colitis, improving w abx\" Based on your medical judgment, could you please documented in the medical record, which diagnosis, after further study, is responsible for occasioning this admission? 
 
=> Ischemic colitis 
=> Infectious colitis 
=> Clinically Undetermined (no explanation for clinical findings) Please clarify and document your clinical opinion in the progress notes and discharge summary including the definitive and/or presumptive diagnosis, (suspected or probable), related to the above clinical findings. Please include clinical findings supporting your diagnosis.  
 
Sharmaine Zhang, [address] [place] [address], [facility] E [email or web address]

## 2018-12-11 ENCOUNTER — OFFICE VISIT (OUTPATIENT)
Dept: FAMILY MEDICINE CLINIC | Age: 58
End: 2018-12-11

## 2018-12-11 VITALS
OXYGEN SATURATION: 95 % | SYSTOLIC BLOOD PRESSURE: 112 MMHG | RESPIRATION RATE: 18 BRPM | WEIGHT: 208.2 LBS | DIASTOLIC BLOOD PRESSURE: 81 MMHG | BODY MASS INDEX: 29.15 KG/M2 | TEMPERATURE: 97.5 F | HEIGHT: 71 IN | HEART RATE: 95 BPM

## 2018-12-11 DIAGNOSIS — K52.9 COLITIS, ACUTE: ICD-10-CM

## 2018-12-11 DIAGNOSIS — Z90.49 HISTORY OF CHOLECYSTECTOMY: ICD-10-CM

## 2018-12-11 DIAGNOSIS — I10 ESSENTIAL HYPERTENSION: ICD-10-CM

## 2018-12-11 DIAGNOSIS — Z98.890 H/O HERNIA REPAIR: ICD-10-CM

## 2018-12-11 DIAGNOSIS — Z09 HOSPITAL DISCHARGE FOLLOW-UP: ICD-10-CM

## 2018-12-11 DIAGNOSIS — F17.200 SMOKER: ICD-10-CM

## 2018-12-11 DIAGNOSIS — M47.26 OSTEOARTHRITIS OF SPINE WITH RADICULOPATHY, LUMBAR REGION: ICD-10-CM

## 2018-12-11 DIAGNOSIS — Z76.89 ESTABLISHING CARE WITH NEW DOCTOR, ENCOUNTER FOR: Primary | ICD-10-CM

## 2018-12-11 DIAGNOSIS — Z87.19 H/O HERNIA REPAIR: ICD-10-CM

## 2018-12-11 PROBLEM — D72.829 LEUKOCYTOSIS: Status: RESOLVED | Noted: 2018-12-03 | Resolved: 2018-12-11

## 2018-12-11 NOTE — PROGRESS NOTES
Chief Complaint Patient presents with  New Patient [facility] Follow Up [address] Follow up.

## 2018-12-11 NOTE — PROGRESS NOTES
Gerardo Milton is a 60 y.o. male, who's a new patient to our practice. Previous PCP: he can't remember name  Last seen there: 1.5 years ago     has a past medical history of Chronic pain, Colitis, Degenerative disc disease, lumbar, Essential hypertension, H/O hernia repair, History of cholecystectomy, Osteoarthritis of spine with radiculopathy, lumbar region, and Smoker. Hospital discharge follow up  12/03 to 12/06    Colitis with Hematochezia  GI consult   Still taking Levaquin and Flagyl 3 days left. Since been home, denies fever, chills. Hematochezia. Currently PO lightly, BM normal past 2 days. Colonoscopy planned out patient next Tuesday with Dr. Sonya Campbell,     HTN: amlodipine, BP at goal today. Smoker 1ppd X 40 years    Lumbar DJD hx      Reviewed: active problem list, medication list, allergies, family history, social history, notes from last encounter, lab results    A comprehensive review of systems was negative except for that written in the HPI, on 14 ROS. Allergies   Allergen Reactions    Aspirin Other (comments)     Nose bleeds    Morphine Other (comments)     Agitation     Current Outpatient Medications on File Prior to Visit   Medication Sig Dispense Refill    amLODIPine (NORVASC) 5 mg tablet Take 1 Tab by mouth daily. 30 Tab 3    levoFLOXacin (LEVAQUIN) 750 mg tablet Take 1 Tab by mouth daily for 8 days. 8 Tab 0    metroNIDAZOLE (FLAGYL) 500 mg tablet Take 1 Tab by mouth every twelve (12) hours for 8 days. 16 Tab 0     No current facility-administered medications on file prior to visit.       Patient Active Problem List   Diagnosis Code    Colitis K52.9    Essential hypertension I10    Osteoarthritis of spine with radiculopathy, lumbar region M47.26    Smoker F17.200    History of cholecystectomy Z90.49    H/O hernia repair Z98.890, Z87.19       Visit Vitals  /81 (BP 1 Location: Left arm, BP Patient Position: Sitting)   Pulse 95   Temp 97.5 °F (36.4 °C) (Oral)   Resp 18   Ht 5' 11\" (1.803 m)   Wt 208 lb 3.2 oz (94.4 kg)   SpO2 95%   BMI 29.04 kg/m²     General appearance: alert, cooperative, no distress, appears stated age  Neurologic: Alert and oriented X 3, normal strength and tone, symmetric. Normal without focal findings. Cranial nerves 2-12 intact. Normal coordination and gait. Mental status: Alert, oriented, thought content appropriate, affect: stable, mood-congruent. Head: Normocephalic, without obvious abnormality, atraumatic  Eyes: conjunctivae/corneas clear. PERRL, EOM's intact. Neck: supple, symmetrical, trachea midline, no JVD  Lungs: clear to auscultation bilaterally  Heart: regular rate and rhythm, S1, S2 normal, no murmur, click, rub or gallop  Abdomen: soft, non-tender. Mild pain on deep palpation, exam limited due to body habitus. Extremities normal, atraumatic, no cyanosis or edema      Assessment/Plans:    Diagnoses and all orders for this visit:    1. Establishing care with new doctor, encounter for    2. Hospital discharge follow-up    3. Essential hypertension    4. Colitis, acute    5. Smoker    6. Osteoarthritis of spine with radiculopathy, lumbar region    7. H/O hernia repair    8. History of cholecystectomy      Discussed plans, risk/benefits of treatments/observations. Through the use of shared decision making, above plans were agreed upon. Medication compliance advised. Patient verbalized understanding. Follow-up Disposition:  Return in about 8 weeks (around 2/5/2019) for CPE.       Sangeeta Bang MD  12/11/2018

## 2018-12-18 ENCOUNTER — HOSPITAL ENCOUNTER (OUTPATIENT)
Age: 58
Setting detail: OUTPATIENT SURGERY
Discharge: HOME | End: 2018-12-18
Attending: INTERNAL MEDICINE | Admitting: INTERNAL MEDICINE
Payer: MEDICARE

## 2018-12-18 ENCOUNTER — ANESTHESIA (OUTPATIENT)
Dept: ENDOSCOPY | Age: 58
End: 2018-12-18
Payer: MEDICARE

## 2018-12-18 ENCOUNTER — ANESTHESIA EVENT (OUTPATIENT)
Dept: ENDOSCOPY | Age: 58
End: 2018-12-18
Payer: MEDICARE

## 2018-12-18 VITALS
HEIGHT: 71 IN | OXYGEN SATURATION: 93 % | HEART RATE: 63 BPM | DIASTOLIC BLOOD PRESSURE: 72 MMHG | SYSTOLIC BLOOD PRESSURE: 119 MMHG | RESPIRATION RATE: 17 BRPM | TEMPERATURE: 97.7 F | WEIGHT: 208.06 LBS | BODY MASS INDEX: 29.13 KG/M2

## 2018-12-18 PROCEDURE — 77030013992 HC SNR POLYP ENDOSC BSC -B: Performed by: INTERNAL MEDICINE

## 2018-12-18 PROCEDURE — 88305 TISSUE EXAM BY PATHOLOGIST: CPT

## 2018-12-18 PROCEDURE — 76040000019: Performed by: INTERNAL MEDICINE

## 2018-12-18 PROCEDURE — 76060000031 HC ANESTHESIA FIRST 0.5 HR: Performed by: INTERNAL MEDICINE

## 2018-12-18 PROCEDURE — 74011250636 HC RX REV CODE- 250/636: Performed by: INTERNAL MEDICINE

## 2018-12-18 PROCEDURE — 74011250636 HC RX REV CODE- 250/636

## 2018-12-18 RX ORDER — FENTANYL CITRATE 50 UG/ML
50 INJECTION, SOLUTION INTRAMUSCULAR; INTRAVENOUS
Status: DISCONTINUED | OUTPATIENT
Start: 2018-12-18 | End: 2018-12-18 | Stop reason: HOSPADM

## 2018-12-18 RX ORDER — FENTANYL CITRATE 50 UG/ML
25 INJECTION, SOLUTION INTRAMUSCULAR; INTRAVENOUS
Status: DISCONTINUED | OUTPATIENT
Start: 2018-12-18 | End: 2018-12-18 | Stop reason: HOSPADM

## 2018-12-18 RX ORDER — SODIUM CHLORIDE 0.9 % (FLUSH) 0.9 %
5-10 SYRINGE (ML) INJECTION AS NEEDED
Status: DISCONTINUED | OUTPATIENT
Start: 2018-12-18 | End: 2018-12-18 | Stop reason: HOSPADM

## 2018-12-18 RX ORDER — MIDAZOLAM HYDROCHLORIDE 1 MG/ML
.25-5 INJECTION, SOLUTION INTRAMUSCULAR; INTRAVENOUS
Status: DISCONTINUED | OUTPATIENT
Start: 2018-12-18 | End: 2018-12-18 | Stop reason: HOSPADM

## 2018-12-18 RX ORDER — DEXTROMETHORPHAN/PSEUDOEPHED 2.5-7.5/.8
1.2 DROPS ORAL
Status: DISCONTINUED | OUTPATIENT
Start: 2018-12-18 | End: 2018-12-18 | Stop reason: HOSPADM

## 2018-12-18 RX ORDER — EPINEPHRINE 0.1 MG/ML
1 INJECTION INTRACARDIAC; INTRAVENOUS
Status: DISCONTINUED | OUTPATIENT
Start: 2018-12-18 | End: 2018-12-18 | Stop reason: HOSPADM

## 2018-12-18 RX ORDER — FLUMAZENIL 0.1 MG/ML
0.2 INJECTION INTRAVENOUS
Status: DISCONTINUED | OUTPATIENT
Start: 2018-12-18 | End: 2018-12-18 | Stop reason: HOSPADM

## 2018-12-18 RX ORDER — NALOXONE HYDROCHLORIDE 0.4 MG/ML
0.4 INJECTION, SOLUTION INTRAMUSCULAR; INTRAVENOUS; SUBCUTANEOUS
Status: DISCONTINUED | OUTPATIENT
Start: 2018-12-18 | End: 2018-12-18 | Stop reason: HOSPADM

## 2018-12-18 RX ORDER — ATROPINE SULFATE 0.1 MG/ML
0.5 INJECTION INTRAVENOUS
Status: DISCONTINUED | OUTPATIENT
Start: 2018-12-18 | End: 2018-12-18 | Stop reason: HOSPADM

## 2018-12-18 RX ORDER — SODIUM CHLORIDE 0.9 % (FLUSH) 0.9 %
5-10 SYRINGE (ML) INJECTION EVERY 8 HOURS
Status: DISCONTINUED | OUTPATIENT
Start: 2018-12-18 | End: 2018-12-18 | Stop reason: HOSPADM

## 2018-12-18 RX ORDER — PROPOFOL 10 MG/ML
INJECTION, EMULSION INTRAVENOUS AS NEEDED
Status: DISCONTINUED | OUTPATIENT
Start: 2018-12-18 | End: 2018-12-18 | Stop reason: HOSPADM

## 2018-12-18 RX ORDER — SODIUM CHLORIDE 9 MG/ML
75 INJECTION, SOLUTION INTRAVENOUS CONTINUOUS
Status: DISCONTINUED | OUTPATIENT
Start: 2018-12-18 | End: 2018-12-18 | Stop reason: HOSPADM

## 2018-12-18 RX ORDER — LIDOCAINE HYDROCHLORIDE 20 MG/ML
INJECTION, SOLUTION EPIDURAL; INFILTRATION; INTRACAUDAL; PERINEURAL AS NEEDED
Status: DISCONTINUED | OUTPATIENT
Start: 2018-12-18 | End: 2018-12-18 | Stop reason: HOSPADM

## 2018-12-18 RX ADMIN — PROPOFOL 40 MG: 10 INJECTION, EMULSION INTRAVENOUS at 10:08

## 2018-12-18 RX ADMIN — PROPOFOL 30 MG: 10 INJECTION, EMULSION INTRAVENOUS at 10:14

## 2018-12-18 RX ADMIN — PROPOFOL 40 MG: 10 INJECTION, EMULSION INTRAVENOUS at 10:06

## 2018-12-18 RX ADMIN — SODIUM CHLORIDE 75 ML/HR: 900 INJECTION, SOLUTION INTRAVENOUS at 09:40

## 2018-12-18 RX ADMIN — PROPOFOL 100 MG: 10 INJECTION, EMULSION INTRAVENOUS at 10:04

## 2018-12-18 RX ADMIN — PROPOFOL 20 MG: 10 INJECTION, EMULSION INTRAVENOUS at 10:11

## 2018-12-18 RX ADMIN — LIDOCAINE HYDROCHLORIDE 30 MG: 20 INJECTION, SOLUTION EPIDURAL; INFILTRATION; INTRACAUDAL; PERINEURAL at 10:03

## 2018-12-18 NOTE — PROCEDURES
NAME:  Efra Morrissey   :   1960   MRN:   [id number]     Date/Time:  2018 10:26 AM    Colonoscopy Operative Report    Procedure Type:   Colonoscopy with polypectomy (cold snare)     Indications:     abnormal GI CT of colon  Pre-operative Diagnosis: see indication above  Post-operative Diagnosis:  See findings below  :  Titus Washington MD  Referring Provider: Polina Casper MD    Exam:  Airway: clear, no airway problems anticipated  Heart: RRR, without gallops or rubs  Lungs: clear bilaterally without wheezes, crackles, or rhonchi  Abdomen: soft, nontender, nondistended, bowel sounds present  Mental Status: awake, alert and oriented to person, place and time    Sedation:  MAC anesthesia Propofol 230mg IV  Procedure Details:  After informed consent was obtained with all risks and benefits of procedure explained and preoperative exam completed, the patient was taken to the endoscopy suite and placed in the left lateral decubitus position. Upon sequential sedation as per above, a digital rectal exam was performed demonstrating internal hemorrhoids. The Olympus videocolonoscope  was inserted in the rectum and carefully advanced to the cecum, which was identified by the ileocecal valve and appendiceal orifice. The distal terminal ileum was evaluated. The quality of preparation was adequate. The colonoscope was slowly withdrawn with careful evaluation between folds. Retroflexion in the rectum was completed demonstrating internal hemorrhoids. Findings:     -Normal terminal ileum  -Single benign-appearing 4mm sessile cecal polyp, removed with cold snare  -Single benign-appearing 2mm sessile sigmoid polyp at 40cm, removed with cold snare  -Sigmoid diverticulosis  -Small grade 1 internal hemorrhoids with skin tags  -No evidence of colitis    Specimen Removed:  #1 cecal polyp; #2 sigmoid polyp  Complications: None. EBL:  None.     Impression:    -Normal terminal ileum  -Single benign-appearing 4mm sessile cecal polyp, removed with cold snare  -Single benign-appearing 2mm sessile sigmoid polyp at 40cm, removed with cold snare  -Sigmoid diverticulosis  -Small grade 1 internal hemorrhoids with skin tags  -No evidence of colitis    Recommendations: --Await pathology. , -If adenoma is present, repeat colonoscopy in 5 years; otherwise would continue colon cancer screening in this average-risk patient as colonoscopy in 10 years. High fiber diet. Resume normal medication(s). You will receive a letter about the biopsy results in about 10 days. You may be asked to call your doctor's office for the results. Discharge Disposition:  Home in the company of a  when able to ambulate.     Lisbeth Cheatham MD

## 2018-12-18 NOTE — ANESTHESIA PREPROCEDURE EVALUATION
Anesthetic History   No history of anesthetic complications            Review of Systems / Medical History  Patient summary reviewed, nursing notes reviewed and pertinent labs reviewed    Pulmonary  Within defined limits                 Neuro/Psych   Within defined limits           Cardiovascular  Within defined limits  Hypertension              Exercise tolerance: >4 METS     GI/Hepatic/Renal  Within defined limits   GERD      Hiatal hernia     Endo/Other  Within defined limits      Obesity and arthritis     Other Findings              Physical Exam    Airway  Mallampati: II  TM Distance: 4 - 6 cm  Neck ROM: normal range of motion   Mouth opening: Normal     Cardiovascular  Regular rate and rhythm,  S1 and S2 normal,  no murmur, click, rub, or gallop             Dental  No notable dental hx       Pulmonary  Breath sounds clear to auscultation               Abdominal  GI exam deferred       Other Findings            Anesthetic Plan    ASA: 2  Anesthesia type: general and total IV anesthesia          Induction: Intravenous  Anesthetic plan and risks discussed with: Patient      Propofol MAC

## 2018-12-18 NOTE — ROUTINE PROCESS
Babak Mars  1960  [id number]    Situation:  Verbal report received from:  Chip Roth RN  Procedure: Procedure(s):  COLONOSCOPY  ENDOSCOPIC POLYPECTOMY    Background:    Preoperative diagnosis: ABNORMAL BARIUM SWALLOW, ABNORMAL FINIDINGS IN STOOL  Postoperative diagnosis: polyps X 2, diverticulosis,  hemorrhoids    :  Dr. Clint Kaye  Assistant(s): Endoscopy RN-1: Glendy Frank RN  Endoscopy RN-2: Magda Walker RN    Specimens:   ID Type Source Tests Collected by Time Destination   1 : cecum polyp Preservative Cecum  Josef Rider MD 12/18/2018 1011 Pathology   2 : sigmoid polyp Preservative Sigmoid  Josef Rider MD 12/18/2018 1015 Pathology     H. Pylori  no    Assessment:  Intra-procedure medications       Anesthesia gave intra-procedure sedation and medications, see anesthesia flow sheet yes    Intravenous fluids: NS@ KVO     Vital signs stable       Abdominal assessment: round and soft       Recommendation:  Discharge patient per MD order  .   Family or Friend  Simba cartagena  Permission to share finding with family or friend yes

## 2018-12-18 NOTE — PERIOP NOTES
Anesthesia reports 230mg Propofol, 30mg Lidocaine and 750mL NS given during procedure. Received report from anesthesia staff on vital signs and status of patient.

## 2018-12-18 NOTE — H&P
See prior Consultation Note and progress notes. The patient was reexamined. No interval change in the last 30 days. Proceed with procedure(s) with deep sedation or conscious sedation as clinically indicated.

## 2018-12-18 NOTE — DISCHARGE INSTRUCTIONS
Carrol Espino  [id number]  1960    COLON DISCHARGE INSTRUCTIONS  Discomfort:  Redness at IV site- apply warm compress to area; if redness or soreness persist- contact your physician  There may be a slight amount of blood passed from the rectum  Gaseous discomfort- walking, belching will help relieve any discomfort  You may not operate a vehicle for 12 hours  You may not engage in an occupation involving machinery or appliances for rest of today  You may not drink alcoholic beverages for at least 12 hours  Avoid making any critical decisions for at least 24 hour  DIET:   High fiber diet. - however -  remember your colon is empty and a heavy meal will produce gas. Avoid these foods:  vegetables, fried / greasy foods, carbonated drinks for today  MEDICATION:         ACTIVITY:  You may not resume your normal daily activities until tomorrow AM; it is recommended that you spend the remainder of the day resting -  avoid any strenuous activity. CALL M.D. ANY SIGN OF:   Increasing pain, nausea, vomiting  Abdominal distension (swelling)  New increased bleeding (oral or rectal)  Fever (chills)  Pain in chest area  Bloody discharge from nose or mouth  Shortness of breath    IMPRESSION:  -Normal terminal ileum  -Single benign-appearing 4mm sessile cecal polyp, removed with cold snare  -Single benign-appearing 2mm sessile sigmoid polyp at 40cm, removed with cold snare  -Sigmoid diverticulosis  -Small grade 1 internal hemorrhoids with skin tags  -No evidence of colitis    Follow-up Instructions:   Call Dr. Christophe Sainz for the results of procedure / biopsy in 7-10 days  Telephone # [phone]  If polyps are adenomas, repeat colonoscopy is indicated in 5yrs. If polyps return as hyperplastic polyps, then would repeat colonoscopy in 10yrs.     Lali Palumbo MD

## 2018-12-18 NOTE — ANESTHESIA POSTPROCEDURE EVALUATION
Procedure(s):  COLONOSCOPY  ENDOSCOPIC POLYPECTOMY. Anesthesia Post Evaluation        Patient location during evaluation: PACU  Note status: Adequate. Level of consciousness: responsive to verbal stimuli and sleepy but conscious  Pain management: satisfactory to patient  Airway patency: patent  Anesthetic complications: no  Cardiovascular status: acceptable  Respiratory status: acceptable  Hydration status: acceptable  Comments: +Post-Anesthesia Evaluation and Assessment    Patient: Lopez Cordova MRN: [id number]  SSN: [id number]   YOB: 1960  Age: 60 y.o. Sex: male      Cardiovascular Function/Vital Signs    /72   Pulse 63   Temp 36.5 °C (97.7 °F)   Resp 17   Ht 5' 11\" (1.803 m)   Wt 94.4 kg (208 lb 1 oz)   SpO2 93%   BMI 29.02 kg/m²     Patient is status post Procedure(s):  COLONOSCOPY  ENDOSCOPIC POLYPECTOMY. Nausea/Vomiting: Controlled. Postoperative hydration reviewed and adequate. Pain:  Pain Scale 1: Numeric (0 - 10) (12/18/18 1048)  Pain Intensity 1: 0 (12/18/18 1048)   Managed. Neurological Status: At baseline. Mental Status and Level of Consciousness: Arousable. Pulmonary Status:   O2 Device: Room air (12/18/18 1048)   Adequate oxygenation and airway patent. Complications related to anesthesia: None    Post-anesthesia assessment completed. No concerns.     Signed By: Pineda Parada MD    12/18/2018  Post anesthesia nausea and vomiting:  controlled      Visit Vitals  /72   Pulse 63   Temp 36.5 °C (97.7 °F)   Resp 17   Ht 5' 11\" (1.803 m)   Wt 94.4 kg (208 lb 1 oz)   SpO2 93%   BMI 29.02 kg/m²

## 2018-12-19 NOTE — DISCHARGE SUMMARY
Hospitalist Discharge Summary     Patient ID:  Abdelrahman Leo  [id number]  60 y.o.  1960    PCP on record: Walter Fan MD    Admit date: 12/3/2018  Discharge date and time: 12/18/2018      DISCHARGE DIAGNOSIS:    See below    CONSULTATIONS:  IP CONSULT TO GASTROENTEROLOGY    Excerpted HPI from H&P of Ayana Malloy MD:  Antony De La Rosa is a 60 y.o.  male who presents with abdominal pain and noticing blood in stool. As per patient, he is been noticing blood in stool for past couple of weeks and for past couple of days he started to have abdominal pain. Abdominal pain is in lower belly radiating to back, constant, 7/10 in intensity, cramping in nature. Pt also reported alternating contipation and diarrhea for past 2 weeks. Pt denies any fever, chills, nausea, vomiting, chest pain, cough or shortness of breath. In ED pt noted to have colitis on CT scan.    ______________________________________________________________________  DISCHARGE SUMMARY/HOSPITAL COURSE:  for full details see H&P, daily progress notes, labs, consult notes. Colitis with Hematochezia, Abdominal Pain and leukocytosis  Infectious vs ischemic colitis, improving w abx so suspect more likely infectious colitis  Tolerating cardiac diet  To complete PO levaquin and flagyl  Appreciate GI consult, OP follow up with GI for colonoscopy  Hematochezia POA, resolved  Mild abd tenderness, pain much improved  H/H stable  CT A/P 12/3  Proximal infectious versus inflammatory colitis. No abscess.     HTN (hypertension)   States he was taken off meds in the past  Will place PRN nitropaste  Elevated BP could be due to pain and stress  Monitor, add meds PRN     Smoker  Counseled to quit  Pt deferred nicotine patch at this time    _______________________________________________________________________  Patient seen and examined by me on discharge day.   Pertinent Findings:  Gen:    Not in distress  Chest: Clear lungs  CVS: Regular rhythm. No edema  Abd:  Soft, not distended, not tender  Neuro:  Alert, oriented x 3  _______________________________________________________________________  DISCHARGE MEDICATIONS:   Discharge Medication List as of 12/6/2018  8:54 AM      START taking these medications    Details   amLODIPine (NORVASC) 5 mg tablet Take 1 Tab by mouth daily. , Print, Disp-30 Tab, R-3      levoFLOXacin (LEVAQUIN) 750 mg tablet Take 1 Tab by mouth daily for 8 days. , Print, Disp-8 Tab, R-0      metroNIDAZOLE (FLAGYL) 500 mg tablet Take 1 Tab by mouth every twelve (12) hours for 8 days. , Print, Disp-16 Tab, R-0             My Recommended Diet, Activity, Wound Care, and follow-up labs are listed in the patient's Discharge Insturctions which I have personally completed and reviewed.     ______________________________________________________________________    Risk of deterioration: Low    Condition at Discharge:  Stable  ______________________________________________________________________    Disposition  Home with family, no needs  ______________________________________________________________________    Care Plan discussed with:   Patient, Family, RN, Care Manager, Consultant    ______________________________________________________________________    Code Status: Full Code  ______________________________________________________________________      Follow up with:   PCP : Tigist Bejarano MD  Follow-up Information     Follow up With Specialties Details Why Contact Info    Tigist Bejarano MD Family Practice On 12/11/2018 Hospital follow-up scheduled at 10:10am Please bring Insurance Card, Discharge paperwork, Photo ID, list of medications and any co-pay you may have ( If you have questions or need to reschedule please call [address]  [facility] [address] [facility] [address]  [address] [place] [address]      Mirta Tucker MD Gastroenterology  Dr. Ekta Cruz office will contact you to schedule hospital follow up and possible outpatient colonoscopy.  [address]  [address] [facility]  [phone]                Total time in minutes spent coordinating this discharge (includes going over instructions, follow-up, prescriptions, and preparing report for sign off to her PCP) :  >30  minutes    Signed:  Patricia Sharp DO

## 2019-02-07 ENCOUNTER — HOSPITAL ENCOUNTER (OUTPATIENT)
Dept: LAB | Age: 59
Discharge: HOME | End: 2019-02-07
Payer: MEDICARE

## 2019-02-07 ENCOUNTER — HOSPITAL ENCOUNTER (OUTPATIENT)
Dept: GENERAL RADIOLOGY | Age: 59
Discharge: HOME | End: 2019-02-07
Payer: MEDICARE

## 2019-02-07 ENCOUNTER — OFFICE VISIT (OUTPATIENT)
Dept: FAMILY MEDICINE CLINIC | Age: 59
End: 2019-02-07

## 2019-02-07 VITALS
TEMPERATURE: 96.9 F | WEIGHT: 215.4 LBS | HEIGHT: 71 IN | OXYGEN SATURATION: 94 % | DIASTOLIC BLOOD PRESSURE: 84 MMHG | RESPIRATION RATE: 18 BRPM | HEART RATE: 61 BPM | SYSTOLIC BLOOD PRESSURE: 120 MMHG | BODY MASS INDEX: 30.15 KG/M2

## 2019-02-07 DIAGNOSIS — Z00.00 MEDICARE ANNUAL WELLNESS VISIT, SUBSEQUENT: Primary | ICD-10-CM

## 2019-02-07 DIAGNOSIS — M47.26 OSTEOARTHRITIS OF SPINE WITH RADICULOPATHY, LUMBAR REGION: ICD-10-CM

## 2019-02-07 DIAGNOSIS — Z13.1 SCREENING FOR DIABETES MELLITUS: ICD-10-CM

## 2019-02-07 DIAGNOSIS — F17.200 SMOKER: ICD-10-CM

## 2019-02-07 DIAGNOSIS — Z13.220 SCREENING CHOLESTEROL LEVEL: ICD-10-CM

## 2019-02-07 DIAGNOSIS — I10 ESSENTIAL HYPERTENSION: ICD-10-CM

## 2019-02-07 DIAGNOSIS — M54.31 SCIATICA OF RIGHT SIDE: ICD-10-CM

## 2019-02-07 DIAGNOSIS — Z79.899 ENCOUNTER FOR LONG-TERM (CURRENT) USE OF MEDICATIONS: ICD-10-CM

## 2019-02-07 DIAGNOSIS — R35.1 NOCTURIA: ICD-10-CM

## 2019-02-07 DIAGNOSIS — Z11.59 NEED FOR HEPATITIS C SCREENING TEST: ICD-10-CM

## 2019-02-07 PROCEDURE — 80076 HEPATIC FUNCTION PANEL: CPT

## 2019-02-07 PROCEDURE — 87902 NFCT AGT GNTYP ALYS HEP C: CPT

## 2019-02-07 PROCEDURE — 84443 ASSAY THYROID STIM HORMONE: CPT

## 2019-02-07 PROCEDURE — 83036 HEMOGLOBIN GLYCOSYLATED A1C: CPT

## 2019-02-07 PROCEDURE — 72100 X-RAY EXAM L-S SPINE 2/3 VWS: CPT

## 2019-02-07 PROCEDURE — 84153 ASSAY OF PSA TOTAL: CPT

## 2019-02-07 PROCEDURE — 36415 COLL VENOUS BLD VENIPUNCTURE: CPT

## 2019-02-07 PROCEDURE — 80061 LIPID PANEL: CPT

## 2019-02-07 RX ORDER — IBUPROFEN 200 MG
600 TABLET ORAL
COMMUNITY
End: 2021-09-29 | Stop reason: ALTCHOICE

## 2019-02-07 RX ORDER — PREDNISONE 10 MG/1
TABLET ORAL
Qty: 21 TAB | Refills: 0 | Status: SHIPPED | OUTPATIENT
Start: 2019-02-07 | End: 2019-02-22 | Stop reason: ALTCHOICE

## 2019-02-07 NOTE — PROGRESS NOTES
This is a [facility] Exam ([address]) I have reviewed the patient's medical history in detail and updated the computerized patient record. Lucio Saleh is a 60 y.o. male 
 
 has a past medical history of Chronic pain, Colitis (12/3/2018), Degenerative disc disease, lumbar, Essential hypertension (12/3/2018), GERD (gastroesophageal reflux disease), H/O hernia repair (12/11/2018), History of cholecystectomy (12/11/2018), Ill-defined condition, Osteoarthritis of spine with radiculopathy, lumbar region (12/11/2018), and Smoker (12/11/2018). Colonoscopy 12/2018 showed 2 polyps, next due 5 years. Smoker 1ppd X 40 years 
  
Lumbar DJD hx. Angela Estimable about 30 injection in his spines\". But have headache afterward. Was supposed to have surgery 6-7 years ago. Sciatica on right side. Denies loss of bowel or bladder, saddle anesthesia or other redflags. Have been taking ibuprofen 1200-1600mg daily for the past 2 weeks b/c of back pain and muscle spasm. Says he have tried gabapentin and it didn't help, and he doesn't want to bother with it. We'll do a course of prednisone taper HTN: BP at goal today, continue amlodipine. Nocturia, weak urine stream 
 
Allergies to aspirin History Past Medical History:  
Diagnosis Date  Chronic pain   
 back  Colitis 12/3/2018  Degenerative disc disease, lumbar  Essential hypertension 12/3/2018  GERD (gastroesophageal reflux disease)   
 hiatal hernia  H/O hernia repair 12/11/2018  History of cholecystectomy 12/11/2018  Ill-defined condition   
 ruptured gall bladder  Osteoarthritis of spine with radiculopathy, lumbar region 12/11/2018  Smoker 12/11/2018 Past Surgical History:  
Procedure Laterality Date  COLONOSCOPY N/A 12/18/2018 [facility] performed by Luz [facility]arg [facility] at [facility] ENDOSCOPY  COLONOSCOPY,ANT SERNA,SNARE  12/18/2018  HX COLONOSCOPY    
 HX GI    
 galbladder removal  
  HX ORTHOPAEDIC    
 lt shoulder, muscle and skin graft  HX ORTHOPAEDIC    
 rt shoulder, anchors in place  HX ORTHOPAEDIC    
 rt foot, removed bone spur by great toe  HX ORTHOPAEDIC    
 rt hand, removed infected and cyst on wrist  
 HX OTHER SURGICAL    
 incisional hernia Current Outpatient Medications Medication Sig Dispense Refill  ibuprofen (MOTRIN) 200 mg tablet Take 600 mg by mouth every eight (8) hours as needed for Pain.  predniSONE (STERAPRED DS) 10 mg dose pack See administration instruction per 10mg dose pack 21 Tab 0  
 amLODIPine (NORVASC) 5 mg tablet Take 1 Tab by mouth daily. 30 Tab 3 Allergies Allergen Reactions  Aspirin Other (comments) Nose bleeds  Morphine Other (comments) Agitation Family History Problem Relation Age of Onset  Cancer Mother  Cancer Father Social History Tobacco Use  Smoking status: Former Smoker Packs/day: 1.00 Years: 40.00 Pack years: 40.00 Last attempt to quit: 12/3/2018 Years since quittin.1  Smokeless tobacco: Never Used Substance Use Topics  Alcohol use: Yes Comment: rarely Patient Active Problem List  
Diagnosis Code  Colitis K52.9  Essential hypertension I10  
 Osteoarthritis of spine with radiculopathy, lumbar region M47.26  
 Smoker F17.200  
 History of cholecystectomy Z90.49  
 H/O hernia repair Z98.890, Z87.19 Depression Risk Factor Screening: PHQ over the last two weeks 2019 Little interest or pleasure in doing things Nearly every day Feeling down, depressed, irritable, or hopeless Not at all Total Score PHQ 2 3 Trouble falling or staying asleep, or sleeping too much Nearly every day Feeling tired or having little energy Nearly every day Poor appetite, weight loss, or overeating Not at all Feeling bad about yourself - or that you are a failure or have let yourself or your family down Not at all Trouble concentrating on things such as school, work, reading, or watching TV Not at all Moving or speaking so slowly that other people could have noticed; or the opposite being so fidgety that others notice Not at all Thoughts of being better off dead, or hurting yourself in some way Not at all PHQ 9 Score 9 How difficult have these problems made it for you to do your work, take care of your home and get along with others Extremely difficult Alcohol Risk Factor Screening: You do not drink alcohol or very rarely. Functional Ability and Level of Safety:  
 
Hearing Loss Hearing is good. Activities of Daily Living Self-care. Requires assistance with: no ADLs Fall Risk No flowsheet data found. Abuse Screen Patient is not abused Review of Systems A comprehensive review of systems was negative except for that written in the HPI. Physical Examination Evaluation of Cognitive Function: 
Mood/affect:  \"tired and ache all the time\". Appearance: age appropriate, bearded and casually dressed Family member/caregiver input: none present Visit Vitals /84 Pulse 61 Temp 96.9 °F (36.1 °C) (Oral) Resp 18 Ht 5' 11\" (1.803 m) Wt 215 lb 6.4 oz (97.7 kg) SpO2 94% BMI 30.04 kg/m² Visit Vitals /84 Pulse 61 Temp 96.9 °F (36.1 °C) (Oral) Resp 18 Ht 5' 11\" (1.803 m) Wt 215 lb 6.4 oz (97.7 kg) SpO2 94% BMI 30.04 kg/m² General:  Alert, cooperative, no distress, appears stated age. Head:  Normocephalic, without obvious abnormality, atraumatic. Eyes:  Conjunctivae/corneas clear. PERRL, EOMs intact. Fundi benign Neck: Supple, symmetrical, trachea midline, no adenopathy, thyroid: no enlargement/tenderness/nodules, no carotid bruit and no JVD. Lungs:   Clear to auscultation bilaterally. Heart:  Regular rate and rhythm, S1, S2 normal, no murmur, click, rub or gallop. Abdomen:   Soft, non-tender.   
Genitalia:  Pt refuse exam  
 Extremities: Extremities normal, atraumatic, no cyanosis or edema. Neurologic: CNII-XII intact. Normal strength, sensation and reflexes throughout. Patient Care Team: 
Chanel Weldon MD as PCP - [facility]) Advice/Referrals/Counseling Education and counseling provided: 
Are appropriate based on today's review and evaluation End-of-Life planning (with patient's consent) Pneumococcal Vaccine Influenza Vaccine Prostate cancer screening tests (PSA, covered annually) Diabetes screening test 
 
Assessment/Plan Diagnoses and all orders for this visit: 
 
1. Medicare annual wellness visit, subsequent 2. Essential hypertension 
-     TSH 3RD GENERATION 3. Smoker 4. Screening for diabetes mellitus 
-     HEMOGLOBIN A1C W/O EAG 
 
5. Screening cholesterol level -     LIPID PANEL 
-     HEPATIC FUNCTION PANEL 6. Need for hepatitis C screening test 
-     HEP C GENOTYPE 
 
7. Encounter for long-term (current) use of medications 
-     HEMOGLOBIN A1C W/O EAG 
-     LIPID PANEL 
-     HEPATIC FUNCTION PANEL 
-     HEP C GENOTYPE 
-     PSA W/ REFLX FREE PSA 
-     TSH 3RD GENERATION 8. Sciatica of right side 
-     REFERRAL TO ORTHOPEDICS 
-     XR SPINE LUMB 2 OR 3 V; Future -     predniSONE (STERAPRED DS) 10 mg dose pack; See administration instruction per 10mg dose pack 9. Osteoarthritis of spine with radiculopathy, lumbar region 
-     REFERRAL TO ORTHOPEDICS 
-     XR SPINE LUMB 2 OR 3 V; Future -     predniSONE (STERAPRED DS) 10 mg dose pack; See administration instruction per 10mg dose pack 10. Nocturia -     PSA W/ REFLX FREE PSA Gae Shay Follow-up Disposition: 
Return in about 2 weeks (around 2/21/2019) for XR, labs, LUTS. David Marsh MD 
2/7/2019.

## 2019-02-07 NOTE — PROGRESS NOTES
Chief Complaint Patient presents with [facility] Annual Wellness Visit 1. Have you been to the ER, urgent care clinic since your last visit? Hospitalized since your last visit? no 
 
2. Have you seen or consulted any other health care providers outside of the [address] [place] [address] since your last visit? Include any pap smears or colon screening.  no

## 2019-02-11 LAB
ALBUMIN SERPL-MCNC: 4.6 G/DL (ref 3.5–5.5)
ALP SERPL-CCNC: 66 IU/L (ref 39–117)
ALT SERPL-CCNC: 29 IU/L (ref 0–44)
AST SERPL-CCNC: 29 IU/L (ref 0–40)
BILIRUB DIRECT SERPL-MCNC: 0.11 MG/DL (ref 0–0.4)
BILIRUB SERPL-MCNC: 0.3 MG/DL (ref 0–1.2)
CHOLEST SERPL-MCNC: 189 MG/DL (ref 100–199)
HBA1C MFR BLD: 6.4 % (ref 4.8–5.6)
HCV GENTYP SERPL NAA+PROBE: NORMAL
HDLC SERPL-MCNC: 42 MG/DL
LDLC SERPL CALC-MCNC: 112 MG/DL (ref 0–99)
PLEASE NOTE, 550474: NORMAL
PROT SERPL-MCNC: 7 G/DL (ref 6–8.5)
PSA SERPL-MCNC: 0.6 NG/ML (ref 0–4)
REFLEX CRITERIA: NORMAL
TRIGL SERPL-MCNC: 175 MG/DL (ref 0–149)
TSH SERPL DL<=0.005 MIU/L-ACNC: 2.94 UIU/ML (ref 0.45–4.5)
VLDLC SERPL CALC-MCNC: 35 MG/DL (ref 5–40)

## 2019-02-22 ENCOUNTER — OFFICE VISIT (OUTPATIENT)
Dept: FAMILY MEDICINE CLINIC | Age: 59
End: 2019-02-22

## 2019-02-22 VITALS
SYSTOLIC BLOOD PRESSURE: 133 MMHG | BODY MASS INDEX: 30.66 KG/M2 | RESPIRATION RATE: 18 BRPM | HEART RATE: 80 BPM | TEMPERATURE: 97.4 F | DIASTOLIC BLOOD PRESSURE: 76 MMHG | WEIGHT: 219 LBS | OXYGEN SATURATION: 95 % | HEIGHT: 71 IN

## 2019-02-22 DIAGNOSIS — E78.2 MIXED HYPERLIPIDEMIA: ICD-10-CM

## 2019-02-22 DIAGNOSIS — N40.1 BENIGN LOCALIZED PROSTATIC HYPERPLASIA WITH LOWER URINARY TRACT SYMPTOMS (LUTS): ICD-10-CM

## 2019-02-22 DIAGNOSIS — I10 ESSENTIAL HYPERTENSION: Primary | ICD-10-CM

## 2019-02-22 DIAGNOSIS — Z79.899 ENCOUNTER FOR LONG-TERM (CURRENT) USE OF MEDICATIONS: ICD-10-CM

## 2019-02-22 DIAGNOSIS — R73.03 PREDIABETES: ICD-10-CM

## 2019-02-22 DIAGNOSIS — M54.31 SCIATICA OF RIGHT SIDE: ICD-10-CM

## 2019-02-22 RX ORDER — TAMSULOSIN HYDROCHLORIDE 0.4 MG/1
0.4 CAPSULE ORAL DAILY
Qty: 30 CAP | Refills: 2
Start: 2019-02-22 | End: 2019-04-10 | Stop reason: SDUPTHER

## 2019-02-22 RX ORDER — PREGABALIN 75 MG/1
75 CAPSULE ORAL 2 TIMES DAILY
Qty: 60 CAP | Refills: 1 | Status: SHIPPED | OUTPATIENT
Start: 2019-02-22 | End: 2019-12-03 | Stop reason: ALTCHOICE

## 2019-02-22 RX ORDER — ATORVASTATIN CALCIUM 10 MG/1
10 TABLET, FILM COATED ORAL DAILY
Qty: 30 TAB | Refills: 2 | Status: SHIPPED | OUTPATIENT
Start: 2019-02-22 | End: 2019-05-22 | Stop reason: SDUPTHER

## 2019-02-22 RX ORDER — AMLODIPINE BESYLATE 5 MG/1
5 TABLET ORAL DAILY
Qty: 90 TAB | Refills: 1 | Status: SHIPPED | OUTPATIENT
Start: 2019-02-22 | End: 2019-02-22

## 2019-02-22 RX ORDER — METOPROLOL SUCCINATE 25 MG/1
25 TABLET, EXTENDED RELEASE ORAL DAILY
Qty: 30 TAB | Refills: 2 | Status: SHIPPED | OUTPATIENT
Start: 2019-02-22 | End: 2019-05-22

## 2019-02-22 NOTE — LETTER
2/22/2019 9:46 AM 
 
Mr. Yohannes Mckeon 
[address] ANAYELI Gupta [address] [place] [phone] Dear Yohannes Mckeon: 
 
Please find your most recent results below. XR SPINE LUMB 2 OR 3 V Narrative INDICATION: Chronic low back pain with right-sided sciatica COMPARISON: None FINDINGS:  
 
3 views of the lumbar spine submitted for review. Minimal levoscoliosis of the lumbar spine. No other significant malalignment. Moderate to severe L4-5 and moderate L5-S1 degenerative disc disease with lower 
lumbosacral facet arthropathy. No evidence for acute fracture Impression IMPRESSION: 
 
Moderate to severe lower lumbosacral degenerative changes without acute 
abnormality Sincerely, Sincere Lazaro MD

## 2019-02-22 NOTE — PROGRESS NOTES
Sheeba Cifuentes is a 60 y.o. male 
 
 has a past medical history of Chronic pain, Colitis (12/3/2018), Degenerative disc disease, lumbar, Essential hypertension (12/3/2018), GERD (gastroesophageal reflux disease), H/O hernia repair (12/11/2018), History of cholecystectomy (12/11/2018), Ill-defined condition, Osteoarthritis of spine with radiculopathy, lumbar region (12/11/2018), and Smoker (12/11/2018). Lumbar DJD hx. Titus Pock about 30 injection in his spines\". But have headache afterward. Was supposed to have surgery 6-7 years ago. Sciatica on right side. Denies loss of bowel or bladder, saddle anesthesia or other redflags. Have been taking ibuprofen 1200-1600mg daily for the past 2 weeks b/c of back pain and muscle spasm. Says he have tried gabapentin and it didn't help, and he doesn't want to bother with it. Last visit prednisone course didn't help. Report was on gabapentin before. Has appt with Ortho on 03/06/2019 
  
HTN: BP at goal today. Given his higher risk for CAD, we'll change amlodipine to Metoprolol Start metoprolol 25mg.  
  
HLD: Trigly 175 and . Hx of HTN, Smoker, age 60, Male and also borderline diabetes. We'll start him on Lipitor 10mg. LUTS: Nocturia, weak urine stream. PSA is 0.6, WNL. To start Flomax 0.4mg. Prediabetes: discussed pathophysiology and risk, complication. For now he want to try with diet and weight loss, we'll recheck in 3 months. BMI 30: Discussed the patient's BMI with him. The BMI follow up plan is as follows:  
dietary management education, guidance, and counseling encourage exercise monitor weight prescribed dietary intake Colonoscopy 12/2018 showed 2 polyps, next due 5 years.  
  
Smoker 1ppd X 40 years  
 
Allergies to aspirin Reviewed: active problem list, medication list, allergies, notes from last encounter A comprehensive review of systems was negative except for that written in the HPI. Results for orders placed or performed in visit on 02/07/19 HEMOGLOBIN A1C W/O EAG Result Value Ref Range Hemoglobin A1c 6.4 (H) 4.8 - 5.6 % LIPID PANEL Result Value Ref Range Cholesterol, total 189 100 - 199 mg/dL Triglyceride 175 (H) 0 - 149 mg/dL HDL Cholesterol 42 >39 mg/dL VLDL, calculated 35 5 - 40 mg/dL LDL, calculated 112 (H) 0 - 99 mg/dL HEPATIC FUNCTION PANEL Result Value Ref Range Protein, total 7.0 6.0 - 8.5 g/dL Albumin 4.6 3.5 - 5.5 g/dL Bilirubin, total 0.3 0.0 - 1.2 mg/dL Bilirubin, direct 0.11 0.00 - 0.40 mg/dL Alk. phosphatase 66 39 - 117 IU/L  
 AST (SGOT) 29 0 - 40 IU/L  
 ALT (SGPT) 29 0 - 44 IU/L  
HEP C GENOTYPE Result Value Ref Range Hepatitis C Genotype Comment Please note Comment PSA W/ REFLX FREE PSA Result Value Ref Range Prostate Specific Ag 0.6 0.0 - 4.0 ng/mL Reflex Criteria Comment TSH 3RD GENERATION Result Value Ref Range TSH 2.940 0.450 - 4.500 uIU/mL Allergies Allergen Reactions  Aspirin Other (comments) Nose bleeds  Morphine Other (comments) Agitation Current Outpatient Medications on File Prior to Visit Medication Sig Dispense Refill  ibuprofen (MOTRIN) 200 mg tablet Take 600 mg by mouth every eight (8) hours as needed for Pain. No current facility-administered medications on file prior to visit. Patient Active Problem List  
Diagnosis Code  Colitis K52.9  Essential hypertension I10  
 Osteoarthritis of spine with radiculopathy, lumbar region M47.26  
 Smoker F17.200  
 History of cholecystectomy Z90.49  
 H/O hernia repair Z98.890, Z87.19 Visit Vitals /76 Pulse 80 Temp 97.4 °F (36.3 °C) (Oral) Resp 18 Ht 5' 11\" (1.803 m) Wt 219 lb (99.3 kg) SpO2 95% BMI 30.54 kg/m² General appearance: alert, cooperative, no distress, appears stated age Neurologic: Alert and oriented X 3, normal strength and tone, symmetric. Normal without focal findings. Cranial nerves 2-12 intact. Normal coordination and gait. Mental status: Alert, oriented, thought content appropriate, affect: stable, mood-congruent. Head: Normocephalic, without obvious abnormality, atraumatic Eyes: conjunctivae/corneas clear. PERRL, EOM's intact. Neck: supple, symmetrical, trachea midline, no JVD Lungs: clear to auscultation bilaterally Heart: regular rate and rhythm, S1, S2 normal, no murmur, click, rub or gallop Abdomen: soft, non-tender. Extremities: extremities normal, atraumatic, no cyanosis or edema Assessment/Plans: 
 
Diagnoses and all orders for this visit: 1. Essential hypertension -     METABOLIC PANEL, COMPREHENSIVE 
-     metoprolol succinate (TOPROL-XL) 25 mg XL tablet; Take 1 Tab by mouth daily. 2. Mixed hyperlipidemia 
-     atorvastatin (LIPITOR) 10 mg tablet; Take 1 Tab by mouth daily. 
-     METABOLIC PANEL, COMPREHENSIVE 3. Prediabetes 
-     HEMOGLOBIN A1C W/O EAG 4. Sciatica of right side -     pregabalin (LYRICA) 75 mg capsule; Take 1 Cap by mouth two (2) times a day. Max Daily Amount: 150 mg. 
 
5. Benign localized prostatic hyperplasia with lower urinary tract symptoms (LUTS) -     tamsulosin (FLOMAX) 0.4 mg capsule; Take 1 Cap by mouth daily. 6. Encounter for long-term (current) use of medications -     METABOLIC PANEL, COMPREHENSIVE 
-     HEMOGLOBIN A1C W/O EAG Discussed plans, risk/benefits of treatments/observations. Through the use of shared decision making, above plans were agreed upon. Medication compliance advised. Patient verbalized understanding. Follow-up Disposition: 
Return in about 3 months (around 5/22/2019) for HTN, Prediabetes, HLD, meds, labs. Oskar Nelson MD 
2/22/2019

## 2019-02-22 NOTE — PROGRESS NOTES
Chief Complaint Patient presents with  Results 1. Have you been to the ER, urgent care clinic since your last visit? Hospitalized since your last visit? no 
 
2. Have you seen or consulted any other health care providers outside of the [address] [place] [address] since your last visit? Include any pap smears or colon screening.  no

## 2019-02-22 NOTE — LETTER
2/22/2019 9:45 AM 
 
Mr. Carin Rosales 
[address] [place] [facility] [address] [place] [phone] Dear Carin Rosales: 
 
Please find your most recent results below. Resulted Orders HEMOGLOBIN A1C W/O EAG Result Value Ref Range Hemoglobin A1c 6.4 (H) 4.8 - 5.6 % Comment:  
            Prediabetes: 5.7 - 6.4 Diabetes: >6.4 Glycemic control for adults with diabetes: <7.0 Narrative Performed at:  [address]  [id number] : Sam Rodriguez MD, Phone:  [phone] LIPID PANEL Result Value Ref Range Cholesterol, total 189 100 - 199 mg/dL Triglyceride 175 (H) 0 - 149 mg/dL HDL Cholesterol 42 >39 mg/dL VLDL, calculated 35 5 - 40 mg/dL LDL, calculated 112 (H) 0 - 99 mg/dL Narrative Performed at:  [address]  [id number] : Sam Rodriguez MD, Phone:  [phone] HEPATIC FUNCTION PANEL Result Value Ref Range Protein, total 7.0 6.0 - 8.5 g/dL Albumin 4.6 3.5 - 5.5 g/dL Bilirubin, total 0.3 0.0 - 1.2 mg/dL Bilirubin, direct 0.11 0.00 - 0.40 mg/dL Alk. phosphatase 66 39 - 117 IU/L  
 AST (SGOT) 29 0 - 40 IU/L  
 ALT (SGPT) 29 0 - 44 IU/L Narrative Performed at:  [address]  [id number] : Sam Rodriguez MD, Phone:  [phone] HEP C GENOTYPE Result Value Ref Range Hepatitis C Genotype Comment Comment:  
   Specimen has insufficient hepatitis C virus RNA to obtain genotyping 
results. This genotyping assay should only be used for known HCV 
positive patients with HCV RNA levels above 1000 IU/mL. Please note Comment Comment: This test was developed and its performance characteristics determined 
by [facility].  It has not been cleared or approved by the U.[place]. Food and 
Drug Administration. The FDA has determined that such clearance or approval is not 
necessary. This test is used for clinical purposes. It should not be 
regarded as investigational or for research. Narrative Performed at:  [address]  [id number] : Lam Sorto MD, Phone:  [phone] PSA W/ REFLX FREE PSA Result Value Ref Range Prostate Specific Ag 0.6 0.0 - 4.0 ng/mL Comment:  
   Roche ECLIA methodology. According to the [employer] Association, Serum PSA should 
decrease and remain at undetectable levels after radical 
prostatectomy. The AUA defines biochemical recurrence as an initial 
PSA value 0.2 ng/mL or greater followed by a subsequent confirmatory PSA value 0.2 ng/mL or greater. Values obtained with different assay methods or kits cannot be used 
interchangeably. Results cannot be interpreted as absolute evidence 
of the presence or absence of malignant disease. Reflex Criteria Comment Comment:  
   The percent free PSA is performed on a reflex basis only when the 
total PSA is between 4.0 and 10.0 ng/mL. Narrative Performed at:  [address]  [id number] : Lam Sorto MD, Phone:  [phone] TSH 3RD GENERATION Result Value Ref Range TSH 2.940 0.450 - 4.500 uIU/mL Narrative Performed at:  [address]  [id number] : Lam Sorto MD, Phone:  [phone] Sincerely, Jhon Barnes MD

## 2019-02-22 NOTE — PATIENT INSTRUCTIONS
Body Mass Index: Care Instructions Your Care Instructions Body mass index (BMI) can help you see if your weight is raising your risk for health problems. It uses a formula to compare how much you weigh with how tall you are. · A BMI lower than 18.5 is considered underweight. · A BMI between 18.5 and 24.9 is considered healthy. · A BMI between 25 and 29.9 is considered overweight. A BMI of 30 or higher is considered obese. If your BMI is in the normal range, it means that you have a lower risk for weight-related health problems. If your BMI is in the overweight or obese range, you may be at increased risk for weight-related health problems, such as high blood pressure, heart disease, stroke, arthritis or joint pain, and diabetes. If your BMI is in the underweight range, you may be at increased risk for health problems such as fatigue, lower protection (immunity) against illness, muscle loss, bone loss, hair loss, and hormone problems. BMI is just one measure of your risk for weight-related health problems. You may be at higher risk for health problems if you are not active, you eat an unhealthy diet, or you drink too much alcohol or use tobacco products. Follow-up care is a key part of your treatment and safety. Be sure to make and go to all appointments, and call your doctor if you are having problems. It's also a good idea to know your test results and keep a list of the medicines you take. How can you care for yourself at home? · Practice healthy eating habits. This includes eating plenty of fruits, vegetables, whole grains, lean protein, and low-fat dairy. · If your doctor recommends it, get more exercise. Walking is a good choice. Bit by bit, increase the amount you walk every day. Try for at least 30 minutes on most days of the week. · Do not smoke. Smoking can increase your risk for health problems.  If you need help quitting, talk to your doctor about stop-smoking programs and medicines. These can increase your chances of quitting for good. · Limit alcohol to 2 drinks a day for men and 1 drink a day for women. Too much alcohol can cause health problems. If you have a BMI higher than 25 · Your doctor may do other tests to check your risk for weight-related health problems. This may include measuring the distance around your waist. A waist measurement of more than 40 inches in men or 35 inches in women can increase the risk of weight-related health problems. · Talk with your doctor about steps you can take to stay healthy or improve your health. You may need to make lifestyle changes to lose weight and stay healthy, such as changing your diet and getting regular exercise. If you have a BMI lower than 18.5 · Your doctor may do other tests to check your risk for health problems. · Talk with your doctor about steps you can take to stay healthy or improve your health. You may need to make lifestyle changes to gain or maintain weight and stay healthy, such as getting more healthy foods in your diet and doing exercises to build muscle. Where can you learn more? Go to http://sheila-mario.info/. Enter S176 in the search box to learn more about \"Body Mass Index: Care Instructions. \" Current as of: October 13, 2016 Content Version: 11.4 © 5115-9645 [employer], [employer]. Care instructions adapted under license by [employer] (which disclaims liability or warranty for this information). If you have questions about a medical condition or this instruction, always ask your healthcare professional. [address] any warranty or liability for your use of this information. Learning About Diabetes Food Guidelines Your Care Instructions Meal planning is important to manage diabetes. It helps keep your blood sugar at a target level (which you set with your doctor).  You don't have to eat special foods. You can eat what your family eats, including sweets once in a while. But you do have to pay attention to how often you eat and how much you eat of certain foods. You may want to work with a dietitian or a certified diabetes educator (CDE) to help you plan meals and snacks. A dietitian or CDE can also help you lose weight if that is one of your goals. What should you know about eating carbs? Managing the amount of carbohydrate (carbs) you eat is an important part of healthy meals when you have diabetes. Carbohydrate is found in many foods. · Learn which foods have carbs. And learn the amounts of carbs in different foods. ? Bread, cereal, pasta, and rice have about 15 grams of carbs in a serving. A serving is 1 slice of bread (1 ounce), ½ cup of cooked cereal, or 1/3 cup of cooked pasta or rice. ? Fruits have 15 grams of carbs in a serving. A serving is 1 small fresh fruit, such as an apple or orange; ½ of a banana; ½ cup of cooked or canned fruit; ½ cup of fruit juice; 1 cup of melon or raspberries; or 2 tablespoons of dried fruit. ? Milk and no-sugar-added yogurt have 15 grams of carbs in a serving. A serving is 1 cup of milk or 2/3 cup of no-sugar-added yogurt. ? Starchy vegetables have 15 grams of carbs in a serving. A serving is ½ cup of mashed potatoes or sweet potato; 1 cup winter squash; ½ of a small baked potato; ½ cup of cooked beans; or ½ cup cooked corn or green peas. · Learn how much carbs to eat each day and at each meal. A dietitian or CDE can teach you how to keep track of the amount of carbs you eat. This is called carbohydrate counting. · If you are not sure how to count carbohydrate grams, use the Plate Method to plan meals. It is a good, quick way to make sure that you have a balanced meal. It also helps you spread carbs throughout the day. ? Divide your plate by types of foods.  Put non-starchy vegetables on half the plate, meat or other protein food on one-quarter of the plate, and a grain or starchy vegetable in the final quarter of the plate. To this you can add a small piece of fruit and 1 cup of milk or yogurt, depending on how many carbs you are supposed to eat at a meal. 
· Try to eat about the same amount of carbs at each meal. Do not \"save up\" your daily allowance of carbs to eat at one meal. 
· Proteins have very little or no carbs per serving. Examples of proteins are beef, chicken, turkey, fish, eggs, tofu, cheese, cottage cheese, and peanut butter. A serving size of meat is 3 ounces, which is about the size of a deck of cards. Examples of meat substitute serving sizes (equal to 1 ounce of meat) are 1/4 cup of cottage cheese, 1 egg, 1 tablespoon of peanut butter, and ½ cup of tofu. How can you eat out and still eat healthy? · Learn to estimate the serving sizes of foods that have carbohydrate. If you measure food at home, it will be easier to estimate the amount in a serving of restaurant food. · If the meal you order has too much carbohydrate (such as potatoes, corn, or baked beans), ask to have a low-carbohydrate food instead. Ask for a salad or green vegetables. · If you use insulin, check your blood sugar before and after eating out to help you plan how much to eat in the future. · If you eat more carbohydrate at a meal than you had planned, take a walk or do other exercise. This will help lower your blood sugar. What else should you know? · Limit saturated fat, such as the fat from meat and dairy products. This is a healthy choice because people who have diabetes are at higher risk of heart disease. So choose lean cuts of meat and nonfat or low-fat dairy products. Use olive or canola oil instead of butter or shortening when cooking. · Don't skip meals. Your blood sugar may drop too low if you skip meals and take insulin or certain medicines for diabetes. · Check with your doctor before you drink alcohol. Alcohol can cause your blood sugar to drop too low. Alcohol can also cause a bad reaction if you take certain diabetes medicines. Follow-up care is a key part of your treatment and safety. Be sure to make and go to all appointments, and call your doctor if you are having problems. It's also a good idea to know your test results and keep a list of the medicines you take. Where can you learn more? Go to http://sheila-mario.info/. Enter Y011 in the search box to learn more about \"Learning About Diabetes Food Guidelines. \" Current as of: July 25, 2018 Content Version: 11.9 © 0330-6350 [employer]. Care instructions adapted under license by [employer] (which disclaims liability or warranty for this information). If you have questions about a medical condition or this instruction, always ask your healthcare professional. [address] any warranty or liability for your use of this information. Learning About Type 2 Diabetes What is type 2 diabetes? Insulin is a hormone that helps your body use sugar from your food as energy. Type 2 diabetes happens when your body can't use insulin the right way. Over time, the pancreas can't make enough insulin. If you don't have enough insulin, too much sugar stays in your blood. If you are overweight, get little or no exercise, or have type 2 diabetes in your family, you are more likely to have problems with the way insulin works in your body.  Americans, Hispanics, Native Americans,  Americans, and Pacific Islanders have a higher risk for type 2 diabetes. Type 2 diabetes can be prevented or delayed with a healthy lifestyle, which includes staying at a healthy weight, making smart food choices, and getting regular exercise. What can you expect with type 2 diabetes?  
Harry Velasquez keep hearing about how important it is to keep your blood sugar within a target range. That's because over time, high blood sugar can lead to serious problems. It can: 
· Harm your eyes, nerves, and kidneys. · Damage your blood vessels, leading to heart disease and stroke. · Reduce blood flow and cause nerve damage to parts of your body, especially your feet. This can cause slow healing and pain when you walk. · Make your immune system weak and less able to fight infections. When people hear the word \"diabetes,\" they often think of problems like these. But daily care and treatment can help prevent or delay these problems. The goal is to keep your blood sugar in a target range. That's the best way to reduce your chance of having more problems from diabetes. What are the symptoms? Some people who have type 2 diabetes may not have any symptoms early on. Many people with the disease don't even know they have it at first. But with time, diabetes starts to cause symptoms. You experience most symptoms of type 2 diabetes when your blood sugar is either too high or too low. The most common symptoms of high blood sugar include: · Thirst. 
· Frequent urination. · Weight loss. · Blurry vision. The symptoms of low blood sugar include: · Sweating. · Shakiness. · Weakness. · Hunger. · Confusion. How can you prevent type 2 diabetes? The best way to prevent or delay type 2 diabetes is to adopt healthy habits, which include: 
· Staying at a healthy weight. · Exercising regularly. · Eating healthy foods. How is type 2 diabetes treated? If you have type 2 diabetes, here are the most important things you can do. · Take your diabetes medicines. · Check your blood sugar as often as your doctor recommends. Also, get a hemoglobin A1c test at least every 6 months. · Try to eat a variety of foods and to spread carbohydrate throughout the day. Carbohydrate raises blood sugar higher and more quickly than any other nutrient does.  Carbohydrate is found in sugar, breads and cereals, fruit, starchy vegetables such as potatoes and corn, and milk and yogurt. · Get at least 30 minutes of exercise on most days of the week. Walking is a good choice. You also may want to do other activities, such as running, swimming, cycling, or playing tennis or team sports. If your doctor says it's okay, do muscle-strengthening exercises at least 2 times a week. · See your doctor for checkups and tests on a regular schedule. · If you have high blood pressure or high cholesterol, take the medicines as prescribed by your doctor. · Do not smoke. Smoking can make health problems worse. This includes problems you might have with type 2 diabetes. If you need help quitting, talk to your doctor about stop-smoking programs and medicines. These can increase your chances of quitting for good. Follow-up care is a key part of your treatment and safety. Be sure to make and go to all appointments, and call your doctor if you are having problems. It's also a good idea to know your test results and keep a list of the medicines you take. Where can you learn more? Go to http://sheila-mario.info/. Enter I270 in the search box to learn more about \"Learning About Type 2 Diabetes. \" Current as of: July 25, 2018 Content Version: 11.9 © 6273-2626 [employer]. Care instructions adapted under license by [employer] (which disclaims liability or warranty for this information). If you have questions about a medical condition or this instruction, always ask your healthcare professional. [place] [address] any warranty or liability for your use of this information. Learning About Diabetes Food Guidelines Your Care Instructions Meal planning is important to manage diabetes. It helps keep your blood sugar at a target level (which you set with your doctor). You don't have to eat special foods.  You can eat what your family eats, including sweets once in a while. But you do have to pay attention to how often you eat and how much you eat of certain foods. You may want to work with a dietitian or a certified diabetes educator (CDE) to help you plan meals and snacks. A dietitian or CDE can also help you lose weight if that is one of your goals. What should you know about eating carbs? Managing the amount of carbohydrate (carbs) you eat is an important part of healthy meals when you have diabetes. Carbohydrate is found in many foods. · Learn which foods have carbs. And learn the amounts of carbs in different foods. ? Bread, cereal, pasta, and rice have about 15 grams of carbs in a serving. A serving is 1 slice of bread (1 ounce), ½ cup of cooked cereal, or 1/3 cup of cooked pasta or rice. ? Fruits have 15 grams of carbs in a serving. A serving is 1 small fresh fruit, such as an apple or orange; ½ of a banana; ½ cup of cooked or canned fruit; ½ cup of fruit juice; 1 cup of melon or raspberries; or 2 tablespoons of dried fruit. ? Milk and no-sugar-added yogurt have 15 grams of carbs in a serving. A serving is 1 cup of milk or 2/3 cup of no-sugar-added yogurt. ? Starchy vegetables have 15 grams of carbs in a serving. A serving is ½ cup of mashed potatoes or sweet potato; 1 cup winter squash; ½ of a small baked potato; ½ cup of cooked beans; or ½ cup cooked corn or green peas. · Learn how much carbs to eat each day and at each meal. A dietitian or CDE can teach you how to keep track of the amount of carbs you eat. This is called carbohydrate counting. · If you are not sure how to count carbohydrate grams, use the Plate Method to plan meals. It is a good, quick way to make sure that you have a balanced meal. It also helps you spread carbs throughout the day. ? Divide your plate by types of foods.  Put non-starchy vegetables on half the plate, meat or other protein food on one-quarter of the plate, and a grain or starchy vegetable in the final quarter of the plate. To this you can add a small piece of fruit and 1 cup of milk or yogurt, depending on how many carbs you are supposed to eat at a meal. 
· Try to eat about the same amount of carbs at each meal. Do not \"save up\" your daily allowance of carbs to eat at one meal. 
· Proteins have very little or no carbs per serving. Examples of proteins are beef, chicken, turkey, fish, eggs, tofu, cheese, cottage cheese, and peanut butter. A serving size of meat is 3 ounces, which is about the size of a deck of cards. Examples of meat substitute serving sizes (equal to 1 ounce of meat) are 1/4 cup of cottage cheese, 1 egg, 1 tablespoon of peanut butter, and ½ cup of tofu. How can you eat out and still eat healthy? · Learn to estimate the serving sizes of foods that have carbohydrate. If you measure food at home, it will be easier to estimate the amount in a serving of restaurant food. · If the meal you order has too much carbohydrate (such as potatoes, corn, or baked beans), ask to have a low-carbohydrate food instead. Ask for a salad or green vegetables. · If you use insulin, check your blood sugar before and after eating out to help you plan how much to eat in the future. · If you eat more carbohydrate at a meal than you had planned, take a walk or do other exercise. This will help lower your blood sugar. What else should you know? · Limit saturated fat, such as the fat from meat and dairy products. This is a healthy choice because people who have diabetes are at higher risk of heart disease. So choose lean cuts of meat and nonfat or low-fat dairy products. Use olive or canola oil instead of butter or shortening when cooking. · Don't skip meals. Your blood sugar may drop too low if you skip meals and take insulin or certain medicines for diabetes. · Check with your doctor before you drink alcohol.  Alcohol can cause your blood sugar to drop too low. Alcohol can also cause a bad reaction if you take certain diabetes medicines. Follow-up care is a key part of your treatment and safety. Be sure to make and go to all appointments, and call your doctor if you are having problems. It's also a good idea to know your test results and keep a list of the medicines you take. Where can you learn more? Go to http://sheila-mario.info/. Enter S569 in the search box to learn more about \"Learning About Diabetes Food Guidelines. \" Current as of: July 25, 2018 Content Version: 11.9 © 6888-0140 [employer]. Care instructions adapted under license by [employer] (which disclaims liability or warranty for this information). If you have questions about a medical condition or this instruction, always ask your healthcare professional. [address] any warranty or liability for your use of this information. Learning About Type 2 Diabetes What is type 2 diabetes? Insulin is a hormone that helps your body use sugar from your food as energy. Type 2 diabetes happens when your body can't use insulin the right way. Over time, the pancreas can't make enough insulin. If you don't have enough insulin, too much sugar stays in your blood. If you are overweight, get little or no exercise, or have type 2 diabetes in your family, you are more likely to have problems with the way insulin works in your body.  Americans, Hispanics, Native Americans,  Americans, and Pacific Islanders have a higher risk for type 2 diabetes. Type 2 diabetes can be prevented or delayed with a healthy lifestyle, which includes staying at a healthy weight, making smart food choices, and getting regular exercise. What can you expect with type 2 diabetes? April Llanos keep hearing about how important it is to keep your blood sugar within a target range.  That's because over time, high blood sugar can lead to serious problems. It can: 
· Harm your eyes, nerves, and kidneys. · Damage your blood vessels, leading to heart disease and stroke. · Reduce blood flow and cause nerve damage to parts of your body, especially your feet. This can cause slow healing and pain when you walk. · Make your immune system weak and less able to fight infections. When people hear the word \"diabetes,\" they often think of problems like these. But daily care and treatment can help prevent or delay these problems. The goal is to keep your blood sugar in a target range. That's the best way to reduce your chance of having more problems from diabetes. What are the symptoms? Some people who have type 2 diabetes may not have any symptoms early on. Many people with the disease don't even know they have it at first. But with time, diabetes starts to cause symptoms. You experience most symptoms of type 2 diabetes when your blood sugar is either too high or too low. The most common symptoms of high blood sugar include: · Thirst. 
· Frequent urination. · Weight loss. · Blurry vision. The symptoms of low blood sugar include: · Sweating. · Shakiness. · Weakness. · Hunger. · Confusion. How can you prevent type 2 diabetes? The best way to prevent or delay type 2 diabetes is to adopt healthy habits, which include: 
· Staying at a healthy weight. · Exercising regularly. · Eating healthy foods. How is type 2 diabetes treated? If you have type 2 diabetes, here are the most important things you can do. · Take your diabetes medicines. · Check your blood sugar as often as your doctor recommends. Also, get a hemoglobin A1c test at least every 6 months. · Try to eat a variety of foods and to spread carbohydrate throughout the day. Carbohydrate raises blood sugar higher and more quickly than any other nutrient does. Carbohydrate is found in sugar, breads and cereals, fruit, starchy vegetables such as potatoes and corn, and milk and yogurt. · Get at least 30 minutes of exercise on most days of the week. Walking is a good choice. You also may want to do other activities, such as running, swimming, cycling, or playing tennis or team sports. If your doctor says it's okay, do muscle-strengthening exercises at least 2 times a week. · See your doctor for checkups and tests on a regular schedule. · If you have high blood pressure or high cholesterol, take the medicines as prescribed by your doctor. · Do not smoke. Smoking can make health problems worse. This includes problems you might have with type 2 diabetes. If you need help quitting, talk to your doctor about stop-smoking programs and medicines. These can increase your chances of quitting for good. Follow-up care is a key part of your treatment and safety. Be sure to make and go to all appointments, and call your doctor if you are having problems. It's also a good idea to know your test results and keep a list of the medicines you take. Where can you learn more? Go to http://sheila-mario.info/. Enter O895 in the search box to learn more about \"Learning About Type 2 Diabetes. \" Current as of: July 25, 2018 Content Version: 11.9 © 4641-6372 [employer]. Care instructions adapted under license by [employer] (which disclaims liability or warranty for this information). If you have questions about a medical condition or this instruction, always ask your healthcare professional. [place] [address] any warranty or liability for your use of this information.

## 2019-03-19 ENCOUNTER — HOSPITAL ENCOUNTER (OUTPATIENT)
Dept: MRI IMAGING | Age: 59
Discharge: HOME | End: 2019-03-19
Attending: ORTHOPAEDIC SURGERY
Payer: MEDICARE

## 2019-03-19 DIAGNOSIS — M54.32 BILATERAL SCIATICA: ICD-10-CM

## 2019-03-19 DIAGNOSIS — M54.50 LOW BACK PAIN: ICD-10-CM

## 2019-03-19 DIAGNOSIS — M54.31 BILATERAL SCIATICA: ICD-10-CM

## 2019-03-19 DIAGNOSIS — M47.817 LUMBOSACRAL SPONDYLOSIS WITHOUT MYELOPATHY: ICD-10-CM

## 2019-03-19 PROCEDURE — 72148 MRI LUMBAR SPINE W/O DYE: CPT

## 2019-04-10 DIAGNOSIS — N40.1 BENIGN LOCALIZED PROSTATIC HYPERPLASIA WITH LOWER URINARY TRACT SYMPTOMS (LUTS): ICD-10-CM

## 2019-04-10 RX ORDER — TAMSULOSIN HYDROCHLORIDE 0.4 MG/1
0.4 CAPSULE ORAL DAILY
Qty: 30 CAP | Refills: 2 | Status: CANCELLED | OUTPATIENT
Start: 2019-04-10

## 2019-04-10 RX ORDER — TAMSULOSIN HYDROCHLORIDE 0.4 MG/1
0.4 CAPSULE ORAL DAILY
Qty: 90 CAP | Refills: 1 | Status: SHIPPED | OUTPATIENT
Start: 2019-04-10 | End: 2019-05-22 | Stop reason: SDUPTHER

## 2019-04-10 NOTE — TELEPHONE ENCOUNTER
Pt would like a refill for:   tamsulosin (FLOMAX) 0.4 mg capsule     [facility] mail order       Best number to reach him is [phone]

## 2019-05-13 NOTE — PERIOP NOTES
[facility] Ambulatory Surgery Unit Pre-operative Instructions Procedure Date  05/14/19            Tentative Arrival Time 0565 1. On the day of your procedure, please report to the Ambulatory Surgery Unit Registration Desk and sign in at your designated time. The Ambulatory Surgery Unit is located in [facility] on the [facility] side of the [facility] [place] across from the [facility] building. Please have all of your health insurance cards and a photo ID. 2. You must have someone with you to drive you home as directed by your surgeon. 3. You may have a light breakfast and take normal morning medications. 4. We recommend you do not drink any alcoholic beverages for 24 hours before and after your procedure. 5. Contact your surgeons office for instructions on the following medications: non-steroidal anti-inflammatory drugs (i.e. Advil, Aleve), vitamins, and supplements. (Some surgeons will want you to stop these medications prior to surgery and others may allow you to take them) **If you are currently taking Plavix, Coumadin, Aspirin and/or other blood-thinning agents, contact your surgeon for instructions. ** Your surgeon will partner with the physician prescribing these medications to determine if it is safe to stop or if you need to continue taking. Please do not stop taking these medications without instructions from your surgeon. 6. In an effort to help prevent surgical site infection, we ask that you shower with an anti-bacterial soap (i.e. Dial or Safeguard) on the morning of your procedure. Do not apply any lotions, powders, or deodorants after showering. 7. Wear comfortable clothes. Wear glasses instead of contacts. Do not bring any jewelry or money (other than copays or fees as instructed). Do not wear make-up, particularly mascara, the morning of your procedure. Wear your hair loose or down, no ponytails, buns, miguel pins or clips.  All body piercings must be removed. 8. You should understand that if you do not follow these instructions your procedure may be cancelled. If your physical condition changes (i.e. fever, cold or flu) please contact your surgeon as soon as possible. 9. It is important that you be on time. If a situation occurs where you may be late, or if you have any questions or problems, please call [phone]. 
 
10. Your procedure time may be subject to change. You will receive a phone call the day prior to confirm your arrival time. I understand a pre-operative phone call will be made to verify my procedure time. In the event that I am not available, I give permission for a message to be left on my answering service and/or with another person? yes 
 
 
 
 ___________________      ___________________      ___________________ 
(Signature of Patient)          (Witness)                   (Date and Time)

## 2019-05-14 ENCOUNTER — HOSPITAL ENCOUNTER (OUTPATIENT)
Age: 59
Setting detail: OUTPATIENT SURGERY
Discharge: HOME | End: 2019-05-14
Attending: PHYSICAL MEDICINE & REHABILITATION | Admitting: PHYSICAL MEDICINE & REHABILITATION
Payer: MEDICARE

## 2019-05-14 ENCOUNTER — APPOINTMENT (OUTPATIENT)
Dept: GENERAL RADIOLOGY | Age: 59
End: 2019-05-14
Attending: PHYSICAL MEDICINE & REHABILITATION
Payer: MEDICARE

## 2019-05-14 VITALS
HEART RATE: 60 BPM | DIASTOLIC BLOOD PRESSURE: 91 MMHG | OXYGEN SATURATION: 97 % | HEIGHT: 71 IN | TEMPERATURE: 97.8 F | BODY MASS INDEX: 30.24 KG/M2 | WEIGHT: 216 LBS | SYSTOLIC BLOOD PRESSURE: 131 MMHG | RESPIRATION RATE: 16 BRPM

## 2019-05-14 PROCEDURE — 76030000002 HC AMB SURG OR TIME FIRST 0.: Performed by: PHYSICAL MEDICINE & REHABILITATION

## 2019-05-14 PROCEDURE — 76210000046 HC AMBSU PH II REC FIRST 0.5 HR: Performed by: PHYSICAL MEDICINE & REHABILITATION

## 2019-05-14 PROCEDURE — 74011250636 HC RX REV CODE- 250/636: Performed by: PHYSICAL MEDICINE & REHABILITATION

## 2019-05-14 PROCEDURE — 76000 FLUOROSCOPY <1 HR PHYS/QHP: CPT

## 2019-05-14 PROCEDURE — 72020 X-RAY EXAM OF SPINE 1 VIEW: CPT

## 2019-05-14 PROCEDURE — 74011000250 HC RX REV CODE- 250: Performed by: PHYSICAL MEDICINE & REHABILITATION

## 2019-05-14 PROCEDURE — 77030003665 HC NDL SPN BBMI -A: Performed by: PHYSICAL MEDICINE & REHABILITATION

## 2019-05-14 RX ORDER — BUPIVACAINE HYDROCHLORIDE 5 MG/ML
5 INJECTION, SOLUTION EPIDURAL; INTRACAUDAL ONCE
Status: COMPLETED | OUTPATIENT
Start: 2019-05-14 | End: 2019-05-14

## 2019-05-14 RX ORDER — LIDOCAINE HYDROCHLORIDE 20 MG/ML
10 INJECTION, SOLUTION INFILTRATION; PERINEURAL ONCE
Status: COMPLETED | OUTPATIENT
Start: 2019-05-14 | End: 2019-05-14

## 2019-05-14 RX ORDER — LORAZEPAM 1 MG/1
1 TABLET ORAL
COMMUNITY
End: 2019-05-22

## 2019-05-14 RX ORDER — METHYLPREDNISOLONE ACETATE 40 MG/ML
40 INJECTION, SUSPENSION INTRA-ARTICULAR; INTRALESIONAL; INTRAMUSCULAR; SOFT TISSUE ONCE
Status: COMPLETED | OUTPATIENT
Start: 2019-05-14 | End: 2019-05-14

## 2019-05-14 NOTE — OP NOTES
SI Steroid Injection Operative Report    Indications: This is a 60 y.o. male who presents with LBP. He was positive for Sacroiliitis. The patient was admitted for surgery as conservative measures have failed. Date of Surgery: 5/14/2019    Preoperative Diagnosis: Sacroiliitis    Postoperative Diagnosis: Sacroiliitis    Surgeon(s) and Role:     * Kemi Hooper MD - Primary     Procedure:  Procedure(s):  BILATERAL SI JOINT INJECTION    Procedure in Detail:  After appropriate informed consent was obtained, the patient was taken to the operating suite and placed in the prone position on the operating table on appropriate padding. The LS region was prepped and draped in the usual sterile fashion. Intraoperative fluoroscopy was used to localize the LS spine. The skin was infiltrated with 2% lidocaine. An 22-g needle was advanced into the Constantino SI joints under fluoroscopic guidance. Next, 2ml of 0.5% marcaine and 80mg of Depo-Medrol were injected. The needle was removed from the patient. The patient was then turned back into the supine position on the stretcher and was taken to the Recovery Room in stable condition.     Estimated Blood Loss:  none     Specimens: None       Drains: None          Complications:  None    Signed By: Hansa Cohen MD                        May 14, 2019

## 2019-05-14 NOTE — DISCHARGE INSTRUCTIONS
Dr. Mata Edward Discharge Instructions    You had a sacroiliac joint injection today. The steroids will slowly become effective, reducing your pain, over the next 2 weeks. You should begin feeling better after a few days, but it may take up to 2 weeks to notice the difference. The benefit you get from your injection will last a variable amount of time, depending on the severity of your spine problem. You may need a series of injections, up to three (3) per year, depending on your response to this one.  PAIN: Most people do not have any increase in pain after this injection. Hoever, you might experience some soreness in your low back. If this happens, putting an ice pack over the sore area will help,   Bandage: You will have a small bandage covering the site of the injection. You may remove it once you get home.  Restrictions: Someone should drive you home after the injection. After that, you have no restrictions. You may resume your normal level of activity. You may take a shower or bath, and you may eat normally. You should continue your current exercises and /or therapy routine.  Medications: Continue your current medications as prescribed. If your pain decreases, you may reduce the amount of your pain medicines. If you stopped taking anticoagulants or blood-thinners before the injection, start them tomorrow. If you have diabetes, your blood sugar may be elevated for a few days. Call your primary doctor with any questions.   Call Dr. Sheila Bourgeois at [phone] if you experience:   Fever (101 degrees Fahrenheit or greater)   Nausea or vomiting   Headache unrelieved by your normal pain medicine   Redness or swelling at the injection site that lasts more than 1 day   New numbness, tingling, weakness, or pain that you didnt have before the injection  Follow- up appointment: 2-4 weeks      If still having pain in 1-2 weeks, call office at [phone] for a follow up appointment. DISCHARGE SUMMARY from Nurse    The following personal items collected during your admission are returned to you:   Dental Appliance: Dental Appliances: None  Vision:    Hearing Aid:    Jewelry: Jewelry: None  Clothing: Clothing: With patient  Other Valuables: Other Valuables: None  Valuables sent to safe: If you were given prescriptions, please review the written information on prescribed medications. · You will receive a Post Operative Call from one of the Recovery Room Nurses on the day after your surgery to check on you. It is very important for us to know how you are recovering after your surgery. · You may receive an e-mail or letter in the mail from [facility] regarding your experience with us in the Ambulatory Surgery Unit. Your feedback is valuable to us and we appreciate your participation in the survey. If you have not had your influenza or pneumococcal vaccines, please follow up with your primary care physician. The discharge information has been reviewed with the patient. The patient verbalized understanding.

## 2019-05-14 NOTE — H&P
Procedural Case Note 5/14/2019    (1:49 PM) Renan Storey. 
 
1960   (60 y.o.) 
 
[id number] CC:  pain ROS:   Complete ROS obtained, no CP, no SOB, no N or V 
 
PMH:    
Past Medical History:  
Diagnosis Date  Chronic pain   
 back  Colitis 12/3/2018  Degenerative disc disease, lumbar  Essential hypertension 12/3/2018  GERD (gastroesophageal reflux disease)   
 hiatal hernia  H/O hernia repair 12/11/2018  History of cholecystectomy 12/11/2018  Ill-defined condition   
 ruptured gall bladder  Osteoarthritis of spine with radiculopathy, lumbar region 12/11/2018  Smoker 12/11/2018 ALLERGIES:    
Allergies Allergen Reactions  Aspirin Other (comments) Nose bleeds  Morphine Other (comments) Agitation MEDS:    
No current facility-administered medications for this encounter. Visit Vitals /75 (BP 1 Location: Right arm, BP Patient Position: At rest) Pulse 62 Temp 97.8 °F (36.6 °C) Resp 18 Ht 5' 11\" (1.803 m) Wt 98 kg (216 lb) SpO2 96% BMI 30.13 kg/m² PE: 
Gen: NAD Head: normocephalic Heart: RRR Lungs: CTA arlette Abd: NT, ND, soft Neuro: awake and alert Skin: intact IMPRESSION:   Sacroiliitis Note:  The clinical status was discussed in detail with the patient. The procedure was again discussed and described in detail. All understand and accept the planned procedure and risks; reject other forms of treatment. All questions are answered.  
 
Valerie Mcdonald MD

## 2019-05-22 ENCOUNTER — HOSPITAL ENCOUNTER (OUTPATIENT)
Dept: LAB | Age: 59
Discharge: HOME | End: 2019-05-22
Payer: MEDICARE

## 2019-05-22 ENCOUNTER — OFFICE VISIT (OUTPATIENT)
Dept: FAMILY MEDICINE CLINIC | Age: 59
End: 2019-05-22

## 2019-05-22 VITALS
OXYGEN SATURATION: 94 % | RESPIRATION RATE: 18 BRPM | BODY MASS INDEX: 30.15 KG/M2 | HEIGHT: 71 IN | HEART RATE: 50 BPM | DIASTOLIC BLOOD PRESSURE: 77 MMHG | WEIGHT: 215.4 LBS | SYSTOLIC BLOOD PRESSURE: 110 MMHG | TEMPERATURE: 96 F

## 2019-05-22 DIAGNOSIS — Z91.89 AT RISK FOR HEART DISEASE: ICD-10-CM

## 2019-05-22 DIAGNOSIS — E78.2 MIXED HYPERLIPIDEMIA: ICD-10-CM

## 2019-05-22 DIAGNOSIS — F17.200 SMOKER: ICD-10-CM

## 2019-05-22 DIAGNOSIS — R73.03 PREDIABETES: ICD-10-CM

## 2019-05-22 DIAGNOSIS — Z79.899 ENCOUNTER FOR LONG-TERM (CURRENT) USE OF MEDICATIONS: ICD-10-CM

## 2019-05-22 DIAGNOSIS — I10 ESSENTIAL HYPERTENSION: Primary | ICD-10-CM

## 2019-05-22 DIAGNOSIS — N40.1 BENIGN LOCALIZED PROSTATIC HYPERPLASIA WITH LOWER URINARY TRACT SYMPTOMS (LUTS): ICD-10-CM

## 2019-05-22 PROCEDURE — 83036 HEMOGLOBIN GLYCOSYLATED A1C: CPT

## 2019-05-22 PROCEDURE — 80053 COMPREHEN METABOLIC PANEL: CPT

## 2019-05-22 PROCEDURE — 36415 COLL VENOUS BLD VENIPUNCTURE: CPT

## 2019-05-22 RX ORDER — METOPROLOL SUCCINATE 25 MG/1
25 TABLET, EXTENDED RELEASE ORAL DAILY
Qty: 90 TAB | Refills: 1 | Status: CANCELLED | OUTPATIENT
Start: 2019-05-22

## 2019-05-22 RX ORDER — AMLODIPINE BESYLATE 5 MG/1
5 TABLET ORAL DAILY
Qty: 90 TAB | Refills: 1 | Status: SHIPPED | OUTPATIENT
Start: 2019-05-22 | End: 2019-10-08 | Stop reason: SDUPTHER

## 2019-05-22 RX ORDER — TAMSULOSIN HYDROCHLORIDE 0.4 MG/1
0.4 CAPSULE ORAL DAILY
Qty: 90 CAP | Refills: 1 | Status: SHIPPED | OUTPATIENT
Start: 2019-05-22 | End: 2019-10-28 | Stop reason: SDUPTHER

## 2019-05-22 RX ORDER — ATORVASTATIN CALCIUM 10 MG/1
10 TABLET, FILM COATED ORAL DAILY
Qty: 90 TAB | Refills: 1 | Status: SHIPPED | OUTPATIENT
Start: 2019-05-22 | End: 2019-10-08 | Stop reason: SDUPTHER

## 2019-05-22 NOTE — PROGRESS NOTES
Chief Complaint   Patient presents with    Hypertension    Cholesterol Problem     3 mo check    1. Have you been to the ER, urgent care clinic since your last visit? Hospitalized since your last visit? no    2. Have you seen or consulted any other health care providers outside of the [address] [place] [address] since your last visit? Include any pap smears or colon screening.  yes

## 2019-05-22 NOTE — PROGRESS NOTES
Connie Holbrook. is a 60 y.o. male     has a past medical history of Chronic pain, Colitis (12/3/2018), Degenerative disc disease, lumbar, Essential hypertension (12/3/2018), GERD (gastroesophageal reflux disease), H/O hernia repair (12/11/2018), History of cholecystectomy (12/11/2018), Ill-defined condition, Osteoarthritis of spine with radiculopathy, lumbar region (12/11/2018), and Smoker (12/11/2018). HTN: BP at goal today. Metoprolol causes lenny to 50 bpm.  He denies dizziness, light headed, syncope. We'll d/c metoprolol and   Restart amlodipine     HLD: Trigly 175 and . Hx of HTN, Smoker, age 60, Male and also borderline diabetes. We'll start him on Lipitor 10mg. LUTS: Nocturia, weak urine stream. PSA is 0.6, WNL. Last visit we started Flomax 0.4mg. It has helped with his urine, no longer waking up 4-5X at night. Prediabetes: discussed pathophysiology and risk, complication. For now he want to try with diet and weight loss, we'll recheck in 3 months. He denies CP, SOB. Its been more than a year he's been more tired, due to multiple things, his back hurt, no longer able to do physical exerting activities. Ref cardiologist.     Smoker. 20 packs years. Discuss cessation. BMI 30: Discussed the patient's BMI with him. The BMI follow up plan is as follows:   dietary management education, guidance, and counseling encourage exercise monitor weight prescribed dietary intake    Have seen 2 orthopedics, have had 2 more injection to his back since our last visit. I've started him on Lyrica, and ortho have raised to to 100mg and 150mg and it didn't help, he stopped taking it 1 week ago. Lumbar DJD hx. Estil Brianna about 30 injection in his spines\". But have headache afterward. Was supposed to have surgery 6-7 years ago. Sciatica on right side. Denies loss of bowel or bladder, saddle anesthesia or other redflags.    Have been taking ibuprofen 1200-1600mg daily for the past 2 weeks b/c of back pain and muscle spasm. Says he have tried gabapentin and it didn't help, and he doesn't want to bother with it. Last visit prednisone course didn't help. Report was on gabapentin before. Colonoscopy 12/2018 showed 2 polyps, next due 5 years.      Smoker 1ppd X 40 years     Allergies to aspirin    Reviewed: active problem list, medication list, allergies, notes from last encounter    A comprehensive review of systems was negative except for that written in the HPI. Allergies   Allergen Reactions    Aspirin Other (comments)     Nose bleeds    Morphine Other (comments)     Agitation     Current Outpatient Medications on File Prior to Visit   Medication Sig Dispense Refill    ibuprofen (MOTRIN) 200 mg tablet Take 600 mg by mouth every eight (8) hours as needed for Pain.  pregabalin (LYRICA) 75 mg capsule Take 1 Cap by mouth two (2) times a day. Max Daily Amount: 150 mg. (Patient taking differently: Take 150 mg by mouth two (2) times a day.) 60 Cap 1     No current facility-administered medications on file prior to visit. Patient Active Problem List   Diagnosis Code    Colitis K52.9    Essential hypertension I10    Osteoarthritis of spine with radiculopathy, lumbar region M47.26    Smoker F17.200    History of cholecystectomy Z90.49    H/O hernia repair Z98.890, Z87.19       Visit Vitals  /77   Pulse (!) 50   Temp 96 °F (35.6 °C) (Oral)   Resp 18   Ht 5' 11\" (1.803 m)   Wt 215 lb 6.4 oz (97.7 kg)   SpO2 94%   BMI 30.04 kg/m²     General appearance: alert, cooperative, no distress, appears older than stated age  Neurologic: Alert and oriented X 3, normal strength and tone, symmetric. Normal without focal findings. Cranial nerves 2-12 intact. Normal coordination and gait. Mental status: Alert, oriented, thought content appropriate, affect: stable, mood-congruent. Head: Normocephalic, without obvious abnormality, atraumatic  Eyes: conjunctivae/corneas clear. PERRL, EOM's intact. Neck: supple, symmetrical, trachea midline, no JVD  Lungs: clear to auscultation bilaterally  Heart: regular rate and rhythm, S1, S2 normal, no murmur, click, rub or gallop  Abdomen: soft, non-tender. Extremities: extremities normal, atraumatic, no cyanosis or edema      Assessment/Plans:    Diagnoses and all orders for this visit:    1. Essential hypertension  -     amLODIPine (NORVASC) 5 mg tablet; Take 1 Tab by mouth daily.  -     METABOLIC PANEL, COMPREHENSIVE    2. Benign localized prostatic hyperplasia with lower urinary tract symptoms (LUTS)  -     tamsulosin (FLOMAX) 0.4 mg capsule; Take 1 Cap by mouth daily. 3. Mixed hyperlipidemia  -     atorvastatin (LIPITOR) 10 mg tablet; Take 1 Tab by mouth daily.  -     METABOLIC PANEL, COMPREHENSIVE    4. Prediabetes  -     HEMOGLOBIN A1C W/O EAG    5. Encounter for long-term (current) use of medications  -     METABOLIC PANEL, COMPREHENSIVE  -     HEMOGLOBIN A1C W/O EAG    6. At risk for heart disease  -     REFERRAL TO CARDIOLOGY    7. Smoker      Discussed plans, risk/benefits of treatments/observations. Through the use of shared decision making, above plans were agreed upon. Medication compliance advised. Patient verbalized understanding.      Follow-up and Dispositions    · Return in about 3 months (around 8/22/2019) for HTN, DM2, sooner if labs abnormal.           Neto Mart MD  5/22/2019

## 2019-05-23 LAB
ALBUMIN SERPL-MCNC: 4.5 G/DL (ref 3.6–4.8)
ALBUMIN/GLOB SERPL: 1.7 {RATIO} (ref 1.2–2.2)
ALP SERPL-CCNC: 73 IU/L (ref 39–117)
ALT SERPL-CCNC: 19 IU/L (ref 0–44)
AST SERPL-CCNC: 16 IU/L (ref 0–40)
BILIRUB SERPL-MCNC: 0.5 MG/DL (ref 0–1.2)
BUN SERPL-MCNC: 19 MG/DL (ref 8–27)
BUN/CREAT SERPL: 20 (ref 10–24)
CALCIUM SERPL-MCNC: 9.4 MG/DL (ref 8.6–10.2)
CHLORIDE SERPL-SCNC: 104 MMOL/L (ref 96–106)
CO2 SERPL-SCNC: 23 MMOL/L (ref 20–29)
CREAT SERPL-MCNC: 0.93 MG/DL (ref 0.76–1.27)
GLOBULIN SER CALC-MCNC: 2.6 G/DL (ref 1.5–4.5)
GLUCOSE SERPL-MCNC: 107 MG/DL (ref 65–99)
HBA1C MFR BLD: 6.2 % (ref 4.8–5.6)
POTASSIUM SERPL-SCNC: 4.3 MMOL/L (ref 3.5–5.2)
PROT SERPL-MCNC: 7.1 G/DL (ref 6–8.5)
SODIUM SERPL-SCNC: 142 MMOL/L (ref 134–144)

## 2019-06-18 ENCOUNTER — OFFICE VISIT (OUTPATIENT)
Dept: CARDIOLOGY CLINIC | Age: 59
End: 2019-06-18

## 2019-06-18 VITALS
SYSTOLIC BLOOD PRESSURE: 100 MMHG | WEIGHT: 216.2 LBS | DIASTOLIC BLOOD PRESSURE: 72 MMHG | HEIGHT: 71 IN | RESPIRATION RATE: 18 BRPM | HEART RATE: 77 BPM | BODY MASS INDEX: 30.27 KG/M2 | OXYGEN SATURATION: 93 %

## 2019-06-18 DIAGNOSIS — Z72.0 TOBACCO USE: ICD-10-CM

## 2019-06-18 DIAGNOSIS — E78.5 DYSLIPIDEMIA: ICD-10-CM

## 2019-06-18 DIAGNOSIS — R00.1 BRADYCARDIA: Primary | ICD-10-CM

## 2019-06-18 DIAGNOSIS — I10 ESSENTIAL HYPERTENSION: ICD-10-CM

## 2019-06-18 DIAGNOSIS — R73.03 BORDERLINE DIABETES: ICD-10-CM

## 2019-06-18 RX ORDER — TIZANIDINE 4 MG/1
4 TABLET ORAL
COMMUNITY
Start: 2019-06-10 | End: 2019-06-20

## 2019-06-18 NOTE — PROGRESS NOTES
1. Have you been to the ER, urgent care clinic since your last visit? Hospitalized since your last visit? No.    2. Have you seen or consulted any other health care providers outside of the [address] [place] [address] since your last visit? Include any pap smears or colon screening.    No.      Chief Complaint   Patient presents with    New Patient     referred by PCP for bradycardia, had episodes of fainting and bradycardia after PCP started him on metoprolol-since changes he feels better

## 2019-06-18 NOTE — PROGRESS NOTES
[address] [place] [address] [address], [place], [address]     Vitor Recinos is a 60 y.o. male is new to me, referred for evaluation of bradycardia by Ganesh Noyola MD.     Subjective:     Mr. Adonay Lakhani presents today for evaluation of low heart rate and dizziness. He was recently taken off Metoprolol 50 mg and switched to Amlodipine 5 mg by his PCP Ganesh Noyola MD. He states he has not had any episode of dizziness since this medication change. He monitors BP at home and reports average systolic . Cardiac risk factors include HTN, borderline diabetes, hyperlipidemia, and tobacco use. Patient was started on Lipitor 10 mg daily recently. He smokes 0.5 PPD and states he is not motivated to quit. Patient denies any exertional chest pain, dyspnea, palpitations, syncope, orthopnea, edema or paroxysmal nocturnal dyspnea.       Patient Active Problem List    Diagnosis Date Noted    Osteoarthritis of spine with radiculopathy, lumbar region 12/11/2018    Smoker 12/11/2018    History of cholecystectomy 12/11/2018    H/O hernia repair 12/11/2018    Colitis 12/03/2018    Essential hypertension 12/03/2018      Ganesh Noyola MD  Past Medical History:   Diagnosis Date    Chronic pain     back    Colitis 12/3/2018    Degenerative disc disease, lumbar     Essential hypertension 12/3/2018    GERD (gastroesophageal reflux disease)     hiatal hernia    H/O hernia repair 12/11/2018    History of cholecystectomy 12/11/2018    Ill-defined condition     ruptured gall bladder    Osteoarthritis of spine with radiculopathy, lumbar region 12/11/2018    Smoker 12/11/2018      Past Surgical History:   Procedure Laterality Date    COLONOSCOPY N/A 12/18/2018    COLONOSCOPY performed by Alex Alva MD at [address] [place] [address]  12/18/2018         HX COLONOSCOPY      HX GI      galbladder removal    HX ORTHOPAEDIC      lt shoulder, muscle and skin graft    HX ORTHOPAEDIC      rt shoulder, anchors in place    HX ORTHOPAEDIC      rt foot, removed bone spur by great toe    HX ORTHOPAEDIC      rt hand, removed infected and cyst on wrist    HX OTHER SURGICAL      incisional hernia     Allergies   Allergen Reactions    Aspirin Other (comments)     Nose bleeds    Morphine Other (comments)     Agitation      Family History   Problem Relation Age of Onset    Cancer Mother     Cancer Father       Social History     Socioeconomic History    Marital status:      Spouse name: Not on file    Number of children: Not on file    Years of education: Not on file    Highest education level: Not on file   Occupational History    Not on file   Social Needs    Financial resource strain: Not on file    Food insecurity:     Worry: Not on file     Inability: Not on file    Transportation needs:     Medical: Not on file     Non-medical: Not on file   Tobacco Use    Smoking status: Current Every Day Smoker     Packs/day: 0.50     Years: 40.00     Pack years: 20.00     Types: Cigarettes    Smokeless tobacco: Never Used   Substance and Sexual Activity    Alcohol use: Yes     Comment: rarely    Drug use: Yes     Types: Marijuana     Comment: once weekly    Sexual activity: Yes     Partners: Female   Lifestyle    Physical activity:     Days per week: Not on file     Minutes per session: Not on file    Stress: Not on file   Relationships    Social connections:     Talks on phone: Not on file     Gets together: Not on file     Attends Zoroastrianism service: Not on file     Active member of club or organization: Not on file     Attends meetings of clubs or organizations: Not on file     Relationship status: Not on file    Intimate partner violence:     Fear of current or ex partner: Not on file     Emotionally abused: Not on file     Physically abused: Not on file     Forced sexual activity: Not on file   Other Topics Concern    Not on file   Social History Narrative    Not on file      Current Outpatient Medications   Medication Sig    tiZANidine (ZANAFLEX) 4 mg tablet Take 4 mg by mouth every six (6) hours as needed.  tamsulosin (FLOMAX) 0.4 mg capsule Take 1 Cap by mouth daily.  atorvastatin (LIPITOR) 10 mg tablet Take 1 Tab by mouth daily.  amLODIPine (NORVASC) 5 mg tablet Take 1 Tab by mouth daily.  ibuprofen (MOTRIN) 200 mg tablet Take 600 mg by mouth every eight (8) hours as needed for Pain.  pregabalin (LYRICA) 75 mg capsule Take 1 Cap by mouth two (2) times a day. Max Daily Amount: 150 mg. (Patient taking differently: Take 150 mg by mouth two (2) times a day.)     No current facility-administered medications for this visit. Review of Systems  Constitutional: Negative for fever, chills, malaise/fatigue and diaphoresis. Respiratory: Negative for cough, hemoptysis, sputum production, shortness of breath and wheezing. Cardiovascular: Negative for chest pain, palpitations, orthopnea, claudication, leg swelling and PND. Gastrointestinal: Negative for heartburn, nausea, vomiting, blood in stool and melena. Genitourinary: Negative for dysuria and flank pain. Musculoskeletal: Negative for joint pain and back pain. Skin: Negative for rash. Neurological: Negative for focal weakness, seizures, loss of consciousness, weakness and headaches. Endo/Heme/Allergies: Does not bruise/bleed easily. Psychiatric/Behavioral: Negative for memory loss. The patient does not have insomnia. Physical Exam:    Visit Vitals  /72 (BP 1 Location: Left arm, BP Patient Position: Sitting)   Pulse 77   Resp 18   Ht 5' 11\" (1.803 m)   Wt 216 lb 3.2 oz (98.1 kg)   SpO2 93%   BMI 30.15 kg/m²     Wt Readings from Last 3 Encounters:   06/18/19 216 lb 3.2 oz (98.1 kg)   05/22/19 215 lb 6.4 oz (97.7 kg)   05/14/19 216 lb (98 kg)       Gen: NAD    Mental Status - Alert. General Appearance - Not in acute distress.      Neck - no JVD    Chest and Lung Exam   Inspection: Accessory muscles - No use of accessory muscles in breathing. Auscultation:   Breath sounds: - Normal.     Cardiovascular   Inspection: Jugular vein - Bilateral - Inspection Normal.   Palpation/Percussion:   Apical Impulse: - Normal.   Auscultation: Rhythm - Regular. Heart Sounds - S1 WNL and S2 WNL. No S3 or S4. Murmurs & Other Heart Sounds: Auscultation of the heart reveals - No Murmurs. Peripheral Vascular   Upper Extremity: Inspection - Bilateral - No Cyanotic nailbeds or Digital clubbing. Lower Extremity:   Palpation: Edema - Bilateral - No edema. Abdomen: Soft, non-tender, bowel sounds are active. Neuro: A&O times 3, CN and motor grossly WNL    Cardiographics  EKG 6/18/19 - normal SR with rate 60     Assessment:     Encounter Diagnoses   Name Primary?  Bradycardia Yes    Essential hypertension     Dyslipidemia     Borderline diabetes     Tobacco use           Plan:     Mr. Annette Workamn' bradycardia and dizziness have resolved since he was taken off Metoprolol 50 mg and switched to Amlodipine 5 mg by his PCP Adelso Aldana MD. BP today is fine. EKG is good. Heart rate is normal. He does have significant cardiac risk factors to include HTN, borderline diabetes, hyperlipidemia, and tobacco use. He was started on Lipitor 10 mg daily recently by Adelso Aldana MD. He smokes 0.5 PPD and is not motivated to quit. Follow up as needed.     Written by Earlene Bedoya, as dictated by Porter Corrigan MD.

## 2019-06-28 RX ORDER — AMOXICILLIN 250 MG/1
250 CAPSULE ORAL 4 TIMES DAILY
COMMUNITY
End: 2019-12-03 | Stop reason: ALTCHOICE

## 2019-06-28 NOTE — PERIOP NOTES
[facility]  Ambulatory Surgery Unit  Pre-operative Instructions    Procedure Date  7/9/19            Tentative Arrival Time 7540      1. On the day of your procedure, please report to the Ambulatory Surgery Unit Registration Desk and sign in at your designated time. The Ambulatory Surgery Unit is located in [facility] on the [facility] side of the [facility] across from the [address] [place] [address]. Please have all of your health insurance cards and a photo ID. 2. You must have someone with you to drive you home as directed by your surgeon. 3. You may have a light breakfast and take normal morning medications. 4. We recommend you do not drink any alcoholic beverages for 24 hours before and after your procedure. 5. Contact your surgeons office for instructions on the following medications: non-steroidal anti-inflammatory drugs (i.e. Advil, Aleve), vitamins, and supplements. (Some surgeons will want you to stop these medications prior to surgery and others may allow you to take them)   **If you are currently taking Plavix, Coumadin, Aspirin and/or other blood-thinning agents, contact your surgeon for instructions. ** Your surgeon will partner with the physician prescribing these medications to determine if it is safe to stop or if you need to continue taking. Please do not stop taking these medications without instructions from your surgeon. 6. In an effort to help prevent surgical site infection, we ask that you shower with an anti-bacterial soap (i.e. Dial or Safeguard) on the morning of your procedure. Do not apply any lotions, powders, or deodorants after showering. 7. Wear comfortable clothes. Wear glasses instead of contacts. Do not bring any jewelry or money (other than copays or fees as instructed). Do not wear make-up, particularly mascara, the morning of your procedure. Wear your hair loose or down, no ponytails, buns, miguel pins or clips.  All body piercings must be removed. 8. You should understand that if you do not follow these instructions your procedure may be cancelled. If your physical condition changes (i.e. fever, cold or flu) please contact your surgeon as soon as possible. 9. It is important that you be on time. If a situation occurs where you may be late, or if you have any questions or problems, please call [phone].    10. Your procedure time may be subject to change. You will receive a phone call the day prior to confirm your arrival time. I understand a pre-operative phone call will be made to verify my procedure time. In the event that I am not available, I give permission for a message to be left on my answering service and/or with another person?       YES    The above note was reviewed with patient during PAT phone call on 6/28/19, patient verbalized understanding.            ___________________      ___________________      ___________________  (Signature of Patient)          (Witness)                   (Date and Time)

## 2019-06-28 NOTE — PERIOP NOTES
Pt had oral surgery on 6/24/19, per pt today is the last day of his antibiotics and he feels he is healing nicely. If pt receives any additional treatment and/or delayed healing he agrees to contact PAT to inform.

## 2019-07-09 ENCOUNTER — APPOINTMENT (OUTPATIENT)
Dept: GENERAL RADIOLOGY | Age: 59
End: 2019-07-09
Attending: PHYSICAL MEDICINE & REHABILITATION
Payer: MEDICARE

## 2019-07-09 ENCOUNTER — HOSPITAL ENCOUNTER (OUTPATIENT)
Age: 59
Setting detail: OUTPATIENT SURGERY
Discharge: HOME | End: 2019-07-09
Attending: PHYSICAL MEDICINE & REHABILITATION | Admitting: PHYSICAL MEDICINE & REHABILITATION
Payer: MEDICARE

## 2019-07-09 VITALS
WEIGHT: 214 LBS | RESPIRATION RATE: 16 BRPM | HEIGHT: 72 IN | HEART RATE: 60 BPM | TEMPERATURE: 98.2 F | SYSTOLIC BLOOD PRESSURE: 129 MMHG | OXYGEN SATURATION: 91 % | BODY MASS INDEX: 28.99 KG/M2 | DIASTOLIC BLOOD PRESSURE: 87 MMHG

## 2019-07-09 PROCEDURE — 74011250636 HC RX REV CODE- 250/636: Performed by: PHYSICAL MEDICINE & REHABILITATION

## 2019-07-09 PROCEDURE — 76210000046 HC AMBSU PH II REC FIRST 0.5 HR: Performed by: PHYSICAL MEDICINE & REHABILITATION

## 2019-07-09 PROCEDURE — 77030003665 HC NDL SPN BBMI -A: Performed by: PHYSICAL MEDICINE & REHABILITATION

## 2019-07-09 PROCEDURE — 76000 FLUOROSCOPY <1 HR PHYS/QHP: CPT

## 2019-07-09 PROCEDURE — 76030000002 HC AMB SURG OR TIME FIRST 0.: Performed by: PHYSICAL MEDICINE & REHABILITATION

## 2019-07-09 PROCEDURE — 72020 X-RAY EXAM OF SPINE 1 VIEW: CPT

## 2019-07-09 RX ORDER — METHYLPREDNISOLONE ACETATE 40 MG/ML
40 INJECTION, SUSPENSION INTRA-ARTICULAR; INTRALESIONAL; INTRAMUSCULAR; SOFT TISSUE ONCE
Status: COMPLETED | OUTPATIENT
Start: 2019-07-09 | End: 2019-07-09

## 2019-07-09 RX ORDER — LIDOCAINE HYDROCHLORIDE 20 MG/ML
10 INJECTION, SOLUTION INFILTRATION; PERINEURAL ONCE
Status: COMPLETED | OUTPATIENT
Start: 2019-07-09 | End: 2019-07-09

## 2019-07-09 RX ORDER — BUPIVACAINE HYDROCHLORIDE 5 MG/ML
5 INJECTION, SOLUTION EPIDURAL; INTRACAUDAL ONCE
Status: DISCONTINUED | OUTPATIENT
Start: 2019-07-09 | End: 2019-07-09 | Stop reason: HOSPADM

## 2019-07-09 NOTE — PERIOP NOTES
Patient states that wife Ruddy Hoyt  will be with them for at least 24 hours following today's procedure.

## 2019-07-09 NOTE — PERIOP NOTES
[facility]  1960  [id number]    Situation:  Verbal report given from: JAMEEL Mahoney RN  Procedure: Procedure(s):  RIGHT L4/LEFT L5 TRANSFORAMINAL EPIDURAL STEROID INJECTION    Background:    Preoperative diagnosis: DEGENERATIVE DISC DISEASE    Postoperative diagnosis: DEGENERATIVE DISC DISEASE    :  Dr. Martinez Chapman:  Intra-procedure medications, procedure, and allergies reviewed        Recommendation:    Discharge patient home after discharge instructions reviewed with patient. Rest until local has worn off.

## 2019-07-09 NOTE — PERIOP NOTES
PATIENT DENIES PAIN WHEN ASSESSED BY DR. Lilian Ridley. PATIENT TOLERATING PROCEDURE WITHOUT COMPLAINT.

## 2019-07-09 NOTE — PERIOP NOTES
Permission received to review discharge instructions and discuss private health information with wife Bruce Mcfadden

## 2019-07-09 NOTE — OP NOTES
Epidural Steroid Injection Operative Report    Indications: This is a 60 y.o. male who presents with low back pain. He was positive for LS DDD. The patient was admitted for surgery as conservative measures have failed. Date of Surgery: 7/9/2019    Preoperative Diagnosis: LS DDD    Postoperative Diagnosis: LS DDD    Surgeon(s) and Role:     * Jeannie Stout MD - Primary     Procedure:  Procedure(s):  RIGHT L4/LEFT L5 TRANSFORAMINAL EPIDURAL STEROID INJECTION    Procedure in Detail:  After appropriate informed consent was obtained, the patient was taken to the operating suite and placed in the prone position on the operating table on appropriate padding. The LS region was prepped and draped in the usual sterile fashion. Intraoperative fluoroscopy was used to localize the LS spine. The skin was infiltrated with 2% lidocaine. An 22-g needle was advanced into the Right L4 and Left L5 neuroforamen under fluoroscopic guidance. A small amount of contrast was injected into the epidural space, confirming appropriate needle placement on fluoroscopy. Next, 2ml of 2% lidocaine and 80mg of Depo-Medrol were injected. The needle was removed from the patient. The patient was then turned back into the supine position on the stretcher and was taken to the Recovery Room in stable condition.     Estimated Blood Loss:  none     Specimens: None       Drains: None          Complications:  None    Signed By: Bibi Montejo MD                        July 9, 2019

## 2019-07-09 NOTE — DISCHARGE INSTRUCTIONS
Dr. Mike Cloud Discharge Instructions  Transforaminal Epidural Steroid Injection/ Selective Nerve Block    You had a transforaminal epidural steroid injection/ selective nerve block today. You will probably have some numbness, and possibly weakness, in your leg for the next 6 to 8 hours. The steroids will slowly become effective, reducing your pain, over the next 2 weeks. You should begin feeling better after a few days, but it may take up to 2 weeks to notice the difference. The benefit you get from your injection will last a variable amount of time, depending on the severity of your lumbar spine problem.  Pain: Most people do not have any increase in pain after this injection. However, you might experience some soreness in your low back. If this happens, putting an ice pack over the sore area will help.  Bandage: You will have a small bandage covering the site of the injection. You may remove it once you get home.  Restrictions: Someone should drive you home after the injection. After that, you have no restrictions. You need to be careful while walking, as you may still have some numbness or weakness in your leg. You may resume your normal level of activity. You may take a shower or bath, and you may eat normally. You should continue your current exercises and/or therapy routine.   Medications: Continue your current medications as prescribed. If your pain decreases, you may reduce the amount of your pain medicines. If you stopped taking anticoagulants or blood-thinners before the injection, start them tomorrow. If you have diabetes, your blood sugar may be elevated for a few days. Call your primary doctor with any questions.   Call Dr. Mike Cloud at [phone] if you experience:   Fever (101 degrees Fahrenheit or greater)   Nausea or vomiting   Headache unrelieved by your normal pain medicine   Redness or swelling at the injection site that lasts more than 1 day   New numbness, tingling, weakness, or pain that you didnt have before the injection    Follow-up appointment:   If still having pain in 1-2 weeks, call office at [phone] for a follow up appointment. DISCHARGE SUMMARY from Nurse    The following personal items collected during your admission are returned to you:   Dental Appliance: Dental Appliances: None  Vision: Visual Aid: Glasses  Hearing Aid:    Jewelry: Jewelry: None  Clothing: Clothing: With patient  Other Valuables: Other Valuables: None  Valuables sent to safe: If you were given prescriptions, please review the written information on prescribed medications. · You will receive a Post Operative Call from one of the Recovery Room Nurses on the day after your surgery to check on you. It is very important for us to know how you are recovering after your surgery. · You may receive an e-mail or letter in the mail from [employer] regarding your experience with us in the Ambulatory Surgery Unit. Your feedback is valuable to us and we appreciate your participation in the survey. If you have not had your influenza or pneumococcal vaccines, please follow up with your primary care physician. The discharge information has been reviewed with the patient. The patient verbalized understanding.

## 2019-07-09 NOTE — PERIOP NOTES
Skin assessment:   WNL   Skin color: wnl for ethnicity    Skin condition: wnl for age   Skin integrity: wnl for age   Turgor: wnl     Neuro:  Push/Pull assessment:     LUE Response: strong    LLE Response: strong push/moderate pull   RUE Response: strong    RLE Response: strong push/ moderate pull    OB/Gyn assessment:    LMP:     If no menstrual period then reason: male patient

## 2019-07-09 NOTE — PERIOP NOTES
Pt has strong and equal plantar and dorsi flexion. 1519-Pt able to stand and hold weight. No swelling or bleeding at injection sites. D/c instructions reviewed with pt. Transported via w/c to awaiting transportation.

## 2019-07-09 NOTE — H&P
Procedural Case Note    7/9/2019    (1:49 PM)    Ish Latif    1960   (60 y.o.)    [id number]    CC:  pain    ROS:   Complete ROS obtained, no CP, no SOB, no N or V    PMH:     Past Medical History:   Diagnosis Date    Borderline diabetes     Bradycardia 06/2019    per pt they changed BP med and all is resolved; Dr. Kat Serrano Chronic pain     back    Colitis 12/3/2018    Degenerative disc disease, lumbar     Essential hypertension 12/3/2018    GERD (gastroesophageal reflux disease)     hiatal hernia    H/O hernia repair 12/11/2018    History of cholecystectomy 12/11/2018    Ill-defined condition     ruptured gall bladder    Ill-defined condition     hx of decreased O2 sats\" per pt he has been in the 90's and had to have a breathing tx prior to back injection\"     Osteoarthritis of spine with radiculopathy, lumbar region 12/11/2018    Smoker 12/11/2018       ALLERGIES:     Allergies   Allergen Reactions    Aspirin Other (comments)     Nose bleeds    Morphine Other (comments)     Agitation       MEDS:     Current Facility-Administered Medications   Medication Dose Route Frequency    bupivacaine (PF) (MARCAINE) 0.5 % (5 mg/mL) injection 25 mg  5 mL Epidural ONCE    methylPREDNISolone acetate (DEPO-MEDROL) 40 mg/mL injection 40 mg  40 mg IntraMUSCular ONCE    lidocaine (XYLOCAINE) 20 mg/mL (2 %) injection 200 mg  10 mL IntraDERMal ONCE    sodium bicarbonate (4%) (NEUT) injection 1 mL  1 mL SubCUTAneous ONCE    iohexol (OMNIPAQUE) 180 mg iodine/mL injection 10 mL  10 mL Intra artICUlar ONCE          Visit Vitals  /78 (BP 1 Location: Right arm, BP Patient Position: At rest)   Pulse 63   Temp 98.5 °F (36.9 °C)   Resp 18   Ht 6' (1.829 m)   Wt 97.1 kg (214 lb)   SpO2 93%   BMI 29.02 kg/m²     PE:  Gen: NAD  Head: normocephalic  Heart: RRR  Lungs: CTA arlette  Abd: NT, ND, soft  Neuro: awake and alert  Skin: intact    IMPRESSION:   LS DDD    Note:  The clinical status was discussed in detail with the patient. The procedure was again discussed and described in detail. All understand and accept the planned procedure and risks; reject other forms of treatment. All questions are answered.     Andrés Paul MD

## 2019-10-28 DIAGNOSIS — N40.1 BENIGN LOCALIZED PROSTATIC HYPERPLASIA WITH LOWER URINARY TRACT SYMPTOMS (LUTS): ICD-10-CM

## 2019-10-28 NOTE — LETTER
11/11/2019 3:52 PM 
 
Mr. Melinda Rodriguez 
[address] [facility] [address] Melinda Rodriguez, We received a medication refill request. It was filled. We need you to call and schedule  
 
an appointment to be seen. Please call [phone] to schedule appointment. Sincerely, Phil Zhu MD

## 2019-10-31 RX ORDER — TAMSULOSIN HYDROCHLORIDE 0.4 MG/1
CAPSULE ORAL
Qty: 90 CAP | Refills: 0 | Status: SHIPPED | OUTPATIENT
Start: 2019-10-31 | End: 2019-12-03 | Stop reason: ALTCHOICE

## 2019-10-31 NOTE — TELEPHONE ENCOUNTER
Pt Last seen >6 months ago  Med refill 1 month until f/u  pls call for F/u appointment    thx    Melissa Escobar MD  4/3/2018

## 2019-11-20 ENCOUNTER — PATIENT OUTREACH (OUTPATIENT)
Dept: FAMILY MEDICINE CLINIC | Age: 59
End: 2019-11-20

## 2019-11-20 NOTE — PROGRESS NOTES
Ambulatory Care Manager outreached to patient today to offer care management services. Introduction to self and role of care manager provided. Patient accepted care management services at this time. Follow up call scheduled at this time. Of note:PCP f/u appointment was made with patient for  12/3/19 at 1040 am. Patient reports he has transportation. Patient acknowledged understanding of appointment time and date. Patient reported that he has had all his teeth removed and he does not have enough money to purchase dentures. He stated he does use protein supplements when he can afford to purchase them. Patient stated he does not like the supplemental drinks like boost or ensure. He reports he is on spend down for Medicaid- approx $4000. Patient reports he does not check blood pressures at home, rather goes to local pharmacy once a week. Patient reported his wife was discharged from hospital yesterday s/p a cardiac cath. Patient has  PCP and Ambulatory Care Manager's contact number for for any questions or concerns. Patient ended conversation abruptly.  JESSICA

## 2019-12-03 ENCOUNTER — OFFICE VISIT (OUTPATIENT)
Dept: FAMILY MEDICINE CLINIC | Age: 59
End: 2019-12-03

## 2019-12-03 ENCOUNTER — HOSPITAL ENCOUNTER (OUTPATIENT)
Dept: LAB | Age: 59
Discharge: HOME | End: 2019-12-03
Payer: MEDICARE

## 2019-12-03 VITALS
BODY MASS INDEX: 29.12 KG/M2 | HEIGHT: 72 IN | HEART RATE: 74 BPM | WEIGHT: 215 LBS | DIASTOLIC BLOOD PRESSURE: 71 MMHG | SYSTOLIC BLOOD PRESSURE: 111 MMHG | RESPIRATION RATE: 18 BRPM | OXYGEN SATURATION: 95 % | TEMPERATURE: 97.1 F

## 2019-12-03 DIAGNOSIS — R73.03 PREDIABETES: ICD-10-CM

## 2019-12-03 DIAGNOSIS — Z71.6 ENCOUNTER FOR SMOKING CESSATION COUNSELING: ICD-10-CM

## 2019-12-03 DIAGNOSIS — I10 ESSENTIAL HYPERTENSION: Primary | ICD-10-CM

## 2019-12-03 DIAGNOSIS — Z79.899 ENCOUNTER FOR LONG-TERM (CURRENT) USE OF MEDICATIONS: ICD-10-CM

## 2019-12-03 DIAGNOSIS — E78.2 MIXED HYPERLIPIDEMIA: ICD-10-CM

## 2019-12-03 DIAGNOSIS — F17.200 SMOKER: ICD-10-CM

## 2019-12-03 DIAGNOSIS — N40.1 BENIGN LOCALIZED PROSTATIC HYPERPLASIA WITH LOWER URINARY TRACT SYMPTOMS (LUTS): ICD-10-CM

## 2019-12-03 PROCEDURE — 83036 HEMOGLOBIN GLYCOSYLATED A1C: CPT

## 2019-12-03 PROCEDURE — 80053 COMPREHEN METABOLIC PANEL: CPT

## 2019-12-03 PROCEDURE — 36415 COLL VENOUS BLD VENIPUNCTURE: CPT

## 2019-12-03 PROCEDURE — 80061 LIPID PANEL: CPT

## 2019-12-03 RX ORDER — DOXAZOSIN 4 MG/1
4 TABLET ORAL DAILY
Qty: 30 TAB | Refills: 3 | Status: SHIPPED | OUTPATIENT
Start: 2019-12-03 | End: 2020-06-30 | Stop reason: SDDI

## 2019-12-03 RX ORDER — BUPROPION HYDROCHLORIDE 150 MG/1
150 TABLET ORAL
Qty: 30 TAB | Refills: 2 | Status: SHIPPED | OUTPATIENT
Start: 2019-12-03 | End: 2021-01-07 | Stop reason: ALTCHOICE

## 2019-12-03 NOTE — PROGRESS NOTES
Kristian Montiel is a 60 y.o. male    Pt saw me in 05/2019, we made changes to his meds, was to f/u in 3 months, now it's 7 months later. Noncompliant to f/u.      has a past medical history of Borderline diabetes, Bradycardia (06/2019), Chronic pain, Colitis (12/3/2018), Degenerative disc disease, lumbar, Essential hypertension (12/3/2018), GERD (gastroesophageal reflux disease), H/O hernia repair (12/11/2018), History of cholecystectomy (12/11/2018), Ill-defined condition, Ill-defined condition, Osteoarthritis of spine with radiculopathy, lumbar region (12/11/2018), and Smoker (12/11/2018). HTN: BP at goal today. continue amlodipine     He have seen cardiology, no changes was made. He is at higher risk for CAD. HLD: Trigly 175 and . Hx of HTN, Smoker, age 60, Male and also borderline diabetes. Last visit we started him on Lipitor 10mg. LUTS: Nocturia, weak urine stream. PSA is 0.6 02/2019, WNL. Flomax 0.4mg helped in the past but is no longer helping, 3-4 months now waking up 3-4X at night. We'll stop flomax  Start cardura    Prediabetes: a1C 6.2% 05/2019, discussed pathophysiology and risk, complication. For now he want to try with diet and weight loss, we'll recheck in 3 months. He denies CP, SOB. Its been more than a year he's been more tired, due to multiple things, his back hurt, no longer able to do physical exerting activities. Ref cardiologist.     Smoker. 40 packs years. Discuss cessation. We'll start wellbutrin    BMI 30: Discussed the patient's BMI with him. The BMI follow up plan is as follows:   dietary management education, guidance, and counseling encourage exercise monitor weight prescribed dietary intake    Have seen 2 orthopedics, have had 2 more injection to his back since our last visit. I've started him on Lyrica, and ortho have raised to to 100mg and 150mg and it didn't help, he stopped taking it 1 week ago.    Lumbar DJD hx. Itzel Villela about 30 injection in his spines\". But have headache afterward. Was supposed to have surgery 6-7 years ago. Sciatica on right side. Denies loss of bowel or bladder, saddle anesthesia or other redflags. Have been taking ibuprofen 1200-1600mg daily for the past 2 weeks b/c of back pain and muscle spasm. Says he have tried gabapentin and it didn't help, and he doesn't want to bother with it. Currently managed by orthopedic    Colonoscopy 12/2018 showed 2 polyps, next due 5 years.      Smoker 1ppd X 40 years     Allergies to aspirin      Reviewed: active problem list, medication list, allergies, notes from last encounter    A comprehensive review of systems was negative except for that written in the HPI. Allergies   Allergen Reactions    Aspirin Other (comments)     Nose bleeds    Morphine Other (comments)     Agitation     Current Outpatient Medications on File Prior to Visit   Medication Sig Dispense Refill    atorvastatin (LIPITOR) 10 mg tablet TAKE 1 TABLET EVERY DAY 90 Tab 0    amLODIPine (NORVASC) 5 mg tablet TAKE 1 TABLET EVERY DAY 90 Tab 0    ibuprofen (MOTRIN) 200 mg tablet Take 600 mg by mouth every eight (8) hours as needed for Pain. No current facility-administered medications on file prior to visit. Patient Active Problem List   Diagnosis Code    Colitis K52.9    Essential hypertension I10    Osteoarthritis of spine with radiculopathy, lumbar region M47.26    Smoker F17.200    History of cholecystectomy Z90.49    H/O hernia repair Z98.890, Z87.19       Visit Vitals  /71   Pulse 74   Temp 97.1 °F (36.2 °C) (Oral)   Resp 18   Ht 6' (1.829 m)   Wt 215 lb (97.5 kg)   SpO2 95%   BMI 29.16 kg/m²     General appearance: alert, cooperative, no distress, appears older than stated age  Neurologic: Alert and oriented X 3, normal strength and tone, symmetric. Normal without focal findings. Cranial nerves 2-12 intact. Normal coordination and gait.   Mental status: Alert, oriented, thought content appropriate, affect: stable, mood-congruent. Head: Normocephalic, without obvious abnormality, atraumatic  Eyes: conjunctivae/corneas clear. PERRL, EOM's intact. Neck: supple, symmetrical, trachea midline, no JVD  Lungs: clear to auscultation bilaterally  Heart: regular rate and rhythm, S1, S2 normal, no murmur, click, rub or gallop  Abdomen: soft, non-tender. Extremities: extremities normal, atraumatic, no cyanosis or edema      Assessment/Plans:    Diagnoses and all orders for this visit:    1. Essential hypertension    2. Benign localized prostatic hyperplasia with lower urinary tract symptoms (LUTS)  -     doxazosin (CARDURA) 4 mg tablet; Take 1 Tab by mouth daily. 3. Mixed hyperlipidemia  -     METABOLIC PANEL, COMPREHENSIVE  -     LIPID PANEL    4. Prediabetes  -     HEMOGLOBIN A1C W/O EAG    5. Smoker  -     buPROPion XL (WELLBUTRIN XL) 150 mg tablet; Take 1 Tab by mouth every morning. 6. Encounter for smoking cessation counseling  -     buPROPion XL (WELLBUTRIN XL) 150 mg tablet; Take 1 Tab by mouth every morning. 7. Encounter for long-term (current) use of medications  -     HEMOGLOBIN A1C W/O EAG  -     METABOLIC PANEL, COMPREHENSIVE  -     LIPID PANEL      Discussed plans, risk/benefits of treatments/observations. Through the use of shared decision making, above plans were agreed upon. Medication compliance advised. Patient verbalized understanding.      Follow-up and Dispositions    · Return in about 4 months (around 4/3/2020) for HTN, prediabetes, sooner if labs abnormal.           Shayla Escobar MD  12/3/2019

## 2019-12-03 NOTE — PROGRESS NOTES
Chief Complaint   Patient presents with    Diabetes    Hypertension     Check meds & labs    1. Have you been to the ER, urgent care clinic since your last visit? Hospitalized since your last visit? no    2. Have you seen or consulted any other health care providers outside of the [address] [place] [address] since your last visit? Include any pap smears or colon screening.  yes

## 2019-12-04 LAB
ALBUMIN SERPL-MCNC: 4.4 G/DL (ref 3.6–4.8)
ALBUMIN/GLOB SERPL: 1.8 {RATIO} (ref 1.2–2.2)
ALP SERPL-CCNC: 63 IU/L (ref 39–117)
ALT SERPL-CCNC: 41 IU/L (ref 0–44)
AST SERPL-CCNC: 34 IU/L (ref 0–40)
BILIRUB SERPL-MCNC: 0.4 MG/DL (ref 0–1.2)
BUN SERPL-MCNC: 16 MG/DL (ref 8–27)
BUN/CREAT SERPL: 15 (ref 10–24)
CALCIUM SERPL-MCNC: 9.8 MG/DL (ref 8.6–10.2)
CHLORIDE SERPL-SCNC: 105 MMOL/L (ref 96–106)
CHOLEST SERPL-MCNC: 150 MG/DL (ref 100–199)
CO2 SERPL-SCNC: 23 MMOL/L (ref 20–29)
CREAT SERPL-MCNC: 1.05 MG/DL (ref 0.76–1.27)
GLOBULIN SER CALC-MCNC: 2.5 G/DL (ref 1.5–4.5)
GLUCOSE SERPL-MCNC: 98 MG/DL (ref 65–99)
HBA1C MFR BLD: 6.1 % (ref 4.8–5.6)
HDLC SERPL-MCNC: 39 MG/DL
LDLC SERPL CALC-MCNC: 81 MG/DL (ref 0–99)
POTASSIUM SERPL-SCNC: 4.5 MMOL/L (ref 3.5–5.2)
PROT SERPL-MCNC: 6.9 G/DL (ref 6–8.5)
SODIUM SERPL-SCNC: 144 MMOL/L (ref 134–144)
TRIGL SERPL-MCNC: 150 MG/DL (ref 0–149)
VLDLC SERPL CALC-MCNC: 30 MG/DL (ref 5–40)

## 2020-01-06 ENCOUNTER — PATIENT OUTREACH (OUTPATIENT)
Dept: FAMILY MEDICINE CLINIC | Age: 60
End: 2020-01-06

## 2020-01-06 NOTE — LETTER
Vitor Recinos 
[address] [facility] [address] I am a Nurse Ambulatory Care Manager working with Dr James Campoverde, PCP. As discussed  part of my job is to follow up with patients regarding their chronic health conditions and provide resources and education to patients and family members. Thank you for taking the time to speak with me today. Enclosed you will find the Planning Healthy Meals, s/s of hyperglycemia and hypoglycemia and Know your Numbers information that we discussed. In the meantime, if you have any questions or concerns, please feel free to call me on my direct line at [phone]  between 8 am and 5 pm.  
 
Thank you for allowing me to participate in your care! Enclosed: [employer], Know your numbers, and S/SX of hypoglycemia and hyperglycemia. Sincerely, Claribel Lieberman RN, Ambulatory  Care Manager [address] [facility], [address] [place], [facility] [address] 
 
[phone]   fax [phone]

## 2020-01-06 NOTE — PROGRESS NOTES
Ambulatory Care Management Note      Date/Time:  1/6/2020 2:28 PM    Top Challenges reviewed with the provider   AIC 6.1 on 12/3/19, 6.1 on 5/22/19 and 6.4 on 2/7/19- not on medication- does not check blood sugars at home on consistant basis    Decreased smoking- from 1 pack daily to 2-3 cigarettes- is still on wellbutrin    Chronic back pain- still takes ibuprofen 600 mg Q 8 hours for pain             Ambulatory  contacted patient for discussion and case management of pre diabetes, increased family stress, and decrease smoking   Summary of patients top problems:   1. Prediabetes: A1C 6.1% 12/3/19, discussed pathophysiology and risk, complication. For now he want to try with diet and weight loss, and increased activity  2. Increased family stressors- wife just had major CVA- was in rehab and now is just returning home- he is primary caregiver  3. Smoker- on last PCP office visit was put on Wellbutrin-reports he has decreased his cigarettes usage from 1 pack daily to 2-3 cigarettes daily-is still taking his wellbutrin  PCP/Specialist follow up:   Future Appointments   Date Time Provider Abraham Wyatt   4/7/2020 10:20 AM Katy Miranda MD [address] [facility] Patient/Family verbalizes understanding of self-management of chronic disease. 1/6/2020 Patient reports he is doing \"pretty good\". He stated his biggest concern right now is caring for his wife who suffered a CVA several weeks ago and is now home from [address] [place] [address]. He is the primary caregiver. He reports he has made several lifestyle changes for her and himself. Patient reports he is cooking healthier meals for both of them, he is following a lot of the recommendations made for his wife to include decreasing sodium intake, eating less fried foods,cutting back on carbohydrates and increasing activity as tolerated. He reported he checks his blood glucose level infrequently but averages around 100.  Patient stated he is working to decrease his use of cigarettes, he has decreased his usage from 1 pack a day to 2-3 cigarettes a day. He reports he is continuing his wellbutrin. Medication reconciliation was completed and patient reports he is taking medications as prescribed and has all medications in the home. Patient reports he continues with chronic back pain and will contact Ortho for further pain control. Patient was made aware of his upcoming PCP appointment 4/7/2020. Patient was instructed in heart healthy, low carb diet. Patient was provided ACM and PCP contact information for questions or concerns. Plan patient to attend medical appointments as scheduled. Patient to take medications as prescribed. Patient to continue to work on decreasing intake of high carbohydrate foods, eat less fried foods, and  monitor sodium intake. ACM will send patient the brochure \"Planning Healthy Meals. \" ACM will discuss with patient the best food choices. ACM will monitor patients adherence to plan of care. ACM will attempt to contact patient in 2-3 weeks. DMB             Patient verbalized understanding of all information discussed. Patient has this [address] [place] [address] and PCP  contact information for any further questions, concerns, or needs.

## 2020-02-10 DIAGNOSIS — I10 ESSENTIAL HYPERTENSION: ICD-10-CM

## 2020-02-13 DIAGNOSIS — E78.2 MIXED HYPERLIPIDEMIA: ICD-10-CM

## 2020-02-14 ENCOUNTER — PATIENT OUTREACH (OUTPATIENT)
Dept: FAMILY MEDICINE CLINIC | Age: 60
End: 2020-02-14

## 2020-02-14 NOTE — PROGRESS NOTES
Goals      Patient/Family verbalizes understanding of self-management of chronic disease. 2/14/20 Patient reports he is doing much better since his wife has returned home from her recent CVA of 6 weeks ago. He stated he is doing all the grocery shopping and is monitoring both of there food intake. He reports they have decreased sodium intake, eating more fresh vegetables, stopped eating fried foods. He stated they do not eat \"fast food\" any more as it taste too salty. \" Patient was instructed in heart healthy, low carb diet. 2) Patient reports he is checking his blood glucose level daily and it is running about 90.   3) He reports he has decreased his smoking of cigarettes but has not stopped. He stated he does not smoke in the home-as his wife quit 6 weeks ago. Patient was made aware of his upcoming PCP appointment 4/7/2020. Patient was provided ACM and PCP contact information for questions or concerns. Plan patient to attend medical appointments as scheduled. Patient to take medications as prescribed. Patient to continue to work on decreasing intake of high carbohydrate foods, eat less fried foods, and  monitor sodium intake. ACM will monitor patients adherence to plan of care. ACM will attempt to contact patient in 2-3 weeks. [facility]    1/6/2020 Patient reports he is doing \"pretty good\". He stated his biggest concern right now is caring for his wife who suffered a CVA several weeks ago and is now home from [address] [place] [address]. He is the primary caregiver. He reports he has made several lifestyle changes for her and himself. Patient reports he is cooking healthier meals for both of them, he is following a lot of the recommendations made for his wife to include decreasing sodium intake, eating less fried foods,cutting back on carbohydrates and increasing activity as tolerated. He reported he checks his blood glucose level infrequently but averages around 100.  Patient stated he is working to decrease his use of cigarettes, he has decreased his usage from 1 pack a day to 2-3 cigarettes a day. He reports he is continuing his wellbutrin. Medication reconciliation was completed and patient reports he is taking medications as prescribed and has all medications in the home. Patient reports he continues with chronic back pain and will contact Ortho for further pain control. Patient was made aware of his upcoming PCP appointment 4/7/2020. Patient was instructed in heart healthy, low carb diet. Patient was provided ACM and PCP contact information for questions or concerns. Plan patient to attend medical appointments as scheduled. Patient to take medications as prescribed. Patient to continue to work on decreasing intake of high carbohydrate foods, eat less fried foods, and  monitor sodium intake. ACM will discuss with patient the best food choices. ACM will monitor patients adherence to plan of care. ACM will attempt to contact patient in 2-3 weeks.  MIQUELB

## 2020-02-17 RX ORDER — AMLODIPINE BESYLATE 5 MG/1
TABLET ORAL
Qty: 90 TAB | Refills: 1 | Status: SHIPPED | OUTPATIENT
Start: 2020-02-17 | End: 2020-06-23

## 2020-02-17 RX ORDER — ATORVASTATIN CALCIUM 10 MG/1
TABLET, FILM COATED ORAL
Qty: 90 TAB | Refills: 0 | Status: SHIPPED | OUTPATIENT
Start: 2020-02-17 | End: 2020-04-21

## 2020-04-20 DIAGNOSIS — E78.2 MIXED HYPERLIPIDEMIA: ICD-10-CM

## 2020-04-21 RX ORDER — ATORVASTATIN CALCIUM 10 MG/1
TABLET, FILM COATED ORAL
Qty: 90 TAB | Refills: 0 | Status: SHIPPED | OUTPATIENT
Start: 2020-04-21 | End: 2020-06-30 | Stop reason: SDUPTHER

## 2020-06-22 DIAGNOSIS — I10 ESSENTIAL HYPERTENSION: ICD-10-CM

## 2020-06-23 RX ORDER — AMLODIPINE BESYLATE 5 MG/1
TABLET ORAL
Qty: 90 TAB | Refills: 0 | Status: SHIPPED | OUTPATIENT
Start: 2020-06-23 | End: 2020-06-30 | Stop reason: SDUPTHER

## 2020-06-30 ENCOUNTER — OFFICE VISIT (OUTPATIENT)
Dept: FAMILY MEDICINE CLINIC | Age: 60
End: 2020-06-30

## 2020-06-30 ENCOUNTER — HOSPITAL ENCOUNTER (OUTPATIENT)
Dept: LAB | Age: 60
Discharge: HOME | End: 2020-06-30
Payer: MEDICARE

## 2020-06-30 VITALS
HEIGHT: 72 IN | DIASTOLIC BLOOD PRESSURE: 70 MMHG | SYSTOLIC BLOOD PRESSURE: 108 MMHG | BODY MASS INDEX: 28.61 KG/M2 | OXYGEN SATURATION: 96 % | HEART RATE: 69 BPM | RESPIRATION RATE: 18 BRPM | TEMPERATURE: 97.7 F | WEIGHT: 211.2 LBS

## 2020-06-30 DIAGNOSIS — I10 ESSENTIAL HYPERTENSION: ICD-10-CM

## 2020-06-30 DIAGNOSIS — Z00.00 MEDICARE ANNUAL WELLNESS VISIT, INITIAL: Primary | ICD-10-CM

## 2020-06-30 DIAGNOSIS — Z79.899 ENCOUNTER FOR LONG-TERM (CURRENT) USE OF MEDICATIONS: ICD-10-CM

## 2020-06-30 DIAGNOSIS — E78.2 MIXED HYPERLIPIDEMIA: ICD-10-CM

## 2020-06-30 DIAGNOSIS — Z71.6 ENCOUNTER FOR SMOKING CESSATION COUNSELING: ICD-10-CM

## 2020-06-30 DIAGNOSIS — R73.03 PREDIABETES: ICD-10-CM

## 2020-06-30 PROCEDURE — 80048 BASIC METABOLIC PNL TOTAL CA: CPT

## 2020-06-30 PROCEDURE — 36415 COLL VENOUS BLD VENIPUNCTURE: CPT

## 2020-06-30 PROCEDURE — 80076 HEPATIC FUNCTION PANEL: CPT

## 2020-06-30 PROCEDURE — 83036 HEMOGLOBIN GLYCOSYLATED A1C: CPT

## 2020-06-30 PROCEDURE — 85027 COMPLETE CBC AUTOMATED: CPT

## 2020-06-30 PROCEDURE — 84443 ASSAY THYROID STIM HORMONE: CPT

## 2020-06-30 RX ORDER — AMLODIPINE BESYLATE 5 MG/1
TABLET ORAL
Qty: 90 TAB | Refills: 1 | Status: SHIPPED | OUTPATIENT
Start: 2020-06-30 | End: 2020-09-14

## 2020-06-30 RX ORDER — TRIAMCINOLONE ACETONIDE 1 MG/G
PASTE DENTAL
COMMUNITY
Start: 2020-06-18 | End: 2021-01-07 | Stop reason: ALTCHOICE

## 2020-06-30 RX ORDER — ATORVASTATIN CALCIUM 10 MG/1
TABLET, FILM COATED ORAL
Qty: 90 TAB | Refills: 0 | Status: SHIPPED | OUTPATIENT
Start: 2020-06-30 | End: 2020-07-23

## 2020-06-30 NOTE — PROGRESS NOTES
This is a Rukhsana Exam (AWV)     I have reviewed the patient's medical history in detail and updated the computerized patient record. Connie Magallon is a 63 y.o. male    Pt doesn't f/u as we recommend. Past two visits we started new meds, he was to f/u much sooner, and he doesn't f/u until 7 months later. Noncompliant to f/u.        has a past medical history of Borderline diabetes, Bradycardia (06/2019), Chronic pain, Colitis (12/3/2018), Degenerative disc disease, lumbar, Essential hypertension (12/3/2018), GERD (gastroesophageal reflux disease), H/O hernia repair (12/11/2018), History of cholecystectomy (12/11/2018), Ill-defined condition, Ill-defined condition, Osteoarthritis of spine with radiculopathy, lumbar region (12/11/2018), and Smoker (12/11/2018). His wife recently passed due to a heart attack and also had a Stroke. Emotionally he's doing better. He does have family nearby in [place]. He is still doing yard work, finding things to enjoy. Sleep is OK. HTN: BP at goal today. continue amlodipine     He have seen cardiology, no changes was made. He is at higher risk for CAD. HLD: Trigly 175 and . Hx of HTN, Smoker, age 60, Male and also borderline diabetes. Last visit we started him on Lipitor 10mg. LUTS: report no longer have issue, not taking meds. Prediabetes: a1C 6.1% 12/2019, 6.2% 05/2019, discussed pathophysiology and risk, complication. For now he want to try with diet and weight loss, we'll recheck in 3 months. He denies CP, SOB. Smoker. 40+ packs years. Discuss cessation. He never started wellbutrin    BMI 30: Discussed the patient's BMI with him. The BMI follow up plan is as follows:   dietary management education, guidance, and counseling encourage exercise monitor weight prescribed dietary intake    Have seen 2 orthopedics, have had 2 more injection to his back since our last visit.     I've started him on Lyrica, and ortho have raised to to 100mg and 150mg and it didn't help, he stopped taking it 1 week ago. Lumbar DJD hx. Renetta Flor about 30 injection in his spines\". But have headache afterward. Was supposed to have surgery 6-7 years ago. Sciatica on right side. Denies loss of bowel or bladder, saddle anesthesia or other redflags. Have been taking ibuprofen 1200-1600mg daily for the past 2 weeks b/c of back pain and muscle spasm. Says he have tried gabapentin and it didn't help, and he doesn't want to bother with it.    Currently managed by orthopedic    Colonoscopy 12/2018 showed 2 polyps, next due 5 years.      Allergies to aspirin      Reviewed: active problem list, medication list, allergies, notes from last encounter      History     Past Medical History:   Diagnosis Date    Borderline diabetes     Bradycardia 06/2019    per pt they changed BP med and all is resolved; Dr. Reanna Mosquera Chronic pain     back    Colitis 12/3/2018    Degenerative disc disease, lumbar     Essential hypertension 12/3/2018    GERD (gastroesophageal reflux disease)     hiatal hernia    H/O hernia repair 12/11/2018    History of cholecystectomy 12/11/2018    Ill-defined condition     ruptured gall bladder    Ill-defined condition     hx of decreased O2 sats\" per pt he has been in the 90's and had to have a breathing tx prior to back injection\"     Osteoarthritis of spine with radiculopathy, lumbar region 12/11/2018    Smoker 12/11/2018      Past Surgical History:   Procedure Laterality Date    COLONOSCOPY N/A 12/18/2018    [facility]  12/18/2018         HX CHOLECYSTECTOMY      HX COLONOSCOPY      HX HEENT  06/24/2019    oral surgery on back teeth    HX ORTHOPAEDIC      lt shoulder, muscle and skin graft    HX ORTHOPAEDIC      rt shoulder, anchors in place    HX ORTHOPAEDIC      rt foot, removed bone spur by great toe    HX ORTHOPAEDIC      rt hand, removed infected and cyst on wrist    HX OTHER SURGICAL      incisional hernia     Current Outpatient Medications   Medication Sig Dispense Refill    triamcinolone acetonide (KENALOG) 0.1 % dental paste apply to affected area four times a day      amLODIPine (NORVASC) 5 mg tablet TAKE 1 TABLET EVERY DAY 90 Tab 1    atorvastatin (LIPITOR) 10 mg tablet TAKE 1 TABLET EVERY DAY 90 Tab 0    ibuprofen (MOTRIN) 200 mg tablet Take 600 mg by mouth every eight (8) hours as needed for Pain.  buPROPion XL (WELLBUTRIN XL) 150 mg tablet Take 1 Tab by mouth every morning.  30 Tab 2     Allergies   Allergen Reactions    Aspirin Other (comments)     Nose bleeds    Morphine Other (comments)     Agitation     Family History   Problem Relation Age of Onset    Cancer Mother     Cancer Father      Social History     Tobacco Use    Smoking status: Current Every Day Smoker     Packs/day: 0.50     Years: 40.00     Pack years: 20.00     Types: Cigarettes    Smokeless tobacco: Never Used   Substance Use Topics    Alcohol use: Yes     Comment: rarely     Patient Active Problem List   Diagnosis Code    Colitis K52.9    Essential hypertension I10    Osteoarthritis of spine with radiculopathy, lumbar region M47.26    Smoker F17.200    History of cholecystectomy Z90.49    H/O hernia repair Z98.890, Z87.19         Depression Risk Factor Screening:     3 most recent PHQ Screens 6/30/2020   Little interest or pleasure in doing things Not at all   Feeling down, depressed, irritable, or hopeless Several days   Total Score PHQ 2 1   Trouble falling or staying asleep, or sleeping too much -   Feeling tired or having little energy -   Poor appetite, weight loss, or overeating -   Feeling bad about yourself - or that you are a failure or have let yourself or your family down -   Trouble concentrating on things such as school, work, reading, or watching TV -   Moving or speaking so slowly that other people could have noticed; or the opposite being so fidgety that others notice -   Thoughts of being better off dead, or hurting yourself in some way -   PHQ 9 Score -   How difficult have these problems made it for you to do your work, take care of your home and get along with others -     Alcohol Risk Factor Screening: You do not drink alcohol or very rarely. Functional Ability and Level of Safety:     Hearing Loss   Hearing is good. Activities of Daily Living   Self-care. Requires assistance with: no ADLs    Fall Risk   No flowsheet data found. Abuse Screen   Patient is not abused    Review of Systems   A comprehensive review of systems was negative except for that written in the HPI. Physical Examination     Evaluation of Cognitive Function:  Mood/affect:  neutral  Appearance: age appropriate and casually dressed  Family member/caregiver input: none present    Visit Vitals  /70   Pulse 69   Temp 97.7 °F (36.5 °C) (Temporal)   Resp 18   Ht 6' (1.829 m)   Wt 211 lb 3.2 oz (95.8 kg)   SpO2 96%   BMI 28.64 kg/m²     General appearance: alert, cooperative, no distress, appears stated age  Neurologic: Alert and oriented X 3, normal strength and tone, symmetric. Normal without focal findings. Cranial nerves 2-12 intact. Normal coordination and gait. Mental status: Alert, oriented, thought content appropriate, affect: stable, mood-congruent. Head: Normocephalic, without obvious abnormality, atraumatic  Eyes: conjunctivae/corneas clear. PERRL, EOM's intact. Neck: supple, symmetrical, trachea midline, no JVD  Lungs: clear to auscultation bilaterally  Heart: regular rate and rhythm, S1, S2 normal, no murmur, click, rub or gallop  Abdomen: soft, non-tender.    Extremities: extremities normal, atraumatic, no cyanosis or edema    Patient Care Team:  Scottie Wetzel MD as PCP - General (Family Practice)  Scottie Wetzel MD as PCP - [facility] Empaneled Provider  Nelly Lovelace MD (Cardiology)  Baltazar Carson RN as Ambulatory Care Manager (Internal Medicine)    Advice/Referrals/Counseling   Education and counseling provided:  Are appropriate based on today's review and evaluation  End-of-Life planning (with patient's consent)  Pneumococcal Vaccine  Influenza Vaccine  Colorectal cancer screening tests  Diabetes screening test    Assessment/Plan     Diagnoses and all orders for this visit:    1. Medicare annual wellness visit, initial    2. Essential hypertension  -     METABOLIC PANEL, BASIC  -     HEPATIC FUNCTION PANEL  -     TSH 3RD GENERATION  -     CBC W/O DIFF  -     amLODIPine (NORVASC) 5 mg tablet; TAKE 1 TABLET EVERY DAY    3. Mixed hyperlipidemia  -     METABOLIC PANEL, BASIC  -     HEPATIC FUNCTION PANEL  -     TSH 3RD GENERATION  -     CBC W/O DIFF  -     atorvastatin (LIPITOR) 10 mg tablet; TAKE 1 TABLET EVERY DAY    4. Prediabetes  -     HEMOGLOBIN A1C W/O EAG    5. Encounter for smoking cessation counseling    6. Encounter for long-term (current) use of medications  -     HEMOGLOBIN A1C W/O EAG  -     METABOLIC PANEL, BASIC  -     HEPATIC FUNCTION PANEL  -     TSH 3RD GENERATION  -     CBC W/O DIFF    . Follow-up and Dispositions    · Return in about 5 months (around 11/30/2020) for HTN, prediabetes, HLD. Júnior Freeman MD  6/30/2020.

## 2020-06-30 NOTE — PROGRESS NOTES
Chief Complaint   Patient presents with    Hypertension    Annual Wellness Visit       Check meds    1. Have you been to the ER, urgent care clinic since your last visit? Hospitalized since your last visit? no    2. Have you seen or consulted any other health care providers outside of the [address] [place] [address] since your last visit? Include any pap smears or colon screening.  no

## 2020-07-01 LAB
ALBUMIN SERPL-MCNC: 4.4 G/DL (ref 3.8–4.8)
ALP SERPL-CCNC: 65 IU/L (ref 39–117)
ALT SERPL-CCNC: 22 IU/L (ref 0–44)
AST SERPL-CCNC: 17 IU/L (ref 0–40)
BILIRUB DIRECT SERPL-MCNC: 0.13 MG/DL (ref 0–0.4)
BILIRUB SERPL-MCNC: 0.4 MG/DL (ref 0–1.2)
BUN SERPL-MCNC: 18 MG/DL (ref 8–27)
BUN/CREAT SERPL: 23 (ref 10–24)
CALCIUM SERPL-MCNC: 9.6 MG/DL (ref 8.6–10.2)
CHLORIDE SERPL-SCNC: 103 MMOL/L (ref 96–106)
CO2 SERPL-SCNC: 24 MMOL/L (ref 20–29)
CREAT SERPL-MCNC: 0.8 MG/DL (ref 0.76–1.27)
ERYTHROCYTE [DISTWIDTH] IN BLOOD BY AUTOMATED COUNT: 13.3 % (ref 11.6–15.4)
GLUCOSE SERPL-MCNC: 88 MG/DL (ref 65–99)
HBA1C MFR BLD: 6.1 % (ref 4.8–5.6)
HCT VFR BLD AUTO: 49.8 % (ref 37.5–51)
HGB BLD-MCNC: 16.6 G/DL (ref 13–17.7)
MCH RBC QN AUTO: 30.3 PG (ref 26.6–33)
MCHC RBC AUTO-ENTMCNC: 33.3 G/DL (ref 31.5–35.7)
MCV RBC AUTO: 91 FL (ref 79–97)
PLATELET # BLD AUTO: 376 X10E3/UL (ref 150–450)
POTASSIUM SERPL-SCNC: 4.7 MMOL/L (ref 3.5–5.2)
PROT SERPL-MCNC: 6.5 G/DL (ref 6–8.5)
RBC # BLD AUTO: 5.47 X10E6/UL (ref 4.14–5.8)
SODIUM SERPL-SCNC: 142 MMOL/L (ref 134–144)
TSH SERPL DL<=0.005 MIU/L-ACNC: 1.48 UIU/ML (ref 0.45–4.5)
WBC # BLD AUTO: 10.7 X10E3/UL (ref 3.4–10.8)

## 2020-07-22 DIAGNOSIS — E78.2 MIXED HYPERLIPIDEMIA: ICD-10-CM

## 2020-07-23 RX ORDER — ATORVASTATIN CALCIUM 10 MG/1
TABLET, FILM COATED ORAL
Qty: 90 TAB | Refills: 0 | Status: SHIPPED | OUTPATIENT
Start: 2020-07-23 | End: 2020-10-01

## 2020-10-01 DIAGNOSIS — E78.2 MIXED HYPERLIPIDEMIA: ICD-10-CM

## 2020-10-01 RX ORDER — ATORVASTATIN CALCIUM 10 MG/1
TABLET, FILM COATED ORAL
Qty: 90 TAB | Refills: 0 | Status: SHIPPED | OUTPATIENT
Start: 2020-10-01 | End: 2020-12-31

## 2020-12-09 DIAGNOSIS — I10 ESSENTIAL HYPERTENSION: ICD-10-CM

## 2020-12-10 RX ORDER — AMLODIPINE BESYLATE 5 MG/1
TABLET ORAL
Qty: 30 TAB | Refills: 0 | Status: SHIPPED | OUTPATIENT
Start: 2020-12-10 | End: 2021-01-07 | Stop reason: SDUPTHER

## 2020-12-10 NOTE — TELEPHONE ENCOUNTER
Pt Last seen >6 months ago  Med refill 1 month until f/u  pls call for F/u appointment    thx    Maye Campos MD  4/3/2018

## 2020-12-19 DIAGNOSIS — E78.2 MIXED HYPERLIPIDEMIA: ICD-10-CM

## 2020-12-31 RX ORDER — ATORVASTATIN CALCIUM 10 MG/1
TABLET, FILM COATED ORAL
Qty: 30 TAB | Refills: 0 | Status: SHIPPED | OUTPATIENT
Start: 2020-12-31 | End: 2021-01-07 | Stop reason: SDUPTHER

## 2021-01-07 ENCOUNTER — HOSPITAL ENCOUNTER (OUTPATIENT)
Dept: LAB | Age: 61
Discharge: HOME | End: 2021-01-07
Payer: MEDICARE

## 2021-01-07 ENCOUNTER — OFFICE VISIT (OUTPATIENT)
Dept: FAMILY MEDICINE CLINIC | Age: 61
End: 2021-01-07
Payer: MEDICARE

## 2021-01-07 VITALS
SYSTOLIC BLOOD PRESSURE: 139 MMHG | DIASTOLIC BLOOD PRESSURE: 80 MMHG | OXYGEN SATURATION: 95 % | TEMPERATURE: 97.1 F | WEIGHT: 225.2 LBS | HEIGHT: 72 IN | BODY MASS INDEX: 30.5 KG/M2 | HEART RATE: 73 BPM | RESPIRATION RATE: 16 BRPM

## 2021-01-07 DIAGNOSIS — Z71.6 ENCOUNTER FOR SMOKING CESSATION COUNSELING: ICD-10-CM

## 2021-01-07 DIAGNOSIS — I10 ESSENTIAL HYPERTENSION: Primary | ICD-10-CM

## 2021-01-07 DIAGNOSIS — Z79.899 ENCOUNTER FOR LONG-TERM (CURRENT) USE OF MEDICATIONS: ICD-10-CM

## 2021-01-07 DIAGNOSIS — E78.2 MIXED HYPERLIPIDEMIA: ICD-10-CM

## 2021-01-07 DIAGNOSIS — R73.03 PREDIABETES: ICD-10-CM

## 2021-01-07 PROCEDURE — 99213 OFFICE O/P EST LOW 20 MIN: CPT | Performed by: FAMILY MEDICINE

## 2021-01-07 PROCEDURE — G0463 HOSPITAL OUTPT CLINIC VISIT: HCPCS | Performed by: FAMILY MEDICINE

## 2021-01-07 PROCEDURE — G8752 SYS BP LESS 140: HCPCS | Performed by: FAMILY MEDICINE

## 2021-01-07 PROCEDURE — G8754 DIAS BP LESS 90: HCPCS | Performed by: FAMILY MEDICINE

## 2021-01-07 PROCEDURE — G8417 CALC BMI ABV UP PARAM F/U: HCPCS | Performed by: FAMILY MEDICINE

## 2021-01-07 PROCEDURE — 80061 LIPID PANEL: CPT

## 2021-01-07 PROCEDURE — 83036 HEMOGLOBIN GLYCOSYLATED A1C: CPT

## 2021-01-07 PROCEDURE — 80053 COMPREHEN METABOLIC PANEL: CPT

## 2021-01-07 PROCEDURE — G8427 DOCREV CUR MEDS BY ELIG CLIN: HCPCS | Performed by: FAMILY MEDICINE

## 2021-01-07 PROCEDURE — 36415 COLL VENOUS BLD VENIPUNCTURE: CPT

## 2021-01-07 PROCEDURE — G8510 SCR DEP NEG, NO PLAN REQD: HCPCS | Performed by: FAMILY MEDICINE

## 2021-01-07 PROCEDURE — 3017F COLORECTAL CA SCREEN DOC REV: CPT | Performed by: FAMILY MEDICINE

## 2021-01-07 RX ORDER — ATORVASTATIN CALCIUM 10 MG/1
10 TABLET, FILM COATED ORAL DAILY
Qty: 90 TAB | Refills: 1 | Status: SHIPPED | OUTPATIENT
Start: 2021-01-07 | End: 2021-04-08 | Stop reason: SDUPTHER

## 2021-01-07 RX ORDER — AMLODIPINE BESYLATE 5 MG/1
TABLET ORAL
Qty: 90 TAB | Refills: 1 | Status: SHIPPED | OUTPATIENT
Start: 2021-01-07 | End: 2021-04-08 | Stop reason: SDUPTHER

## 2021-01-07 NOTE — PROGRESS NOTES
Chief Complaint   Patient presents with    Hypertension    Cholesterol Problem     5 mo check    1. Have you been to the ER, urgent care clinic since your last visit? Hospitalized since your last visit? no    2. Have you seen or consulted any other health care providers outside of the [address] [place] [address] since your last visit? Include any pap smears or colon screening.  no

## 2021-01-07 NOTE — PROGRESS NOTES
Chuck Rashid is a 63 y.o. male    chronically noncompliant. Last few times I saw him was about 7 months in between. His wife passed 5/2020 due to a heart attack and also had a Stroke. Emotionally he's doing better. Have a brother in [place],   He is still doing yard work, finding things to enjoy. Sleep is OK.         has a past medical history of Borderline diabetes, Bradycardia (06/2019), Chronic pain, Colitis (12/3/2018), Degenerative disc disease, lumbar, Essential hypertension (12/3/2018), GERD (gastroesophageal reflux disease), H/O hernia repair (12/11/2018), History of cholecystectomy (12/11/2018), Ill-defined condition, Ill-defined condition, Osteoarthritis of spine with radiculopathy, lumbar region (12/11/2018), and Smoker (12/11/2018). HTN: BP at goal today. continue amlodipine     HLD: Trigly 175 and . Hx of HTN, Smoker, age 60, Male and also borderline diabetes. Last visit we started him on Lipitor 10mg.      LUTS: report no longer have issue, not taking meds.      Prediabetes: a1C 6.1% 06/2020, 6.1% 12/2019, 6.2% 05/2019, discussed pathophysiology and risk, complication. For now he want to try with diet and weight loss, we'll recheck in 3 months. He denies CP, SOB.      Smoker. 40+ packs years. Discuss cessation. He never started wellbutrin      He saw cardiology dr. Collette Fogo last 06/2019     BMI 30: Discussed the patient's BMI with him. The BMI follow up plan is as follows:   dietary management education, guidance, and counseling encourage exercise monitor weight prescribed dietary intake     Have seen 2 orthopedics, have had 2 more injection to his back since our last visit. I've started him on Lyrica, and ortho have raised to to 100mg and 150mg and it didn't help, he stopped taking it 1 week ago. Lumbar DJD hx. Lennox Yasirano about 30 injection in his spines\".  But have headache afterward. Albino Espinoza supposed to have surgery 6-7 years ago.    Sciatica on right side.  Denies loss of bowel or bladder, saddle anesthesia or other redflags. Have been taking ibuprofen 1200-1600mg daily for the past 2 weeks b/c of back pain and muscle spasm. Says he have tried gabapentin and it didn't help, and he doesn't want to bother with it. Currently managed by orthopedic      Colonoscopy 12/2018 showed 2 polyps, next due 5 years.      Allergies to aspirin       Reviewed: active problem list, medication list, allergies, notes from last encounter, lab results    A comprehensive review of systems was negative except for that written in the HPI. Allergies   Allergen Reactions    Aspirin Other (comments)     Nose bleeds    Morphine Other (comments)     Agitation     Current Outpatient Medications on File Prior to Visit   Medication Sig Dispense Refill    ibuprofen (MOTRIN) 200 mg tablet Take 600 mg by mouth every eight (8) hours as needed for Pain. No current facility-administered medications on file prior to visit. Patient Active Problem List   Diagnosis Code    Colitis K52.9    Essential hypertension I10    Osteoarthritis of spine with radiculopathy, lumbar region M47.26    Smoker F17.200    History of cholecystectomy Z90.49    H/O hernia repair Z98.890, Z87.19       Visit Vitals  /80   Pulse 73   Temp 97.1 °F (36.2 °C) (Temporal)   Resp 16   Ht 6' (1.829 m)   Wt 225 lb 3.2 oz (102.2 kg)   SpO2 95%   BMI 30.54 kg/m²     General appearance: alert, cooperative, no distress, appears stated age  Neurologic: Alert and oriented X 3, normal strength and tone, symmetric. Normal without focal findings. Cranial nerves 2-12 intact. Normal coordination and gait. Mental status: Alert, oriented, thought content appropriate, affect: stable, mood-congruent. Head: Normocephalic, without obvious abnormality, atraumatic  Eyes: conjunctivae/corneas clear. PERRL, EOM's intact.    Neck: supple, symmetrical, trachea midline, no JVD  Lungs: clear to auscultation bilaterally  Heart: regular rate and rhythm, S1, S2 normal, no murmur, click, rub or gallop  Abdomen: soft, non-tender. Extremities: extremities normal, atraumatic, no cyanosis or edema      Assessment/Plans:    Diagnoses and all orders for this visit:    1. Essential hypertension  -     amLODIPine (NORVASC) 5 mg tablet; TAKE 1 TABLET EVERY DAY  -     METABOLIC PANEL, COMPREHENSIVE    2. Mixed hyperlipidemia  -     atorvastatin (LIPITOR) 10 mg tablet; Take 1 Tab by mouth daily.  -     LIPID PANEL    3. Prediabetes  -     HEMOGLOBIN A1C W/O EAG    4. Encounter for smoking cessation counseling    5. Encounter for long-term (current) use of medications  -     METABOLIC PANEL, COMPREHENSIVE  -     LIPID PANEL  -     HEMOGLOBIN A1C W/O EAG      Discussed plans, risk/benefits of treatments/observations. Through the use of shared decision making, above plans were agreed upon. Medication compliance advised. Patient verbalized understanding. Follow-up and Dispositions    · Return in about 6 months (around 7/7/2021) for HTN, HLD.          Van Boyle MD  1/7/2021

## 2021-01-08 LAB
ALBUMIN SERPL-MCNC: 4.9 G/DL (ref 3.8–4.8)
ALBUMIN/GLOB SERPL: 2 {RATIO} (ref 1.2–2.2)
ALP SERPL-CCNC: 69 IU/L (ref 39–117)
ALT SERPL-CCNC: 47 IU/L (ref 0–44)
AST SERPL-CCNC: 37 IU/L (ref 0–40)
BILIRUB SERPL-MCNC: 0.4 MG/DL (ref 0–1.2)
BUN SERPL-MCNC: 13 MG/DL (ref 8–27)
BUN/CREAT SERPL: 15 (ref 10–24)
CALCIUM SERPL-MCNC: 10.2 MG/DL (ref 8.6–10.2)
CHLORIDE SERPL-SCNC: 104 MMOL/L (ref 96–106)
CHOLEST SERPL-MCNC: 143 MG/DL (ref 100–199)
CO2 SERPL-SCNC: 24 MMOL/L (ref 20–29)
CREAT SERPL-MCNC: 0.87 MG/DL (ref 0.76–1.27)
GLOBULIN SER CALC-MCNC: 2.5 G/DL (ref 1.5–4.5)
GLUCOSE SERPL-MCNC: 98 MG/DL (ref 65–99)
HBA1C MFR BLD: 6.5 % (ref 4.8–5.6)
HDLC SERPL-MCNC: 44 MG/DL
LDLC SERPL CALC-MCNC: 78 MG/DL (ref 0–99)
POTASSIUM SERPL-SCNC: 4.9 MMOL/L (ref 3.5–5.2)
PROT SERPL-MCNC: 7.4 G/DL (ref 6–8.5)
SODIUM SERPL-SCNC: 142 MMOL/L (ref 134–144)
TRIGL SERPL-MCNC: 119 MG/DL (ref 0–149)
VLDLC SERPL CALC-MCNC: 21 MG/DL (ref 5–40)

## 2021-03-11 ENCOUNTER — TELEPHONE (OUTPATIENT)
Dept: FAMILY MEDICINE CLINIC | Age: 61
End: 2021-03-11

## 2021-03-11 NOTE — TELEPHONE ENCOUNTER
----- Message from [facility] sent at 3/11/2021  9:32 AM EST -----  Regarding: MD Maharaj/Telephone  Patient return call    Caller's first and last name and relationship (if not the patient): N/A      Best contact number(s): [phone]      Whose call is being returned: N/A      Details to clarify the request: Missed a call from pcp stating that he wanted the patient to come back in to the office.        Liz Weiss

## 2021-03-22 ENCOUNTER — OFFICE VISIT (OUTPATIENT)
Dept: FAMILY MEDICINE CLINIC | Age: 61
End: 2021-03-22
Payer: MEDICARE

## 2021-03-22 VITALS
RESPIRATION RATE: 16 BRPM | TEMPERATURE: 97.8 F | BODY MASS INDEX: 30.58 KG/M2 | HEART RATE: 70 BPM | DIASTOLIC BLOOD PRESSURE: 86 MMHG | WEIGHT: 225.8 LBS | OXYGEN SATURATION: 92 % | HEIGHT: 72 IN | SYSTOLIC BLOOD PRESSURE: 125 MMHG

## 2021-03-22 DIAGNOSIS — E78.2 MIXED HYPERLIPIDEMIA: ICD-10-CM

## 2021-03-22 DIAGNOSIS — Z79.899 ENCOUNTER FOR LONG-TERM (CURRENT) USE OF MEDICATIONS: ICD-10-CM

## 2021-03-22 DIAGNOSIS — R73.03 PREDIABETES: ICD-10-CM

## 2021-03-22 DIAGNOSIS — I10 ESSENTIAL HYPERTENSION: Primary | ICD-10-CM

## 2021-03-22 PROCEDURE — 99214 OFFICE O/P EST MOD 30 MIN: CPT | Performed by: FAMILY MEDICINE

## 2021-03-22 PROCEDURE — G0463 HOSPITAL OUTPT CLINIC VISIT: HCPCS | Performed by: FAMILY MEDICINE

## 2021-03-22 PROCEDURE — G8427 DOCREV CUR MEDS BY ELIG CLIN: HCPCS | Performed by: FAMILY MEDICINE

## 2021-03-22 PROCEDURE — G8417 CALC BMI ABV UP PARAM F/U: HCPCS | Performed by: FAMILY MEDICINE

## 2021-03-22 PROCEDURE — G8752 SYS BP LESS 140: HCPCS | Performed by: FAMILY MEDICINE

## 2021-03-22 PROCEDURE — G8754 DIAS BP LESS 90: HCPCS | Performed by: FAMILY MEDICINE

## 2021-03-22 PROCEDURE — 3017F COLORECTAL CA SCREEN DOC REV: CPT | Performed by: FAMILY MEDICINE

## 2021-03-22 PROCEDURE — G8432 DEP SCR NOT DOC, RNG: HCPCS | Performed by: FAMILY MEDICINE

## 2021-03-22 NOTE — PROGRESS NOTES
Chief Complaint   Patient presents with    Results     labs review        1. Have you been to the ER, urgent care clinic since your last visit? Hospitalized since your last visit? No    2. Have you seen or consulted any other health care providers outside of the [address] [place] [address] since your last visit? Include any pap smears or colon screening.  No

## 2021-03-22 NOTE — PROGRESS NOTES
Porter Shaikh is a 57 y.o. male    chronically noncompliant. His wife passed 5/2020 due to a heart attack and also had a Stroke. Emotionally he's doing better. Have a brother in [place],   He is still doing yard work, finding things to enjoy. Sleep is OK.      Pt on disability     has a past medical history of Borderline diabetes, Bradycardia (06/2019), Chronic pain, Colitis (12/3/2018), Degenerative disc disease, lumbar, Essential hypertension (12/3/2018), GERD (gastroesophageal reflux disease), H/O hernia repair (12/11/2018), History of cholecystectomy (12/11/2018), Ill-defined condition, Ill-defined condition, Osteoarthritis of spine with radiculopathy, lumbar region (12/11/2018), and Smoker (12/11/2018). HTN: BP at goal today. continue amlodipine     HLD: Trigly 175 and . Hx of HTN, Smoker, age 60, Male and also borderline diabetes. Tolerating Lipitor 10mg.      LUTS: report no longer have issue, not taking meds.      Prediabetes: a1C 6.5% 1/2021, 6.1% 06/2020, 6.1% 12/2019, 6.2% 05/2019, discussed pathophysiology and risk, complication. For now he want to try with diet and weight loss, we'll recheck in 3 months. We discussed pathophysiology, complication, risk and treatment. Well repeat test and f/u     Smoker. 40+ packs years. Discuss cessation. He never started wellbutrin      He saw cardiology dr. Ignacio Vallejo last 06/2019     BMI 30: Discussed the patient's BMI with him. The BMI follow up plan is as follows:   dietary management education, guidance, and counseling encourage exercise monitor weight prescribed dietary intake     Have seen 2 orthopedics, have had 2 more injection to his back since our last visit. I've started him on Lyrica, and ortho have raised to to 100mg and 150mg and it didn't help, he stopped taking it 1 week ago.    Lumbar DJD hx. Renetta Flor about 30 injection in his spines\".  But have headache afterward. Gumaro Barnes supposed to have surgery 6-7 years ago.   Sciatica on right side.  Denies loss of bowel or bladder, saddle anesthesia or other redflags. Have been taking ibuprofen 1200-1600mg daily for the past 2 weeks b/c of back pain and muscle spasm. Says he have tried gabapentin and it didn't help, and he doesn't want to bother with it. Currently managed by orthopedic      Colonoscopy 12/2018 showed 2 polyps, next due 5 years.      Allergies to aspirin       Reviewed: active problem list, medication list, allergies, notes from last encounter, lab results    A comprehensive review of systems was negative except for that written in the HPI. Allergies   Allergen Reactions    Aspirin Other (comments)     Nose bleeds    Morphine Other (comments)     Agitation     Current Outpatient Medications on File Prior to Visit   Medication Sig Dispense Refill    atorvastatin (LIPITOR) 10 mg tablet Take 1 Tab by mouth daily. 90 Tab 1    amLODIPine (NORVASC) 5 mg tablet TAKE 1 TABLET EVERY DAY 90 Tab 1    ibuprofen (MOTRIN) 200 mg tablet Take 600 mg by mouth every eight (8) hours as needed for Pain. No current facility-administered medications on file prior to visit. Patient Active Problem List   Diagnosis Code    Colitis K52.9    Essential hypertension I10    Osteoarthritis of spine with radiculopathy, lumbar region M47.26    Smoker F17.200    History of cholecystectomy Z90.49    H/O hernia repair Z98.890, Z87.19       Visit Vitals  /86 (BP 1 Location: Left upper arm, BP Patient Position: Sitting, BP Cuff Size: Adult)   Pulse 70   Temp 97.8 °F (36.6 °C) (Temporal)   Resp 16   Ht 6' (1.829 m)   Wt 225 lb 12.8 oz (102.4 kg)   SpO2 92%   BMI 30.62 kg/m²     General appearance: alert, cooperative, no distress, appears stated age  Neurologic: Alert and oriented X 3, normal strength and tone, symmetric. Normal without focal findings. Cranial nerves 2-12 intact. Normal coordination and gait.   Mental status: Alert, oriented, thought content appropriate, affect: stable, mood-congruent. Head: Normocephalic, without obvious abnormality, atraumatic  Eyes: conjunctivae/corneas clear. PERRL, EOM's intact. Neck: supple, symmetrical, trachea midline, no JVD  Lungs: clear to auscultation bilaterally  Heart: regular rate and rhythm, S1, S2 normal, no murmur, click, rub or gallop  Abdomen: soft, non-tender. Extremities: extremities normal, atraumatic, no cyanosis or edema      Assessment/Plans:    Diagnoses and all orders for this visit:    1. Essential hypertension  -     METABOLIC PANEL, COMPREHENSIVE; Future    2. Mixed hyperlipidemia  -     METABOLIC PANEL, COMPREHENSIVE; Future    3. Prediabetes  -     HEMOGLOBIN A1C WITH EAG; Future    4. Encounter for long-term (current) use of medications  -     METABOLIC PANEL, COMPREHENSIVE; Future  -     HEMOGLOBIN A1C WITH EAG; Future      Discussed plans, risk/benefits of treatments/observations. Through the use of shared decision making, above plans were agreed upon. Medication compliance advised. Patient verbalized understanding. Follow-up and Dispositions    · Return in about 4 weeks (around 4/19/2021) for Prediabetes.          Anthony Olvera MD  3/22/2021

## 2021-03-25 LAB
ALBUMIN SERPL-MCNC: 4.6 G/DL (ref 3.8–4.8)
ALBUMIN/GLOB SERPL: 2.1 {RATIO} (ref 1.2–2.2)
ALP SERPL-CCNC: 69 IU/L (ref 39–117)
ALT SERPL-CCNC: 36 IU/L (ref 0–44)
AST SERPL-CCNC: 29 IU/L (ref 0–40)
BILIRUB SERPL-MCNC: 0.5 MG/DL (ref 0–1.2)
BUN SERPL-MCNC: 20 MG/DL (ref 8–27)
BUN/CREAT SERPL: 19 (ref 10–24)
CALCIUM SERPL-MCNC: 10 MG/DL (ref 8.6–10.2)
CHLORIDE SERPL-SCNC: 105 MMOL/L (ref 96–106)
CO2 SERPL-SCNC: 19 MMOL/L (ref 20–29)
CREAT SERPL-MCNC: 1.05 MG/DL (ref 0.76–1.27)
EST. AVERAGE GLUCOSE BLD GHB EST-MCNC: 146 MG/DL
GLOBULIN SER CALC-MCNC: 2.2 G/DL (ref 1.5–4.5)
GLUCOSE SERPL-MCNC: 109 MG/DL (ref 65–99)
HBA1C MFR BLD: 6.7 % (ref 4.8–5.6)
POTASSIUM SERPL-SCNC: 5 MMOL/L (ref 3.5–5.2)
PROT SERPL-MCNC: 6.8 G/DL (ref 6–8.5)
SODIUM SERPL-SCNC: 143 MMOL/L (ref 134–144)

## 2021-04-08 DIAGNOSIS — E78.2 MIXED HYPERLIPIDEMIA: ICD-10-CM

## 2021-04-08 DIAGNOSIS — I10 ESSENTIAL HYPERTENSION: ICD-10-CM

## 2021-04-08 RX ORDER — ATORVASTATIN CALCIUM 10 MG/1
10 TABLET, FILM COATED ORAL DAILY
Qty: 90 TAB | Refills: 1 | Status: SHIPPED | OUTPATIENT
Start: 2021-04-08 | End: 2021-09-29 | Stop reason: SDUPTHER

## 2021-04-08 RX ORDER — AMLODIPINE BESYLATE 5 MG/1
TABLET ORAL
Qty: 90 TAB | Refills: 1 | Status: SHIPPED | OUTPATIENT
Start: 2021-04-08 | End: 2021-09-29 | Stop reason: SDUPTHER

## 2021-04-19 ENCOUNTER — OFFICE VISIT (OUTPATIENT)
Dept: FAMILY MEDICINE CLINIC | Age: 61
End: 2021-04-19
Payer: MEDICARE

## 2021-04-19 VITALS
OXYGEN SATURATION: 96 % | HEART RATE: 76 BPM | TEMPERATURE: 98.2 F | BODY MASS INDEX: 29.82 KG/M2 | DIASTOLIC BLOOD PRESSURE: 77 MMHG | WEIGHT: 220.2 LBS | SYSTOLIC BLOOD PRESSURE: 124 MMHG | RESPIRATION RATE: 18 BRPM | HEIGHT: 72 IN

## 2021-04-19 DIAGNOSIS — E11.9 NEWLY DIAGNOSED DIABETES (HCC): ICD-10-CM

## 2021-04-19 DIAGNOSIS — E11.9 CONTROLLED TYPE 2 DIABETES MELLITUS WITHOUT COMPLICATION, WITHOUT LONG-TERM CURRENT USE OF INSULIN (HCC): Primary | ICD-10-CM

## 2021-04-19 PROCEDURE — 3017F COLORECTAL CA SCREEN DOC REV: CPT | Performed by: FAMILY MEDICINE

## 2021-04-19 PROCEDURE — G8752 SYS BP LESS 140: HCPCS | Performed by: FAMILY MEDICINE

## 2021-04-19 PROCEDURE — G0463 HOSPITAL OUTPT CLINIC VISIT: HCPCS | Performed by: FAMILY MEDICINE

## 2021-04-19 PROCEDURE — G8754 DIAS BP LESS 90: HCPCS | Performed by: FAMILY MEDICINE

## 2021-04-19 PROCEDURE — G8510 SCR DEP NEG, NO PLAN REQD: HCPCS | Performed by: FAMILY MEDICINE

## 2021-04-19 PROCEDURE — 3044F HG A1C LEVEL LT 7.0%: CPT | Performed by: FAMILY MEDICINE

## 2021-04-19 PROCEDURE — G8427 DOCREV CUR MEDS BY ELIG CLIN: HCPCS | Performed by: FAMILY MEDICINE

## 2021-04-19 PROCEDURE — 99214 OFFICE O/P EST MOD 30 MIN: CPT | Performed by: FAMILY MEDICINE

## 2021-04-19 PROCEDURE — 2022F DILAT RTA XM EVC RTNOPTHY: CPT | Performed by: FAMILY MEDICINE

## 2021-04-19 PROCEDURE — G8417 CALC BMI ABV UP PARAM F/U: HCPCS | Performed by: FAMILY MEDICINE

## 2021-04-19 RX ORDER — LANCETS
EACH MISCELLANEOUS
Qty: 100 EACH | Refills: 1 | Status: SHIPPED | OUTPATIENT
Start: 2021-04-19

## 2021-04-19 RX ORDER — INSULIN PUMP SYRINGE, 3 ML
EACH MISCELLANEOUS
Qty: 1 KIT | Refills: 0 | Status: SHIPPED | OUTPATIENT
Start: 2021-04-19 | End: 2021-09-29 | Stop reason: ALTCHOICE

## 2021-04-19 RX ORDER — METFORMIN HYDROCHLORIDE 500 MG/1
500 TABLET ORAL 2 TIMES DAILY WITH MEALS
Qty: 60 TAB | Refills: 1 | Status: SHIPPED | OUTPATIENT
Start: 2021-04-19 | End: 2021-06-01

## 2021-04-19 NOTE — PROGRESS NOTES
Chief Complaint   Patient presents with    Results     4 week check    1. Have you been to the ER, urgent care clinic since your last visit? Hospitalized since your last visit? No     2. Have you seen or consulted any other health care providers outside of the [address] [place] [address] since your last visit? Include any pap smears or colon screening.  No

## 2021-04-19 NOTE — PROGRESS NOTES
Reba Silver is a 57 y.o. male    His wife passed 5/2020 due to a heart attack and also had a Stroke. Emotionally he's doing better. Have a brother in [place],   He is still doing yard work, finding things to enjoy. Sleep is OK.      Pt on disability     has a past medical history of Borderline diabetes, Bradycardia (06/2019), Chronic pain, Colitis (12/3/2018), Degenerative disc disease, lumbar, Essential hypertension (12/3/2018), GERD (gastroesophageal reflux disease), H/O hernia repair (12/11/2018), History of cholecystectomy (12/11/2018), Ill-defined condition, Ill-defined condition, Osteoarthritis of spine with radiculopathy, lumbar region (12/11/2018), and Smoker (12/11/2018).     DM2: a1C 6.7% 4/2021, 6.5% 1/2021, 6.1% 06/2020, 6.1% 12/2019, 6.2% 05/2019, discussed pathophysiology and risk, complication. For now he want to try with diet and weight loss, we'll recheck in 3 months. He's been denying DM2  But a repeat A1C went up from 6.5% to 6.7%. He have cut out all sugar, have lost 5lbs. But his A1C still up. We discussed pathophysiology, complication, risk and treatment. Discussed blood glucose monitoring  Start metformin     Smoker. 40+ packs years. Discuss cessation. He never started wellbutrin        Reviewed: active problem list, medication list, allergies, notes from last encounter, lab results    A comprehensive review of systems was negative except for that written in the HPI. Allergies   Allergen Reactions    Aspirin Other (comments)     Nose bleeds    Morphine Other (comments)     Agitation     Current Outpatient Medications on File Prior to Visit   Medication Sig Dispense Refill    atorvastatin (LIPITOR) 10 mg tablet Take 1 Tab by mouth daily. 90 Tab 1    amLODIPine (NORVASC) 5 mg tablet TAKE 1 TABLET EVERY DAY 90 Tab 1    ibuprofen (MOTRIN) 200 mg tablet Take 600 mg by mouth every eight (8) hours as needed for Pain.        No current facility-administered medications on file prior to visit. Patient Active Problem List   Diagnosis Code    Colitis K52.9    Essential hypertension I10    Osteoarthritis of spine with radiculopathy, lumbar region M47.26    Smoker F17.200    History of cholecystectomy Z90.49    H/O hernia repair Z98.890, Z87.19       Visit Vitals  /77   Pulse 76   Temp 98.2 °F (36.8 °C) (Temporal)   Resp 18   Ht 6' (1.829 m)   Wt 220 lb 3.2 oz (99.9 kg)   SpO2 96%   BMI 29.86 kg/m²     General appearance: alert, cooperative, no distress, appears stated age  Neurologic: Alert and oriented X 3, normal strength and tone, symmetric. Normal without focal findings. Cranial nerves 2-12 intact. Normal coordination and gait. Mental status: Alert, oriented, thought content appropriate, affect: stable, mood-congruent. Head: Normocephalic, without obvious abnormality, atraumatic  Eyes: conjunctivae/corneas clear. PERRL, EOM's intact. Neck: supple, symmetrical, trachea midline, no JVD  Lungs: clear to auscultation bilaterally  Heart: regular rate and rhythm, S1, S2 normal, no murmur, click, rub or gallop  Abdomen: soft, non-tender. Extremities: extremities normal, atraumatic, no cyanosis or edema      Assessment/Plans:    Diagnoses and all orders for this visit:    1. Controlled type 2 diabetes mellitus without complication, without long-term current use of insulin (HCC)  -     metFORMIN (GLUCOPHAGE) 500 mg tablet; Take 1 Tab by mouth two (2) times daily (with meals). -     Blood-Glucose Meter monitoring kit; Check once daily  -     lancets misc; Check once daily  -     glucose blood VI test strips (ASCENSIA AUTODISC VI, ONE TOUCH ULTRA TEST VI) strip; Check once daily    2. Newly diagnosed diabetes ([facility] [address]  -     metFORMIN (GLUCOPHAGE) 500 mg tablet; Take 1 Tab by mouth two (2) times daily (with meals). -     Blood-Glucose Meter monitoring kit; Check once daily  -     lancets misc;  Check once daily  -     glucose blood VI test strips (ASCENSIA AUTODISC VI, ONE TOUCH ULTRA TEST VI) strip; Check once daily      Discussed plans, risk/benefits of treatments/observations. Through the use of shared decision making, above plans were agreed upon. Medication compliance advised. Patient verbalized understanding. Follow-up and Dispositions    · Return in about 6 weeks (around 5/31/2021) for virtual apt.          Nino Serna MD  4/19/2021

## 2021-04-28 ENCOUNTER — TELEPHONE (OUTPATIENT)
Dept: FAMILY MEDICINE CLINIC | Age: 61
End: 2021-04-28

## 2021-04-28 NOTE — TELEPHONE ENCOUNTER
----- Message from Alex Austin sent at 4/28/2021 11:42 AM EDT -----  Regarding: Dr. Pete Higgins  General Message/Vendor Calls    Caller's first and last name: Patient      Reason for call: Rash on stomach reach to his side small red bumps itching and burning under the skin      Callback required yes/no and why: yes      Best contact number(s):[phone]      Details to clarify the request:      Alex Austin

## 2021-05-07 RX ORDER — BLOOD-GLUCOSE METER
EACH MISCELLANEOUS
Qty: 3 EACH | Refills: 1 | Status: SHIPPED | OUTPATIENT
Start: 2021-05-07 | End: 2021-09-29 | Stop reason: ALTCHOICE

## 2021-05-07 RX ORDER — INSULIN PUMP SYRINGE, 3 ML
EACH MISCELLANEOUS
Qty: 1 KIT | Refills: 0 | Status: SHIPPED | OUTPATIENT
Start: 2021-05-07 | End: 2021-09-29 | Stop reason: ALTCHOICE

## 2021-05-07 RX ORDER — LANCETS 33 GAUGE
EACH MISCELLANEOUS
Qty: 100 LANCET | Refills: 1 | Status: SHIPPED | OUTPATIENT
Start: 2021-05-07 | End: 2021-09-16

## 2021-05-07 RX ORDER — CALCIUM CITRATE/VITAMIN D3 200MG-6.25
TABLET ORAL
Qty: 100 STRIP | Refills: 1 | Status: SHIPPED | OUTPATIENT
Start: 2021-05-07 | End: 2021-09-16

## 2021-05-07 RX ORDER — ISOPROPYL ALCOHOL 70 ML/100ML
SWAB TOPICAL
Qty: 100 PAD | Refills: 1 | Status: SHIPPED | OUTPATIENT
Start: 2021-05-07 | End: 2021-09-16

## 2021-07-14 ENCOUNTER — TELEPHONE (OUTPATIENT)
Dept: FAMILY MEDICINE CLINIC | Age: 61
End: 2021-07-14

## 2021-07-14 NOTE — TELEPHONE ENCOUNTER
----- Message from [place] sent at 7/14/2021 10:17 AM EDT -----  Regarding: Dr. Chu Solis  General Message/Vendor Calls    Caller's first and last name:  Savannah Moreno      Reason for call:  Requesting a call back from a nurse.        Callback required yes/no and why:  yes      Best contact number(s):[phone]      Details to clarify the request:  Maxi Chávez Formerly [facility], [facility]

## 2021-07-15 ENCOUNTER — DOCUMENTATION ONLY (OUTPATIENT)
Dept: FAMILY MEDICINE CLINIC | Age: 61
End: 2021-07-15

## 2021-07-15 ENCOUNTER — VIRTUAL VISIT (OUTPATIENT)
Dept: FAMILY MEDICINE CLINIC | Age: 61
End: 2021-07-15
Payer: MEDICARE

## 2021-07-15 DIAGNOSIS — A59.9 TRICHOMONAL INFECTION: ICD-10-CM

## 2021-07-15 DIAGNOSIS — A64 STD (MALE): Primary | ICD-10-CM

## 2021-07-15 DIAGNOSIS — R76.8 HEPATITIS C ANTIBODY TEST POSITIVE: ICD-10-CM

## 2021-07-15 DIAGNOSIS — R76.8 HEPATITIS B ANTIBODY POSITIVE: ICD-10-CM

## 2021-07-15 DIAGNOSIS — E11.9 CONTROLLED TYPE 2 DIABETES MELLITUS WITHOUT COMPLICATION, WITHOUT LONG-TERM CURRENT USE OF INSULIN (HCC): ICD-10-CM

## 2021-07-15 PROCEDURE — G8427 DOCREV CUR MEDS BY ELIG CLIN: HCPCS | Performed by: FAMILY MEDICINE

## 2021-07-15 PROCEDURE — 2022F DILAT RTA XM EVC RTNOPTHY: CPT | Performed by: FAMILY MEDICINE

## 2021-07-15 PROCEDURE — G8417 CALC BMI ABV UP PARAM F/U: HCPCS | Performed by: FAMILY MEDICINE

## 2021-07-15 PROCEDURE — 99213 OFFICE O/P EST LOW 20 MIN: CPT | Performed by: FAMILY MEDICINE

## 2021-07-15 PROCEDURE — G8510 SCR DEP NEG, NO PLAN REQD: HCPCS | Performed by: FAMILY MEDICINE

## 2021-07-15 PROCEDURE — 3017F COLORECTAL CA SCREEN DOC REV: CPT | Performed by: FAMILY MEDICINE

## 2021-07-15 PROCEDURE — 3044F HG A1C LEVEL LT 7.0%: CPT | Performed by: FAMILY MEDICINE

## 2021-07-15 PROCEDURE — G8756 NO BP MEASURE DOC: HCPCS | Performed by: FAMILY MEDICINE

## 2021-07-15 RX ORDER — METRONIDAZOLE 500 MG/1
2000 TABLET ORAL ONCE
Qty: 4 TABLET | Refills: 0 | Status: SHIPPED | OUTPATIENT
Start: 2021-07-15 | End: 2021-07-15

## 2021-07-15 NOTE — PROGRESS NOTES
Chief Complaint   Patient presents with    Exposure to STD       1. Have you been to the ER, urgent care clinic since your last visit? Hospitalized since your last visit? No     2. Have you seen or consulted any other health care providers outside of the [address] [place] [address] since your last visit? Include any pap smears or colon screening.  No

## 2021-07-15 NOTE — PROGRESS NOTES
Consent: Hossien Maharaj, who was seen by synchronous (real-time) audio-video technology, and/or his healthcare decision maker, is aware that this patient-initiated, Telehealth encounter on 7/15/2021 is a billable service, with coverage as determined by his insurance carrier. He is aware that he may receive a bill and has provided verbal consent to proceed: Yes. Hossein Maharaj is a 57 y.o. male       has a past medical history of Borderline diabetes, Bradycardia (06/2019), Chronic pain, Colitis (12/3/2018), Degenerative disc disease, lumbar, Essential hypertension (12/3/2018), GERD (gastroesophageal reflux disease), H/O hernia repair (12/11/2018), History of cholecystectomy (12/11/2018), Ill-defined condition, Ill-defined condition, Osteoarthritis of spine with radiculopathy, lumbar region (12/11/2018), and Smoker (12/11/2018). Recently been with a lady last 3 weeks ago. She was positive for Trichomonas. Denies rash, penile discharge, dysuria, or pain. Denies fever, chills abdo pain. Hx of positive hep b ab and hep c ab. Per pt. DM2: a1C 6.7% 4/2021, 6.5% 1/2021, 6.1% 06/2020, 6.1% 12/2019, 6.2% 05/2019  Discussed pathophysiology and risk, complication.   He's been denying DM2, refusing meds  But a repeat A1C went up from 6.5% to 6.7%. He have cut out all sugar, have lost 5lbs. But his A1C still up. Discussed blood glucose monitoring  Last visit started metformin    He took it for about 2-3 weeks and notice his BG in the 80s. But since he's been home again his diet inreased and his bg this morning b/f brkf as 120. He's cutting out starches. We'll leave it for him to use metformin 1/2 BID or hold it prn.      Smoker. 40+ packs years. Discuss cessation. He never started wellbutrin       Reviewed: active problem list, medication list, allergies, notes from last encounter, lab results    A comprehensive review of systems was negative except for that written in the HPI.       Assessment & Plan:   Diagnoses and all orders for this visit:    1. STD (male)  -     metroNIDAZOLE (FLAGYL) 500 mg tablet; Take 4 Tablets by mouth once for 1 dose. -     HIV 1/2 AG/AB, 4TH GENERATION,W RFLX CONFIRM; Future  -     CT+NG+M GENITALIUM BY BENITO, UR; Future  -     HEPATITIS PANEL, ACUTE; Future    2. Trichomonal infection  -     metroNIDAZOLE (FLAGYL) 500 mg tablet; Take 4 Tablets by mouth once for 1 dose. 3. Hepatitis B antibody positive  -     HEPATITIS PANEL, ACUTE; Future    4. Hepatitis C antibody test positive  -     HEPATITIS PANEL, ACUTE; Future    5. Controlled type 2 diabetes mellitus without complication, without long-term current use of insulin (HCC)      Follow-up and Dispositions    · Return in about 2 months (around 9/15/2021) for DM2, HTN, meds, sooner if labs abnormal .       712  Subjective:   Alix Nihcols is a 57 y.o. male who was seen for Exposure to STD      Prior to Admission medications    Medication Sig Start Date End Date Taking? Authorizing Provider   metroNIDAZOLE (FLAGYL) 500 mg tablet Take 4 Tablets by mouth once for 1 dose.  7/15/21 7/15/21 Yes Francisco Maharaj MD   Blood-Glucose Meter (True Metrix Air Glucose Meter) monitoring kit Test Once Daily 5/7/21  Yes Francisco Maharaj MD   glucose blood VI test strips (True Metrix Glucose Test Strip) strip Test Once Daily 5/7/21  Yes Francisco Maharaj MD   lancets (TRUEplus Lancets) 33 gauge misc Test Once Daily 5/7/21  Yes Talia Grove MD   alcohol swabs (BD Single Use Swabs Regular) padm Test Once Daily 5/7/21  Yes Francisco Maharaj MD   Blood Glucose Control, Low (True Metrix Level 1) soln Test per directions of meter 5/7/21  Yes Talia Grove MD   Blood-Glucose Meter monitoring kit Check once daily 4/19/21  Yes Talia Grove MD   lancets misc Check once daily 4/19/21  Yes Francisco Maharaj MD   glucose blood VI test strips (ASCENSIA AUTODISC VI, ONE TOUCH ULTRA TEST VI) strip Check once daily 4/19/21  Yes Talia Grove MD   atorvastatin (LIPITOR) 10 mg tablet Take 1 Tab by mouth daily. 4/8/21  Yes Talia Grove MD   amLODIPine (NORVASC) 5 mg tablet TAKE 1 TABLET EVERY DAY 4/8/21  Yes Francisco Maharaj MD   ibuprofen (MOTRIN) 200 mg tablet Take 600 mg by mouth every eight (8) hours as needed for Pain. Yes Provider, Historical   metFORMIN (GLUCOPHAGE) 500 mg tablet TAKE 1 TABLET TWICE DAILY  WITH  MEALS  Patient not taking: Reported on 7/15/2021 6/1/21   Talia Grove MD     Allergies   Allergen Reactions    Aspirin Other (comments)     Nose bleeds    Morphine Other (comments)     Agitation         Objective:   Vital Signs: (As obtained by patient/caregiver at home)  There were no vitals taken for this visit. Constitutional: [x] Appears well-developed and well-nourished [x] No apparent distress        Mental status: [x] Alert and awake  [x] Oriented to person/place/time [x] Able to follow commands      Eyes:   EOM    [x]  Normal      Sclera  [x]  Normal              Discharge [x]  None visible       HENT: [x] Normocephalic, atraumatic    [] Mouth/Throat: Mucous membranes are moist    External Ears [x] Normal      Neck: [x] No visualized mass     Pulmonary/Chest: [x] Respiratory effort normal   [x] No visualized signs of difficulty breathing or respiratory distress           Musculoskeletal:   [x] Normal gait with no signs of ataxia         [x] Normal range of motion of neck         Neurological:        [x] No Facial Asymmetry (Cranial nerve 7 motor function) (limited exam due to video visit)          [x] No gaze palsy              Skin:        [x] No significant exanthematous lesions or discoloration noted on facial skin                  Psychiatric:       [x] Normal Affect [] Abnormal -        [x] No Hallucinations      We discussed the expected course, resolution and complications of the diagnosis(es) in detail. Medication risks, benefits, costs, interactions, and alternatives were discussed as indicated.   I advised him to contact the office if his condition worsens, changes or fails to improve as anticipated. He expressed understanding with the diagnosis(es) and plan. Hossein Maharaj is a 57 y.o. male being evaluated by a video visit encounter for concerns as above. A caregiver was present when appropriate. Due to this being a TeleHealth encounter ([facility]), evaluation of the following organ systems was limited: Vitals/Constitutional/EENT/Resp/CV/GI//MS/Neuro/Skin/Heme-Lymph-Imm. Pursuant to the emergency declaration under the [facility], [facility] waiver authority and the [employer] and [employer] Act, this Virtual  Visit was conducted, with patient's (and/or legal guardian's) consent, to reduce the patient's risk of exposure to COVID-19 and provide necessary medical care. Services were provided through a video synchronous discussion virtually to substitute for in-person clinic visit. Patient and provider were located at their individual homes.         Donal Henry MD

## 2021-07-29 DIAGNOSIS — E11.9 CONTROLLED TYPE 2 DIABETES MELLITUS WITHOUT COMPLICATION, WITHOUT LONG-TERM CURRENT USE OF INSULIN (HCC): ICD-10-CM

## 2021-07-29 DIAGNOSIS — E11.9 NEWLY DIAGNOSED DIABETES (HCC): ICD-10-CM

## 2021-08-02 RX ORDER — METFORMIN HYDROCHLORIDE 500 MG/1
TABLET ORAL
Qty: 60 TABLET | Refills: 0 | Status: SHIPPED | OUTPATIENT
Start: 2021-08-02 | End: 2021-09-29 | Stop reason: SDUPTHER

## 2021-08-13 ENCOUNTER — PATIENT OUTREACH (OUTPATIENT)
Dept: CASE MANAGEMENT | Age: 61
End: 2021-08-13

## 2021-08-13 NOTE — PROGRESS NOTES
8/13/21 chart update. Patient has graduated from the Complex Case Management  program on 8/13/21. Patient/family has the ability to self-manage at this time. Care management goals have been completed. No further Ambulatory Care Manager follow up scheduled. Goals Addressed                 This Visit's Progress     COMPLETED: Patient/Family verbalizes understanding of self-management of chronic disease. 2/14/20 Patient reports he is doing much better since his wife has returned home from her recent CVA of 6 weeks ago. He stated he is doing all the grocery shopping and is monitoring both of there food intake. He reports they have decreased sodium intake, eating more fresh vegetables, stopped eating fried foods. He stated they do not eat \"fast food\" any more as it taste too salty. \" Patient was instructed in heart healthy, low carb diet. 2) Patient reports he is checking his blood glucose level daily and it is running about 90.   3) He reports he has decreased his smoking of cigarettes but has not stopped. He stated he does not smoke in the home-as his wife quit 6 weeks ago. Patient was made aware of his upcoming PCP appointment 4/7/2020. Patient was provided ACM and PCP contact information for questions or concerns. Plan patient to attend medical appointments as scheduled. Patient to take medications as prescribed. Patient to continue to work on decreasing intake of high carbohydrate foods, eat less fried foods, and  monitor sodium intake. ACM will monitor patients adherence to plan of care. ACM will attempt to contact patient in 2-3 weeks. [facility]    1/6/2020 Patient reports he is doing \"pretty good\". He stated his biggest concern right now is caring for his wife who suffered a CVA several weeks ago and is now home from [address] [place] [address]. He is the primary caregiver. He reports he has made several lifestyle changes for her and himself.  Patient reports he is cooking healthier meals for both of them, he is following a lot of the recommendations made for his wife to include decreasing sodium intake, eating less fried foods,cutting back on carbohydrates and increasing activity as tolerated. He reported he checks his blood glucose level infrequently but averages around 100. Patient stated he is working to decrease his use of cigarettes, he has decreased his usage from 1 pack a day to 2-3 cigarettes a day. He reports he is continuing his wellbutrin. Medication reconciliation was completed and patient reports he is taking medications as prescribed and has all medications in the home. Patient reports he continues with chronic back pain and will contact Ortho for further pain control. Patient was made aware of his upcoming PCP appointment 4/7/2020. Patient was instructed in heart healthy, low carb diet. Patient was provided ACM and PCP contact information for questions or concerns. Plan patient to attend medical appointments as scheduled. Patient to take medications as prescribed. Patient to continue to work on decreasing intake of high carbohydrate foods, eat less fried foods, and  monitor sodium intake. ACM will discuss with patient the best food choices. ACM will monitor patients adherence to plan of care. ACM will attempt to contact patient in 2-3 weeks. DMB            Patient has Ambulatory Care Manager's contact information for any further questions, concerns, or needs.   Patients upcoming visits:    Future Appointments   Date Time Provider Abraham Wyatt   9/20/2021  8:40 AM Oleg Goddard MD [facility] TOÑITO CROWELL

## 2021-08-23 ENCOUNTER — TELEPHONE (OUTPATIENT)
Dept: FAMILY MEDICINE CLINIC | Age: 61
End: 2021-08-23

## 2021-08-23 NOTE — TELEPHONE ENCOUNTER
----- Message from [facility] sent at 8/23/2021 12:42 PM EDT -----  Regarding: MD Maharaj/telephone  General Message/Vendor Calls    Caller's first and last name:N/A      Reason for call:Request      Callback required yes/no and why: Y      Best contact number(s):[phone]      Details to clarify the request:Patient would like to request muscle relaxer due to pulling a muscle in the lower back.       Liz Weiss

## 2021-08-25 ENCOUNTER — VIRTUAL VISIT (OUTPATIENT)
Dept: FAMILY MEDICINE CLINIC | Age: 61
End: 2021-08-25
Payer: MEDICARE

## 2021-08-25 DIAGNOSIS — Z79.899 ENCOUNTER FOR LONG-TERM (CURRENT) USE OF MEDICATIONS: ICD-10-CM

## 2021-08-25 DIAGNOSIS — M54.41 ACUTE BILATERAL LOW BACK PAIN WITH RIGHT-SIDED SCIATICA: Primary | ICD-10-CM

## 2021-08-25 DIAGNOSIS — E11.9 CONTROLLED TYPE 2 DIABETES MELLITUS WITHOUT COMPLICATION, WITHOUT LONG-TERM CURRENT USE OF INSULIN (HCC): ICD-10-CM

## 2021-08-25 PROCEDURE — 99442 PR PHYS/QHP TELEPHONE EVALUATION 11-20 MIN: CPT | Performed by: FAMILY MEDICINE

## 2021-08-25 RX ORDER — NAPROXEN 500 MG/1
500 TABLET ORAL
Qty: 30 TABLET | Refills: 1 | Status: SHIPPED | OUTPATIENT
Start: 2021-08-25 | End: 2021-09-29 | Stop reason: SDUPTHER

## 2021-08-25 RX ORDER — PREDNISONE 10 MG/1
TABLET ORAL
Qty: 21 TABLET | Refills: 0 | Status: SHIPPED | OUTPATIENT
Start: 2021-08-25 | End: 2021-09-29 | Stop reason: ALTCHOICE

## 2021-08-25 RX ORDER — TAMSULOSIN HYDROCHLORIDE 0.4 MG/1
0.4 CAPSULE ORAL DAILY
COMMUNITY
End: 2021-09-29 | Stop reason: SDUPTHER

## 2021-08-25 RX ORDER — CYCLOBENZAPRINE HCL 10 MG
10 TABLET ORAL
Qty: 14 TABLET | Refills: 0 | Status: SHIPPED | OUTPATIENT
Start: 2021-08-25 | End: 2022-05-31 | Stop reason: ALTCHOICE

## 2021-08-25 NOTE — PROGRESS NOTES
Chief Complaint   Patient presents with    Spasms     pulled muscle back       1. Have you been to the ER, urgent care clinic since your last visit? Hospitalized since your last visit? No     2. Have you seen or consulted any other health care providers outside of the [address] [place] [address] since your last visit? Include any pap smears or colon screening.   No

## 2021-08-25 NOTE — PROGRESS NOTES
Rafy Zamorano is a 57 y.o. male evaluated via telephone on 8/25/2021. Consent:  He and/or health care decision maker is aware that he may receive a bill for this telephone service, depending on his insurance coverage, and has provided verbal consent to proceed: Yes      Documentation:  I communicated with the patient and/or health care decision maker about;   Details of this discussion including any medical advice provided:      has a past medical history of Borderline diabetes, Bradycardia (06/2019), Chronic pain, Colitis (12/3/2018), Degenerative disc disease, lumbar, Essential hypertension (12/3/2018), GERD (gastroesophageal reflux disease), H/O hernia repair (12/11/2018), History of cholecystectomy (12/11/2018), Ill-defined condition, Ill-defined condition, Osteoarthritis of spine with radiculopathy, lumbar region (12/11/2018), and Smoker (12/11/2018). Consent: Rafy Zamorano, who was seen by synchronous (real-time) audio-video technology, and/or his healthcare decision maker, is aware that this patient-initiated, Telehealth encounter on 8/25/2021 is a billable service, with coverage as determined by his insurance carrier. He is aware that he may receive a bill and has provided verbal consent to proceed: Yes. Labs reviewed with pt, STD negative. Low back pain 3 weeks ago while changing tire,  tire and felt it there. Muscle tight. Using heating pad and ice. Pain low back run down right leg. Hx of MRI 2019 Multilevel degenerative disc disease and degenerative changes. Multilevel neuroforaminal narrowing ranging from mild to severe, worse on the left at L5-S1. Moderate spinal canal stenosis at L4-L5. Denies saddle anesthesia, LE weakness, loss of bowel or bladder function. We discussed imaging, maybe worsening back and compression. He have seen Orthopedic in the past and they did recommend back surgery but he doesn't want it. Can't be out of work, on his own and have bills. So he wouldn't do surgery anyway. He rather not waste time doing imaging and refer. Prednisone 10mg taper  Flexeril prn  Naproxen prn    Needs labs for chronic medical issues      Reviewed: active problem list, medication list, allergies, notes from last encounter, lab results    A comprehensive review of systems was negative except for that written in the HPI. Diagnoses and all orders for this visit:    1. Acute bilateral low back pain with right-sided sciatica  -     predniSONE (STERAPRED DS) 10 mg dose pack; See administration instruction per 10mg dose pack  -     cyclobenzaprine (FLEXERIL) 10 mg tablet; Take 1 Tablet by mouth nightly as needed for Muscle Spasm(s). -     naproxen (NAPROSYN) 500 mg tablet; Take 1 Tablet by mouth two (2) times daily as needed for Pain. 2. Controlled type 2 diabetes mellitus without complication, without long-term current use of insulin (HCC)  -     HEMOGLOBIN A1C WITH EAG; Future  -     METABOLIC PANEL, COMPREHENSIVE; Future  -     MICROALBUMIN, UR, RAND W/ MICROALB/CREAT RATIO; Future    3. Encounter for long-term (current) use of medications  -     HEMOGLOBIN A1C WITH EAG; Future  -     METABOLIC PANEL, COMPREHENSIVE; Future  -     MICROALBUMIN, UR, RAND W/ MICROALB/CREAT RATIO; Future      Follow-up and Dispositions    · Return in about 6 weeks (around 10/6/2021) for DM2, HTN, HLD, meds, labs.          Past Medical History:   Diagnosis Date    Borderline diabetes     Bradycardia 06/2019    per pt they changed BP med and all is resolved; Dr. Kervin Gasca Chronic pain     back    Colitis 12/3/2018    Degenerative disc disease, lumbar     Essential hypertension 12/3/2018    GERD (gastroesophageal reflux disease)     hiatal hernia    H/O hernia repair 12/11/2018    History of cholecystectomy 12/11/2018    Ill-defined condition     ruptured gall bladder    Ill-defined condition     hx of decreased O2 sats\" per pt he has been in the 90's and had to have a breathing tx prior to back injection\"     Osteoarthritis of spine with radiculopathy, lumbar region 12/11/2018    Smoker 12/11/2018       Current Outpatient Medications on File Prior to Visit   Medication Sig Dispense Refill    tamsulosin (FLOMAX) 0.4 mg capsule Take 0.4 mg by mouth daily.  metFORMIN (GLUCOPHAGE) 500 mg tablet TAKE 1 TABLET TWICE DAILY  WITH  MEALS (Patient taking differently: 250 mg two (2) times daily (with meals). ) 60 Tablet 0    Blood-Glucose Meter (True Metrix Air Glucose Meter) monitoring kit Test Once Daily 1 Kit 0    glucose blood VI test strips (True Metrix Glucose Test Strip) strip Test Once Daily 100 Strip 1    lancets (TRUEplus Lancets) 33 gauge misc Test Once Daily 100 Lancet 1    alcohol swabs (BD Single Use Swabs Regular) padm Test Once Daily 100 Pad 1    Blood Glucose Control, Low (True Metrix Level 1) soln Test per directions of meter 3 Each 1    Blood-Glucose Meter monitoring kit Check once daily 1 Kit 0    lancets misc Check once daily 100 Each 1    glucose blood VI test strips (ASCENSIA AUTODISC VI, ONE TOUCH ULTRA TEST VI) strip Check once daily 90 Strip 1    atorvastatin (LIPITOR) 10 mg tablet Take 1 Tab by mouth daily. 90 Tab 1    amLODIPine (NORVASC) 5 mg tablet TAKE 1 TABLET EVERY DAY 90 Tab 1    ibuprofen (MOTRIN) 200 mg tablet Take 600 mg by mouth every eight (8) hours as needed for Pain. No current facility-administered medications on file prior to visit. I affirm this is a Patient Initiated Episode with a Patient who has not had a related appointment within my department in the past 7 days or scheduled within the next 24 hours.     Total Time: minutes: 11-20 minutes    Note: not billable if this call serves to triage the patient into an appointment for the relevant concern      Charmayne App, MD

## 2021-09-16 RX ORDER — LANCETS 33 GAUGE
EACH MISCELLANEOUS
Qty: 100 EACH | Refills: 1 | Status: SHIPPED | OUTPATIENT
Start: 2021-09-16 | End: 2022-02-28

## 2021-09-16 RX ORDER — ISOPROPYL ALCOHOL 70 ML/100ML
SWAB TOPICAL
Qty: 100 PAD | Refills: 1 | Status: SHIPPED | OUTPATIENT
Start: 2021-09-16 | End: 2022-01-17 | Stop reason: ALTCHOICE

## 2021-09-16 RX ORDER — CALCIUM CITRATE/VITAMIN D3 200MG-6.25
TABLET ORAL
Qty: 100 STRIP | Refills: 1 | Status: SHIPPED | OUTPATIENT
Start: 2021-09-16 | End: 2022-02-28

## 2021-09-27 DIAGNOSIS — E78.2 MIXED HYPERLIPIDEMIA: ICD-10-CM

## 2021-09-29 ENCOUNTER — OFFICE VISIT (OUTPATIENT)
Dept: FAMILY MEDICINE CLINIC | Age: 61
End: 2021-09-29
Payer: MEDICARE

## 2021-09-29 VITALS
WEIGHT: 219 LBS | OXYGEN SATURATION: 95 % | BODY MASS INDEX: 29.66 KG/M2 | RESPIRATION RATE: 18 BRPM | HEIGHT: 72 IN | TEMPERATURE: 97.8 F | SYSTOLIC BLOOD PRESSURE: 123 MMHG | HEART RATE: 62 BPM | DIASTOLIC BLOOD PRESSURE: 79 MMHG

## 2021-09-29 DIAGNOSIS — Z79.899 ENCOUNTER FOR LONG-TERM (CURRENT) USE OF MEDICATIONS: ICD-10-CM

## 2021-09-29 DIAGNOSIS — R39.11 BENIGN PROSTATIC HYPERPLASIA WITH URINARY HESITANCY: ICD-10-CM

## 2021-09-29 DIAGNOSIS — N40.1 BENIGN PROSTATIC HYPERPLASIA WITH URINARY HESITANCY: ICD-10-CM

## 2021-09-29 DIAGNOSIS — G89.29 CHRONIC BILATERAL LOW BACK PAIN WITHOUT SCIATICA: ICD-10-CM

## 2021-09-29 DIAGNOSIS — Z00.00 MEDICARE ANNUAL WELLNESS VISIT, SUBSEQUENT: Primary | ICD-10-CM

## 2021-09-29 DIAGNOSIS — Z23 NEEDS FLU SHOT: ICD-10-CM

## 2021-09-29 DIAGNOSIS — E11.9 CONTROLLED TYPE 2 DIABETES MELLITUS WITHOUT COMPLICATION, WITHOUT LONG-TERM CURRENT USE OF INSULIN (HCC): ICD-10-CM

## 2021-09-29 DIAGNOSIS — E78.2 MIXED HYPERLIPIDEMIA: ICD-10-CM

## 2021-09-29 DIAGNOSIS — M54.50 CHRONIC BILATERAL LOW BACK PAIN WITHOUT SCIATICA: ICD-10-CM

## 2021-09-29 DIAGNOSIS — I10 ESSENTIAL HYPERTENSION: ICD-10-CM

## 2021-09-29 PROCEDURE — 90686 IIV4 VACC NO PRSV 0.5 ML IM: CPT | Performed by: FAMILY MEDICINE

## 2021-09-29 PROCEDURE — G8752 SYS BP LESS 140: HCPCS | Performed by: FAMILY MEDICINE

## 2021-09-29 PROCEDURE — G0439 PPPS, SUBSEQ VISIT: HCPCS | Performed by: FAMILY MEDICINE

## 2021-09-29 PROCEDURE — G8510 SCR DEP NEG, NO PLAN REQD: HCPCS | Performed by: FAMILY MEDICINE

## 2021-09-29 PROCEDURE — G8417 CALC BMI ABV UP PARAM F/U: HCPCS | Performed by: FAMILY MEDICINE

## 2021-09-29 PROCEDURE — G8427 DOCREV CUR MEDS BY ELIG CLIN: HCPCS | Performed by: FAMILY MEDICINE

## 2021-09-29 PROCEDURE — G8754 DIAS BP LESS 90: HCPCS | Performed by: FAMILY MEDICINE

## 2021-09-29 PROCEDURE — 3044F HG A1C LEVEL LT 7.0%: CPT | Performed by: FAMILY MEDICINE

## 2021-09-29 PROCEDURE — 99214 OFFICE O/P EST MOD 30 MIN: CPT | Performed by: FAMILY MEDICINE

## 2021-09-29 PROCEDURE — 3017F COLORECTAL CA SCREEN DOC REV: CPT | Performed by: FAMILY MEDICINE

## 2021-09-29 PROCEDURE — G0008 ADMIN INFLUENZA VIRUS VAC: HCPCS | Performed by: FAMILY MEDICINE

## 2021-09-29 PROCEDURE — 2022F DILAT RTA XM EVC RTNOPTHY: CPT | Performed by: FAMILY MEDICINE

## 2021-09-29 RX ORDER — NAPROXEN 500 MG/1
500 TABLET ORAL
Qty: 30 TABLET | Refills: 1 | Status: SHIPPED | OUTPATIENT
Start: 2021-09-29 | End: 2022-01-17 | Stop reason: SDUPTHER

## 2021-09-29 RX ORDER — TAMSULOSIN HYDROCHLORIDE 0.4 MG/1
0.4 CAPSULE ORAL DAILY
Qty: 90 CAPSULE | Refills: 1 | Status: SHIPPED | OUTPATIENT
Start: 2021-09-29 | End: 2022-01-17 | Stop reason: SDUPTHER

## 2021-09-29 RX ORDER — ATORVASTATIN CALCIUM 10 MG/1
10 TABLET, FILM COATED ORAL DAILY
Qty: 90 TABLET | Refills: 1 | Status: SHIPPED | OUTPATIENT
Start: 2021-09-29 | End: 2022-01-17 | Stop reason: SDUPTHER

## 2021-09-29 RX ORDER — ATORVASTATIN CALCIUM 10 MG/1
TABLET, FILM COATED ORAL
Qty: 90 TABLET | Refills: 1 | Status: SHIPPED | OUTPATIENT
Start: 2021-09-29 | End: 2022-01-17 | Stop reason: SDUPTHER

## 2021-09-29 RX ORDER — METFORMIN HYDROCHLORIDE 500 MG/1
250 TABLET ORAL 2 TIMES DAILY WITH MEALS
Qty: 90 TABLET | Refills: 1 | Status: SHIPPED | OUTPATIENT
Start: 2021-09-29 | End: 2022-02-28

## 2021-09-29 RX ORDER — AMLODIPINE BESYLATE 5 MG/1
TABLET ORAL
Qty: 90 TABLET | Refills: 1 | Status: SHIPPED | OUTPATIENT
Start: 2021-09-29 | End: 2022-01-17 | Stop reason: SDUPTHER

## 2021-09-29 NOTE — PATIENT INSTRUCTIONS
Vaccine Information Statement    Influenza (Flu) Vaccine (Inactivated or Recombinant): What You Need to Know    Many vaccine information statements are available in Polish and other languages. See www.immunize.org/vis. Hojas de información sobre vacunas están disponibles en español y en muchos otros idiomas. Visite www.immunize.org/vis. 1. Why get vaccinated? Influenza vaccine can prevent influenza (flu). Flu is a contagious disease that spreads around the [facility] every year, usually between October and May. Anyone can get the flu, but it is more dangerous for some people. Infants and young children, people 72 years and older, pregnant people, and people with certain health conditions or a weakened immune system are at greatest risk of flu complications. Pneumonia, bronchitis, sinus infections, and ear infections are examples of flu-related complications. If you have a medical condition, such as heart disease, cancer, or diabetes, flu can make it worse. Flu can cause fever and chills, sore throat, muscle aches, fatigue, cough, headache, and runny or stuffy nose. Some people may have vomiting and diarrhea, though this is more common in children than adults. In an average year, thousands of people in the [facility] die from flu, and many more are hospitalized. Flu vaccine prevents millions of illnesses and flu-related visits to the doctor each year. 2. Influenza vaccines     CDC recommends everyone 6 months and older get vaccinated every flu season. Children 6 months through 6years of age may need 2 doses during a single flu season. Everyone else needs only 1 dose each flu season. It takes about 2 weeks for protection to develop after vaccination. There are many flu viruses, and they are always changing. Each year a new flu vaccine is made to protect against the influenza viruses believed to be likely to cause disease in the upcoming flu season.  Even when the vaccine doesnt exactly match these viruses, it may still provide some protection. Influenza vaccine does not cause flu. Influenza vaccine may be given at the same time as other vaccines. 3. Talk with your health care provider    Tell your vaccination provider if the person getting the vaccine:   Has had an allergic reaction after a previous dose of influenza vaccine, or has any severe, life-threatening allergies    Has ever had Guillain-Barré Syndrome (also called GBS)    In some cases, your health care provider may decide to postpone influenza vaccination until a future visit. Influenza vaccine can be administered at any time during pregnancy. People who are or will be pregnant during influenza season should receive inactivated influenza vaccine. People with minor illnesses, such as a cold, may be vaccinated. People who are moderately or severely ill should usually wait until they recover before getting influenza vaccine. Your health care provider can give you more information. 4. Risks of a vaccine reaction     Soreness, redness, and swelling where the shot is given, fever, muscle aches, and headache can happen after influenza vaccination.  There may be a very small increased risk of Guillain-Barré Syndrome (GBS) after inactivated influenza vaccine (the flu shot). [employer] children who get the flu shot along with pneumococcal vaccine (PCV13) and/or DTaP vaccine at the same time might be slightly more likely to have a seizure caused by fever. Tell your health care provider if a child who is getting flu vaccine has ever had a seizure. People sometimes faint after medical procedures, including vaccination. Tell your provider if you feel dizzy or have vision changes or ringing in the ears. As with any medicine, there is a very remote chance of a vaccine causing a severe allergic reaction, other serious injury, or death. 5. What if there is a serious problem?     An allergic reaction could occur after the vaccinated person leaves the clinic. If you see signs of a severe allergic reaction (hives, swelling of the face and throat, difficulty breathing, a fast heartbeat, dizziness, or weakness), call 9-1-1 and get the person to the nearest hospital.    For other signs that concern you, call your health care provider. Adverse reactions should be reported to the Vaccine Adverse Event Reporting System (VAERS). Your health care provider will usually file this report, or you can do it yourself. Visit the VAERS website at www.vaers. [facility].gov or call 1-631.370.6066. VAERS is only for reporting reactions, and VAERS staff members do not give medical advice. 6. The National Vaccine Injury Compensation Program    The [facility] Vaccine Injury Compensation Program (VICP) is a federal program that was created to compensate people who may have been injured by certain vaccines. Claims regarding alleged injury or death due to vaccination have a time limit for filing, which may be as short as two years. Visit the VICP website at www.[facility].gov/vaccinecompensation or call 2-411.248.4236 to learn about the program and about filing a claim. 7. How can I learn more?  Ask your health care provider.  Call your local or state health department.  Visit the website of the Food and Drug Administration (FDA) for vaccine package inserts and additional information at www.fda.gov/vaccines-blood-biologics/vaccines.  Contact the Centers for Disease Control and Prevention (CDC):  - Call 2-431.732.4399 (1-800-CDC-INFO) or  - Visit CDCs influenza website at www.cdc.gov/flu. Vaccine Information Statement   Inactivated Influenza Vaccine   8/6/2021  42 LOLAAnny Fischer Vesna [id number]   Department of Health and Human Services  Centers for Disease Control and Prevention    Office Use Only

## 2021-09-29 NOTE — PROGRESS NOTES
This is a Rukhsana Exam (AWV)     I have reviewed the patient's medical history in detail and updated the computerized patient record. Camilo Sanchez is a 57 y.o. male    Vaccinated for Brandi     has a past medical history of Borderline diabetes, Bradycardia (06/2019), Chronic pain, Colitis (12/3/2018), Degenerative disc disease, lumbar, Essential hypertension (12/3/2018), GERD (gastroesophageal reflux disease), H/O hernia repair (12/11/2018), History of cholecystectomy (12/11/2018), Ill-defined condition, Ill-defined condition, Osteoarthritis of spine with radiculopathy, lumbar region (12/11/2018), and Smoker (12/11/2018).     DM2: a1C 5.9% 09/2021, 6.7% 4/2021, 6.5% 1/2021, 6.1% 06/2020, 6.1% 12/2019, 6.2% 05/2019  Discussed pathophysiology and risk, complication.   He have cut out all sugar, have lost 5lbs. But his A1C still up.    Discussed blood glucose monitoring  metformin 1/2 BID or hold it prn. Continue course     HTN: BP at goal  Continue amlodipine    HLD: lipitor 10mg    Smoker. 40+ packs years. Discuss cessation. He never started wellbutrin     BPH: urinary hessitency and dribling.        History     Past Medical History:   Diagnosis Date    Borderline diabetes     Bradycardia 06/2019    per pt they changed BP med and all is resolved; Dr. Tracey Layne Chronic pain     back    Colitis 12/3/2018    Degenerative disc disease, lumbar     Essential hypertension 12/3/2018    GERD (gastroesophageal reflux disease)     hiatal hernia    H/O hernia repair 12/11/2018    History of cholecystectomy 12/11/2018    Ill-defined condition     ruptured gall bladder    Ill-defined condition     hx of decreased O2 sats\" per pt he has been in the 90's and had to have a breathing tx prior to back injection\"     Osteoarthritis of spine with radiculopathy, lumbar region 12/11/2018    Smoker 12/11/2018      Past Surgical History:   Procedure Laterality Date    COLONOSCOPY N/A 12/18/2018 COLONOSCOPY performed by Shelby Melendez MD at [address] [place] [address]  12/18/2018         HX CHOLECYSTECTOMY      HX COLONOSCOPY      HX HEENT  06/24/2019    oral surgery on back teeth    HX ORTHOPAEDIC      lt shoulder, muscle and skin graft    HX ORTHOPAEDIC      rt shoulder, anchors in place    HX ORTHOPAEDIC      rt foot, removed bone spur by great toe    HX ORTHOPAEDIC      rt hand, removed infected and cyst on wrist    HX OTHER SURGICAL      incisional hernia     Current Outpatient Medications   Medication Sig Dispense Refill    atorvastatin (LIPITOR) 10 mg tablet Take 1 Tablet by mouth daily. 90 Tablet 1    amLODIPine (NORVASC) 5 mg tablet TAKE 1 TABLET EVERY DAY 90 Tablet 1    metFORMIN (GLUCOPHAGE) 500 mg tablet Take 0.5 Tablets by mouth two (2) times daily (with meals). 90 Tablet 1    tamsulosin (FLOMAX) 0.4 mg capsule Take 1 Capsule by mouth daily. 90 Capsule 1    naproxen (NAPROSYN) 500 mg tablet Take 1 Tablet by mouth two (2) times daily as needed for Pain. 30 Tablet 1    glucose blood VI test strips (True Metrix Glucose Test Strip) strip TEST ONE TIME DAILY 100 Strip 1    lancets (TRUEplus Lancets) 33 gauge misc TEST EVERY  Each 1    alcohol swabs (BD Single Use Swabs Regular) padm TEST EVERY  Pad 1    cyclobenzaprine (FLEXERIL) 10 mg tablet Take 1 Tablet by mouth nightly as needed for Muscle Spasm(s).  14 Tablet 0    lancets misc Check once daily 100 Each 1    glucose blood VI test strips (ASCENSIA AUTODISC VI, ONE TOUCH ULTRA TEST VI) strip Check once daily 90 Strip 1     Allergies   Allergen Reactions    Aspirin Other (comments)     Nose bleeds    Morphine Other (comments)     Agitation     Family History   Problem Relation Age of Onset    Cancer Mother     Cancer Father      Social History     Tobacco Use    Smoking status: Current Every Day Smoker     Packs/day: 0.50     Years: 40.00     Pack years: 20.00     Types: Cigarettes    Smokeless tobacco: Never Used   Substance Use Topics    Alcohol use: Yes     Comment: rarely     Patient Active Problem List   Diagnosis Code    Colitis K52.9    Essential hypertension I10    Osteoarthritis of spine with radiculopathy, lumbar region M47.26    Smoker F17.200    History of cholecystectomy Z90.49    H/O hernia repair Z98.890, Z87.19       Depression Risk Factor Screening:     3 most recent PHQ Screens 9/29/2021   Little interest or pleasure in doing things Not at all   Feeling down, depressed, irritable, or hopeless Not at all   Total Score PHQ 2 0   Trouble falling or staying asleep, or sleeping too much -   Feeling tired or having little energy -   Poor appetite, weight loss, or overeating -   Feeling bad about yourself - or that you are a failure or have let yourself or your family down -   Trouble concentrating on things such as school, work, reading, or watching TV -   Moving or speaking so slowly that other people could have noticed; or the opposite being so fidgety that others notice -   Thoughts of being better off dead, or hurting yourself in some way -   PHQ 9 Score -   How difficult have these problems made it for you to do your work, take care of your home and get along with others -       Alcohol Risk Factor Screening: You do not drink alcohol or very rarely. Functional Ability and Level of Safety:     Hearing Loss   Hearing is good. Activities of Daily Living   Self-care. Requires assistance with: no ADLs    Fall Risk   No flowsheet data found. Abuse Screen   Patient is not abused    Review of Systems   A comprehensive review of systems was negative except for that written in the HPI.     Physical Examination     Evaluation of Cognitive Function:  Mood/affect:  neutral, happy  Appearance: age appropriate and casually dressed  Family member/caregiver input: none present    Visit Vitals  /79   Pulse 62   Temp 97.8 °F (36.6 °C) (Temporal)   Resp 18   Ht 6' (1.829 m)   Wt 219 lb (99.3 kg)   SpO2 95%   BMI 29.70 kg/m²     General appearance: alert, cooperative, no distress, appears stated age  Neurologic: Alert and oriented X 3, normal strength and tone, symmetric. Normal without focal findings. Cranial nerves 2-12 intact. Normal coordination and gait. Mental status: Alert, oriented, thought content appropriate, affect: stable, mood-congruent. Head: Normocephalic, without obvious abnormality, atraumatic  Eyes: conjunctivae/corneas clear. PERRL, EOM's intact. Neck: supple, symmetrical, trachea midline, no JVD. No carotid bruites  Lungs: clear to auscultation bilaterally  Heart: regular rate and rhythm, S1, S2 normal, no murmur, click, rub or gallop  Abdomen: soft, non-tender. Feet: dry, warm, skin intact, structure intact. normal DP and PT pulses, no trophic changes or ulcerative lesions and normal monofilament exam on right foot. Left foot monofilament exam slight changes in sensation at distal foot. Patient Care Team:  Margie Sauceda MD as PCP - General (Family Medicine)  Margie Sauceda MD as PCP - [address] [place] [address] [facility]  Odessa Alva MD (Cardiology)    Advice/Referrals/Counseling   Education and counseling provided:  Are appropriate based on today's review and evaluation  End-of-Life planning (with patient's consent)  Pneumococcal Vaccine  Influenza Vaccine  Prostate cancer screening tests (PSA, covered annually)  Colorectal cancer screening tests  Diabetes outpatient self-management training services       Assessment/Plan     Diagnoses and all orders for this visit:    1. Medicare annual wellness visit, subsequent    2. Controlled type 2 diabetes mellitus without complication, without long-term current use of insulin (HCC)  -     metFORMIN (GLUCOPHAGE) 500 mg tablet; Take 0.5 Tablets by mouth two (2) times daily (with meals). -     METABOLIC PANEL, COMPREHENSIVE; Future  -     HEMOGLOBIN A1C WITH EAG; Future  -      DIABETES FOOT EXAM    3.  Essential hypertension  -     amLODIPine (NORVASC) 5 mg tablet; TAKE 1 TABLET EVERY DAY  -     METABOLIC PANEL, COMPREHENSIVE; Future    4. Chronic bilateral low back pain without sciatica  -     naproxen (NAPROSYN) 500 mg tablet; Take 1 Tablet by mouth two (2) times daily as needed for Pain. 5. Mixed hyperlipidemia  -     atorvastatin (LIPITOR) 10 mg tablet; Take 1 Tablet by mouth daily.  -     METABOLIC PANEL, COMPREHENSIVE; Future    6. Benign prostatic hyperplasia with urinary hesitancy  -     tamsulosin (FLOMAX) 0.4 mg capsule; Take 1 Capsule by mouth daily.  -     PSA W/ REFLX FREE PSA; Future    7. Encounter for long-term (current) use of medications  -     METABOLIC PANEL, COMPREHENSIVE; Future  -     HEMOGLOBIN A1C WITH EAG; Future  -     PSA W/ REFLX FREE PSA; Future    8. Needs flu shot  -     INFLUENZA VIRUS VAC QUAD,SPLIT,PRESV FREE SYRINGE IM      Follow-up and Dispositions    · Return in about 4 months (around 1/29/2022) for Dm2, HTN, HLD, BPH, meds. Talia Mcguire MD  9/29/2021.

## 2021-09-29 NOTE — PROGRESS NOTES
Chief Complaint   Patient presents with    Diabetes    Hypertension    Cholesterol Problem     6 week check    1. Have you been to the ER, urgent care clinic since your last visit? Hospitalized since your last visit? No     2. Have you seen or consulted any other health care providers outside of the [address] [place] [address] since your last visit? Include any pap smears or colon screening. No       Order placed for Flulaval, per Verbal Order from Dr. Reanna Santos on 9/29/2021 due to need for immunization. Andie Black is a 57 y.o. male who presents for routine immunizations. He denies any symptoms , reactions or allergies that would exclude them from being immunized today. Risks and adverse reactions were discussed and the VIS was given to them. All questions were addressed. He was observed for 15 min post injection. There were no reactions observed.     Will Nash LPN

## 2022-01-17 ENCOUNTER — HOSPITAL ENCOUNTER (OUTPATIENT)
Dept: GENERAL RADIOLOGY | Age: 62
Discharge: HOME | End: 2022-01-17
Payer: MEDICARE

## 2022-01-17 ENCOUNTER — OFFICE VISIT (OUTPATIENT)
Dept: FAMILY MEDICINE CLINIC | Age: 62
End: 2022-01-17
Payer: MEDICARE

## 2022-01-17 VITALS
HEIGHT: 72 IN | RESPIRATION RATE: 18 BRPM | HEART RATE: 73 BPM | BODY MASS INDEX: 30.56 KG/M2 | OXYGEN SATURATION: 96 % | WEIGHT: 225.6 LBS | DIASTOLIC BLOOD PRESSURE: 78 MMHG | TEMPERATURE: 98.4 F | SYSTOLIC BLOOD PRESSURE: 115 MMHG

## 2022-01-17 DIAGNOSIS — F17.218 NICOTINE DEPENDENCE, CIGARETTES, WITH OTHER NICOTINE-INDUCED DISORDERS: ICD-10-CM

## 2022-01-17 DIAGNOSIS — E78.2 MIXED HYPERLIPIDEMIA: ICD-10-CM

## 2022-01-17 DIAGNOSIS — R06.09 DOE (DYSPNEA ON EXERTION): Primary | ICD-10-CM

## 2022-01-17 DIAGNOSIS — Z79.899 ENCOUNTER FOR LONG-TERM (CURRENT) USE OF MEDICATIONS: ICD-10-CM

## 2022-01-17 DIAGNOSIS — I10 ESSENTIAL HYPERTENSION: ICD-10-CM

## 2022-01-17 DIAGNOSIS — R06.02 SOB (SHORTNESS OF BREATH) ON EXERTION: ICD-10-CM

## 2022-01-17 DIAGNOSIS — E11.9 TYPE 2 DIABETES MELLITUS WITHOUT COMPLICATION, WITHOUT LONG-TERM CURRENT USE OF INSULIN (HCC): ICD-10-CM

## 2022-01-17 DIAGNOSIS — N40.1 BENIGN PROSTATIC HYPERPLASIA WITH URINARY HESITANCY: ICD-10-CM

## 2022-01-17 DIAGNOSIS — F17.219 NICOTINE DEPENDENCE, CIGARETTES, WITH UNSPECIFIED NICOTINE-INDUCED DISORDERS: ICD-10-CM

## 2022-01-17 DIAGNOSIS — R39.11 BENIGN PROSTATIC HYPERPLASIA WITH URINARY HESITANCY: ICD-10-CM

## 2022-01-17 DIAGNOSIS — M54.50 CHRONIC BILATERAL LOW BACK PAIN WITHOUT SCIATICA: ICD-10-CM

## 2022-01-17 DIAGNOSIS — Z87.891 PERSONAL HISTORY OF TOBACCO USE, PRESENTING HAZARDS TO HEALTH: ICD-10-CM

## 2022-01-17 DIAGNOSIS — G89.29 CHRONIC BILATERAL LOW BACK PAIN WITHOUT SCIATICA: ICD-10-CM

## 2022-01-17 PROCEDURE — G0296 VISIT TO DETERM LDCT ELIG: HCPCS | Performed by: FAMILY MEDICINE

## 2022-01-17 PROCEDURE — 71046 X-RAY EXAM CHEST 2 VIEWS: CPT

## 2022-01-17 RX ORDER — NAPROXEN 500 MG/1
500 TABLET ORAL
Qty: 30 TABLET | Refills: 2 | Status: SHIPPED | OUTPATIENT
Start: 2022-01-17 | End: 2022-06-13 | Stop reason: SDUPTHER

## 2022-01-17 RX ORDER — AMLODIPINE BESYLATE 5 MG/1
TABLET ORAL
Qty: 90 TABLET | Refills: 1 | Status: SHIPPED | OUTPATIENT
Start: 2022-01-17 | End: 2022-06-13 | Stop reason: SDUPTHER

## 2022-01-17 RX ORDER — BUPROPION HYDROCHLORIDE 150 MG/1
150 TABLET ORAL DAILY
COMMUNITY
End: 2022-06-13 | Stop reason: ALTCHOICE

## 2022-01-17 RX ORDER — ATORVASTATIN CALCIUM 10 MG/1
TABLET, FILM COATED ORAL
Qty: 90 TABLET | Refills: 1 | Status: SHIPPED | OUTPATIENT
Start: 2022-01-17 | End: 2022-06-13 | Stop reason: SDUPTHER

## 2022-01-17 RX ORDER — ALBUTEROL SULFATE 90 UG/1
1 AEROSOL, METERED RESPIRATORY (INHALATION)
Qty: 18 G | Refills: 1 | Status: SHIPPED | OUTPATIENT
Start: 2022-01-17 | End: 2022-06-13 | Stop reason: SDUPTHER

## 2022-01-17 RX ORDER — TAMSULOSIN HYDROCHLORIDE 0.4 MG/1
0.4 CAPSULE ORAL DAILY
Qty: 90 CAPSULE | Refills: 1 | Status: SHIPPED | OUTPATIENT
Start: 2022-01-17 | End: 2022-06-13 | Stop reason: SDUPTHER

## 2022-01-17 NOTE — PROGRESS NOTES
Chief Complaint   Patient presents with    Diabetes    Hypertension    Cholesterol Problem     Check meds & labs    1. Have you been to the ER, urgent care clinic since your last visit? Hospitalized since your last visit? No     2. Have you seen or consulted any other health care providers outside of the [address] [place] [address] since your last visit? Include any pap smears or colon screening.  No

## 2022-01-17 NOTE — PROGRESS NOTES
Liban Barriga is a 57 y.o. male    Disability due to back and shoulder    Vaccinated for COVID       has a past medical history of Borderline diabetes, Bradycardia (06/2019), Chronic pain, Colitis (12/3/2018), Degenerative disc disease, lumbar, Essential hypertension (12/3/2018), GERD (gastroesophageal reflux disease), H/O hernia repair (12/11/2018), History of cholecystectomy (12/11/2018), Ill-defined condition, Ill-defined condition, Osteoarthritis of spine with radiculopathy, lumbar region (12/11/2018), and Smoker (12/11/2018). Labs reviewed w pt    Was walking 45 minutes daily for exercise, but past 3 months feels progressively worse. Now can only walk about 5 minutes before out of breath. Denies CP. Denies COVID infection  Work up Matt Brown   Due for CT lung low dose    Smoker. 1ppd since 11yoa, 50+ packs years. Discuss cessation. He started wellbutrin 2 weeks ago and is now down to 3cigs per day. DM2: a1C 5.7% 1/2022, 5.9% 09/2021, 6.7% 4/2021, 6.5% 1/2021, 6.1% 06/2020, 6.1% 12/2019, 6.2% 05/2019  Discussed pathophysiology and risk, complication.   Dieting   Discussed blood glucose monitoring  metformin 1/2 BID or hold it prn.   Continue course     HTN: BP at goal  Continue amlodipine     HLD: lipitor 10mg      BPH: PSA normal - urinary hessitency and dribling. flomax      Reviewed: active problem list, medication list, allergies, notes from last encounter, lab results    A comprehensive review of systems was negative except for that written in the HPI. Allergies   Allergen Reactions    Aspirin Other (comments)     Nose bleeds    Morphine Other (comments)     Agitation     Current Outpatient Medications on File Prior to Visit   Medication Sig Dispense Refill    buPROPion XL (WELLBUTRIN XL) 150 mg tablet Take 150 mg by mouth daily.  metFORMIN (GLUCOPHAGE) 500 mg tablet Take 0.5 Tablets by mouth two (2) times daily (with meals).  90 Tablet 1    glucose blood VI test strips (True Metrix Glucose Test Strip) strip TEST ONE TIME DAILY 100 Strip 1    lancets (TRUEplus Lancets) 33 gauge misc TEST EVERY  Each 1    cyclobenzaprine (FLEXERIL) 10 mg tablet Take 1 Tablet by mouth nightly as needed for Muscle Spasm(s). 14 Tablet 0    lancets misc Check once daily 100 Each 1    glucose blood VI test strips (ASCENSIA AUTODISC VI, ONE TOUCH ULTRA TEST VI) strip Check once daily 90 Strip 1     No current facility-administered medications on file prior to visit. Patient Active Problem List   Diagnosis Code    Colitis K52.9    Essential hypertension I10    Osteoarthritis of spine with radiculopathy, lumbar region M47.26    Smoker F17.200    History of cholecystectomy Z90.49    H/O hernia repair Z98.890, Z87.19    Type 2 diabetes mellitus E11.9       Visit Vitals  /78   Pulse 73   Temp 98.4 °F (36.9 °C) (Temporal)   Resp 18   Ht 6' (1.829 m)   Wt 225 lb 9.6 oz (102.3 kg)   SpO2 96%   BMI 30.60 kg/m²     General appearance: alert, cooperative, no distress, appears stated age  Neurologic: Alert and oriented X 3, normal strength and tone, symmetric. Normal without focal findings. Cranial nerves 2-12 intact. Normal coordination and gait. Mental status: Alert, oriented, thought content appropriate, affect: stable, mood-congruent. Head: Normocephalic, without obvious abnormality, atraumatic  Eyes: conjunctivae/corneas clear. PERRL, EOM's intact. Neck: supple, symmetrical, trachea midline, no JVD  Lungs: clear to auscultation bilaterally  Heart: regular rate and rhythm, S1, S2 normal, no murmur, click, rub or gallop  Abdomen: soft, non-tender. Extremities: extremities normal, atraumatic, no cyanosis or edema      Assessment/Plans:    Diagnoses and all orders for this visit:    1. YANG (dyspnea on exertion)  -     NUCLEAR CARDIAC STRESS TEST; Future  -     CBC W/O DIFF; Future  -     TSH 3RD GENERATION;  Future  -     albuterol (PROVENTIL HFA, VENTOLIN HFA, PROAIR HFA) 90 mcg/actuation inhaler; Take 1 Puff by inhalation every four (4) hours as needed for Wheezing. 2. SOB (shortness of breath) on exertion  -     XR CHEST PA LAT; Future  -     NUCLEAR CARDIAC STRESS TEST; Future  -     CBC W/O DIFF; Future  -     TSH 3RD GENERATION; Future  -     albuterol (PROVENTIL HFA, VENTOLIN HFA, PROAIR HFA) 90 mcg/actuation inhaler; Take 1 Puff by inhalation every four (4) hours as needed for Wheezing. 3. Nicotine dependence, cigarettes, with other nicotine-induced disorders  -     CT LOW DOSE LUNG CANCER SCREENING; Future  -     albuterol (PROVENTIL HFA, VENTOLIN HFA, PROAIR HFA) 90 mcg/actuation inhaler; Take 1 Puff by inhalation every four (4) hours as needed for Wheezing. 4. Personal history of tobacco use, presenting hazards to health  -     CT LOW DOSE LUNG CANCER SCREENING; Future    5. Nicotine dependence, cigarettes, with unspecified nicotine-induced disorders  -     CT LOW DOSE LUNG CANCER SCREENING; Future    6. Type 2 diabetes mellitus without complication, without long-term current use of insulin (HCC)  -     TSH 3RD GENERATION; Future  -     LIPID PANEL; Future    7. Essential hypertension  -     TSH 3RD GENERATION; Future  -     amLODIPine (NORVASC) 5 mg tablet; TAKE 1 TABLET EVERY DAY    8. Mixed hyperlipidemia  -     CBC W/O DIFF; Future  -     TSH 3RD GENERATION; Future  -     LIPID PANEL; Future  -     atorvastatin (LIPITOR) 10 mg tablet; TAKE 1 TABLET EVERY DAY    9. Encounter for long-term (current) use of medications  -     CBC W/O DIFF; Future  -     TSH 3RD GENERATION; Future  -     LIPID PANEL; Future    10. Benign prostatic hyperplasia with urinary hesitancy  -     tamsulosin (FLOMAX) 0.4 mg capsule; Take 1 Capsule by mouth daily. 11. Chronic bilateral low back pain without sciatica  -     naproxen (NAPROSYN) 500 mg tablet; Take 1 Tablet by mouth two (2) times daily as needed for Pain. Discussed plans, risk/benefits of treatments/observations. Through the use of shared decision making, above plans were agreed upon. Medication compliance advised. Patient verbalized understanding. Follow-up and Dispositions    · Return in about 3 weeks (around 2/7/2022) for YANG. Salena Lundborg, MD  1/17/2022     Discussed with patient current guidelines for screening for lung cancer. Current recommendations are to obtain yearly screening LDCT yearly for age 46-80, or until smoke free for 15 years. Patient has 50 pack year history of cigarette smoking and currently smoking but is cutting down. Discussed with patient risks and benefits of screening, including over-diagnosis, false positive rate, and total radiation exposure. Patient currently exhibits no signs or symptoms suggestive of lung cancer. Discussed with patient importance of compliance with yearly annual lung cancer screenings and willingness to undergo diagnosis and treatment if screening scan is positive. In addition, patient was counseled regarding (remaining smoke free/total smoking cessation).

## 2022-01-18 LAB
CHOLEST SERPL-MCNC: 134 MG/DL (ref 100–199)
ERYTHROCYTE [DISTWIDTH] IN BLOOD BY AUTOMATED COUNT: 12.5 % (ref 11.6–15.4)
HCT VFR BLD AUTO: 51.7 % (ref 37.5–51)
HDLC SERPL-MCNC: 44 MG/DL
HGB BLD-MCNC: 17.9 G/DL (ref 13–17.7)
LDLC SERPL CALC-MCNC: 66 MG/DL (ref 0–99)
MCH RBC QN AUTO: 31.2 PG (ref 26.6–33)
MCHC RBC AUTO-ENTMCNC: 34.6 G/DL (ref 31.5–35.7)
MCV RBC AUTO: 90 FL (ref 79–97)
PLATELET # BLD AUTO: 342 X10E3/UL (ref 150–450)
RBC # BLD AUTO: 5.74 X10E6/UL (ref 4.14–5.8)
TRIGL SERPL-MCNC: 139 MG/DL (ref 0–149)
TSH SERPL DL<=0.005 MIU/L-ACNC: 3.42 UIU/ML (ref 0.45–4.5)
VLDLC SERPL CALC-MCNC: 24 MG/DL (ref 5–40)
WBC # BLD AUTO: 9.3 X10E3/UL (ref 3.4–10.8)

## 2022-01-27 ENCOUNTER — HOSPITAL ENCOUNTER (OUTPATIENT)
Dept: NON INVASIVE DIAGNOSTICS | Age: 62
Discharge: HOME | End: 2022-01-27
Attending: FAMILY MEDICINE
Payer: MEDICARE

## 2022-01-27 ENCOUNTER — HOSPITAL ENCOUNTER (OUTPATIENT)
Dept: CT IMAGING | Age: 62
Discharge: HOME | End: 2022-01-27
Attending: FAMILY MEDICINE
Payer: MEDICARE

## 2022-01-27 ENCOUNTER — HOSPITAL ENCOUNTER (OUTPATIENT)
Dept: NUCLEAR MEDICINE | Age: 62
Discharge: HOME | End: 2022-01-27
Attending: FAMILY MEDICINE
Payer: MEDICARE

## 2022-01-27 DIAGNOSIS — Z87.891 PERSONAL HISTORY OF TOBACCO USE, PRESENTING HAZARDS TO HEALTH: ICD-10-CM

## 2022-01-27 DIAGNOSIS — F17.219 NICOTINE DEPENDENCE, CIGARETTES, WITH UNSPECIFIED NICOTINE-INDUCED DISORDERS: ICD-10-CM

## 2022-01-27 DIAGNOSIS — R06.09 DOE (DYSPNEA ON EXERTION): ICD-10-CM

## 2022-01-27 DIAGNOSIS — F17.218 NICOTINE DEPENDENCE, CIGARETTES, WITH OTHER NICOTINE-INDUCED DISORDERS: ICD-10-CM

## 2022-01-27 DIAGNOSIS — R06.02 SOB (SHORTNESS OF BREATH) ON EXERTION: ICD-10-CM

## 2022-01-27 LAB
STRESS BASELINE DIAS BP: 104 MMHG
STRESS BASELINE HR: 80 BPM
STRESS BASELINE ST DEPRESSION: 0 MM
STRESS BASELINE SYS BP: 166 MMHG
STRESS ESTIMATED WORKLOAD: 1 METS
STRESS EXERCISE DUR MIN: NORMAL
STRESS O2 SAT PEAK: 96 %
STRESS O2 SAT REST: 99 %
STRESS PEAK DIAS BP: 104 MMHG
STRESS PEAK SYS BP: 166 MMHG
STRESS PERCENT HR ACHIEVED: 58 %
STRESS POST PEAK HR: 92 BPM
STRESS RATE PRESSURE PRODUCT: NORMAL BPM*MMHG
STRESS ST DEPRESSION: 0 MM
STRESS TARGET HR: 158 BPM

## 2022-01-27 PROCEDURE — 74011250636 HC RX REV CODE- 250/636: Performed by: INTERNAL MEDICINE

## 2022-01-27 PROCEDURE — A9500 TC99M SESTAMIBI: HCPCS

## 2022-01-27 PROCEDURE — 71271 CT THORAX LUNG CANCER SCR C-: CPT

## 2022-01-27 PROCEDURE — 93017 CV STRESS TEST TRACING ONLY: CPT

## 2022-01-27 RX ORDER — TETRAKIS(2-METHOXYISOBUTYLISOCYANIDE)COPPER(I) TETRAFLUOROBORATE 1 MG/ML
11 INJECTION, POWDER, LYOPHILIZED, FOR SOLUTION INTRAVENOUS
Status: COMPLETED | OUTPATIENT
Start: 2022-01-27 | End: 2022-01-27

## 2022-01-27 RX ORDER — TETRAKIS(2-METHOXYISOBUTYLISOCYANIDE)COPPER(I) TETRAFLUOROBORATE 1 MG/ML
30 INJECTION, POWDER, LYOPHILIZED, FOR SOLUTION INTRAVENOUS
Status: COMPLETED | OUTPATIENT
Start: 2022-01-27 | End: 2022-01-27

## 2022-01-27 RX ADMIN — TETRAKIS(2-METHOXYISOBUTYLISOCYANIDE)COPPER(I) TETRAFLUOROBORATE 11 MILLICURIE: 1 INJECTION, POWDER, LYOPHILIZED, FOR SOLUTION INTRAVENOUS at 09:15

## 2022-01-27 RX ADMIN — REGADENOSON 0.4 MG: 0.08 INJECTION, SOLUTION INTRAVENOUS at 10:44

## 2022-01-27 RX ADMIN — TETRAKIS(2-METHOXYISOBUTYLISOCYANIDE)COPPER(I) TETRAFLUOROBORATE 30 MILLICURIE: 1 INJECTION, POWDER, LYOPHILIZED, FOR SOLUTION INTRAVENOUS at 12:00

## 2022-02-14 ENCOUNTER — OFFICE VISIT (OUTPATIENT)
Dept: FAMILY MEDICINE CLINIC | Age: 62
End: 2022-02-14
Payer: MEDICARE

## 2022-02-14 VITALS
SYSTOLIC BLOOD PRESSURE: 126 MMHG | BODY MASS INDEX: 31.64 KG/M2 | TEMPERATURE: 97.8 F | WEIGHT: 233.6 LBS | RESPIRATION RATE: 18 BRPM | OXYGEN SATURATION: 95 % | HEIGHT: 72 IN | HEART RATE: 72 BPM | DIASTOLIC BLOOD PRESSURE: 81 MMHG

## 2022-02-14 DIAGNOSIS — Z79.899 ENCOUNTER FOR LONG-TERM (CURRENT) USE OF MEDICATIONS: ICD-10-CM

## 2022-02-14 DIAGNOSIS — R06.09 DOE (DYSPNEA ON EXERTION): Primary | ICD-10-CM

## 2022-02-14 DIAGNOSIS — E11.9 TYPE 2 DIABETES MELLITUS WITHOUT COMPLICATION, WITHOUT LONG-TERM CURRENT USE OF INSULIN (HCC): ICD-10-CM

## 2022-02-14 DIAGNOSIS — E78.2 MIXED HYPERLIPIDEMIA: ICD-10-CM

## 2022-02-14 DIAGNOSIS — R91.1 SOLID NODULE OF LUNG LESS THAN 6 MM IN DIAMETER: ICD-10-CM

## 2022-02-14 DIAGNOSIS — F17.218 NICOTINE DEPENDENCE, CIGARETTES, WITH OTHER NICOTINE-INDUCED DISORDERS: ICD-10-CM

## 2022-02-14 DIAGNOSIS — I10 ESSENTIAL HYPERTENSION: ICD-10-CM

## 2022-02-14 PROCEDURE — 3017F COLORECTAL CA SCREEN DOC REV: CPT | Performed by: FAMILY MEDICINE

## 2022-02-14 PROCEDURE — G8752 SYS BP LESS 140: HCPCS | Performed by: FAMILY MEDICINE

## 2022-02-14 PROCEDURE — G8417 CALC BMI ABV UP PARAM F/U: HCPCS | Performed by: FAMILY MEDICINE

## 2022-02-14 PROCEDURE — 99214 OFFICE O/P EST MOD 30 MIN: CPT | Performed by: FAMILY MEDICINE

## 2022-02-14 PROCEDURE — 2022F DILAT RTA XM EVC RTNOPTHY: CPT | Performed by: FAMILY MEDICINE

## 2022-02-14 PROCEDURE — G8427 DOCREV CUR MEDS BY ELIG CLIN: HCPCS | Performed by: FAMILY MEDICINE

## 2022-02-14 PROCEDURE — G8754 DIAS BP LESS 90: HCPCS | Performed by: FAMILY MEDICINE

## 2022-02-14 PROCEDURE — G8510 SCR DEP NEG, NO PLAN REQD: HCPCS | Performed by: FAMILY MEDICINE

## 2022-02-14 PROCEDURE — 3044F HG A1C LEVEL LT 7.0%: CPT | Performed by: FAMILY MEDICINE

## 2022-02-14 NOTE — PROGRESS NOTES
Osvaldo Montemayor. is a 57 y.o. male    Disability due to back and shoulder    Vaccinated for COVID       has a past medical history of Borderline diabetes, Bradycardia (06/2019), Chronic pain, Colitis (12/3/2018), Degenerative disc disease, lumbar, Essential hypertension (12/3/2018), GERD (gastroesophageal reflux disease), H/O hernia repair (12/11/2018), History of cholecystectomy (12/11/2018), Ill-defined condition, Ill-defined condition, Osteoarthritis of spine with radiculopathy, lumbar region (12/11/2018), and Smoker (12/11/2018). Labs, imaging and nuclear stress test reviewed w pt    Was walking 45 minutes daily for exercise, today report it's been > 1 year of progressive worse. Now can only walk about 5 minutes before out of breath. Denies CP. Denies pedal edema, orthopenea  Denies COVID infection  Work up relatively normal    Wife passed 18 months ago and not walking daily like he used to. He's trying to work out again. Denies anxiety or depression    Tried albuterol prn when SOB, did NOT noticed much difference. We discussed he gained 20lbs, this is his heaviest weight. It was safe to resume exercise and slowly wean up. If not improving slowly to see us sooner. Smoker. 1ppd since 11yoa, 50+ packs years. Discuss cessation. He started wellbutrin 2 weeks ago and is now down to 4 cigs per day. CT lungs low dose; Small pulmonary nodules measuring up to 4 mm as detailed above. Lung-RADS Category: 2, benign appearance or behavior  Management recommendation: Follow-up low-dose screening CT in one year. DM2: a1C 5.7% 1/2022, 5.9% 09/2021, 6.7% 4/2021, 6.5% 1/2021, 6.1% 06/2020, 6.1% 12/2019, 6.2% 05/2019  Discussed pathophysiology and risk, complication.   Dieting   Discussed blood glucose monitoring  metformin 1/2 BID or hold it prn.   Continue course     HTN: BP at goal  Continue amlodipine     HLD: lipitor 10mg      BPH: PSA normal - urinary hessitency and dribling. flomax      Reviewed: active problem list, medication list, allergies, notes from last encounter, lab results    A comprehensive review of systems was negative except for that written in the HPI. Allergies   Allergen Reactions    Aspirin Other (comments)     Nose bleeds    Morphine Other (comments)     Agitation     Current Outpatient Medications on File Prior to Visit   Medication Sig Dispense Refill    buPROPion XL (WELLBUTRIN XL) 150 mg tablet Take 150 mg by mouth daily.  albuterol (PROVENTIL HFA, VENTOLIN HFA, PROAIR HFA) 90 mcg/actuation inhaler Take 1 Puff by inhalation every four (4) hours as needed for Wheezing. 18 g 1    atorvastatin (LIPITOR) 10 mg tablet TAKE 1 TABLET EVERY DAY 90 Tablet 1    amLODIPine (NORVASC) 5 mg tablet TAKE 1 TABLET EVERY DAY 90 Tablet 1    tamsulosin (FLOMAX) 0.4 mg capsule Take 1 Capsule by mouth daily. 90 Capsule 1    naproxen (NAPROSYN) 500 mg tablet Take 1 Tablet by mouth two (2) times daily as needed for Pain. 30 Tablet 2    glucose blood VI test strips (True Metrix Glucose Test Strip) strip TEST ONE TIME DAILY 100 Strip 1    lancets (TRUEplus Lancets) 33 gauge misc TEST EVERY  Each 1    cyclobenzaprine (FLEXERIL) 10 mg tablet Take 1 Tablet by mouth nightly as needed for Muscle Spasm(s). 14 Tablet 0    lancets misc Check once daily 100 Each 1    glucose blood VI test strips (ASCENSIA AUTODISC VI, ONE TOUCH ULTRA TEST VI) strip Check once daily 90 Strip 1    metFORMIN (GLUCOPHAGE) 500 mg tablet Take 0.5 Tablets by mouth two (2) times daily (with meals). (Patient not taking: Reported on 2/14/2022) 90 Tablet 1     No current facility-administered medications on file prior to visit.      Patient Active Problem List   Diagnosis Code    Colitis K52.9    Essential hypertension I10    Osteoarthritis of spine with radiculopathy, lumbar region M47.26    Smoker F17.200    History of cholecystectomy Z90.49    H/O hernia repair Z98.890, Z87.19    Type 2 diabetes mellitus E11.9       Visit Vitals  /81   Pulse 72   Temp 97.8 °F (36.6 °C) (Temporal)   Resp 18   Ht 6' (1.829 m)   Wt 233 lb 9.6 oz (106 kg)   SpO2 95%   BMI 31.68 kg/m²     General appearance: alert, cooperative, no distress, appears stated age  Neurologic: Alert and oriented X 3, normal strength and tone, symmetric. Normal without focal findings. Cranial nerves 2-12 intact. Normal coordination and gait. Mental status: Alert, oriented, thought content appropriate, affect: stable, mood-congruent. Head: Normocephalic, without obvious abnormality, atraumatic  Eyes: conjunctivae/corneas clear. PERRL, EOM's intact. Neck: supple, symmetrical, trachea midline, no JVD  Lungs: clear to auscultation bilaterally  Heart: regular rate and rhythm, S1, S2 normal, no murmur, click, rub or gallop  Abdomen: soft, non-tender. Extremities: extremities normal, atraumatic, no cyanosis or edema      Assessment/Plans:    Diagnoses and all orders for this visit:    1. YANG (dyspnea on exertion)  -     HEMOGLOBIN A1C WITH EAG; Future  -     METABOLIC PANEL, BASIC; Future  -     HEPATIC FUNCTION PANEL; Future  -     HGB & HCT; Future    2. Solid nodule of lung less than 6 mm in diameter    3. Nicotine dependence, cigarettes, with other nicotine-induced disorders    4. Type 2 diabetes mellitus without complication, without long-term current use of insulin (HCC)  -     HEMOGLOBIN A1C WITH EAG; Future  -     METABOLIC PANEL, BASIC; Future  -     HEPATIC FUNCTION PANEL; Future  -     HGB & HCT; Future    5. Essential hypertension  -     METABOLIC PANEL, BASIC; Future  -     HEPATIC FUNCTION PANEL; Future    6. Mixed hyperlipidemia  -     METABOLIC PANEL, BASIC; Future  -     HEPATIC FUNCTION PANEL; Future    7. Encounter for long-term (current) use of medications  -     HEMOGLOBIN A1C WITH EAG; Future  -     METABOLIC PANEL, BASIC; Future  -     HEPATIC FUNCTION PANEL; Future  -     HGB & HCT;  Future      Discussed plans, risk/benefits of treatments/observations. Through the use of shared decision making, above plans were agreed upon. Medication compliance advised. Patient verbalized understanding. Follow-up and Dispositions    · Return in about 4 months (around 6/14/2022) for DM2, HTN, HLD, sooner if not better or as needed. Mabel Thurston MD  2/14/2022     Discussed with patient current guidelines for screening for lung cancer. Current recommendations are to obtain yearly screening LDCT yearly for age 46-80, or until smoke free for 15 years. Patient has 50 pack year history of cigarette smoking and currently smoking but is cutting down. Discussed with patient risks and benefits of screening, including over-diagnosis, false positive rate, and total radiation exposure. Patient currently exhibits no signs or symptoms suggestive of lung cancer. Discussed with patient importance of compliance with yearly annual lung cancer screenings and willingness to undergo diagnosis and treatment if screening scan is positive. In addition, patient was counseled regarding (remaining smoke free/total smoking cessation).

## 2022-02-14 NOTE — PROGRESS NOTES
Chief Complaint   Patient presents with    Results       1. Have you been to the ER, urgent care clinic since your last visit? Hospitalized since your last visit? No     2. Have you seen or consulted any other health care providers outside of the [address] [place] [address] since your last visit? Include any pap smears or colon screening.  No

## 2022-02-19 DIAGNOSIS — E11.9 CONTROLLED TYPE 2 DIABETES MELLITUS WITHOUT COMPLICATION, WITHOUT LONG-TERM CURRENT USE OF INSULIN (HCC): ICD-10-CM

## 2022-02-28 RX ORDER — LANCETS 33 GAUGE
EACH MISCELLANEOUS
Qty: 100 EACH | Refills: 1 | Status: SHIPPED | OUTPATIENT
Start: 2022-02-28 | End: 2022-09-20

## 2022-02-28 RX ORDER — METFORMIN HYDROCHLORIDE 500 MG/1
TABLET ORAL
Qty: 90 TABLET | Refills: 1 | Status: SHIPPED | OUTPATIENT
Start: 2022-02-28 | End: 2022-09-01

## 2022-02-28 RX ORDER — ISOPROPYL ALCOHOL 70 ML/100ML
SWAB TOPICAL
Qty: 100 PAD | Refills: 1 | Status: SHIPPED | OUTPATIENT
Start: 2022-02-28 | End: 2022-09-20

## 2022-02-28 RX ORDER — CALCIUM CITRATE/VITAMIN D3 200MG-6.25
TABLET ORAL
Qty: 100 STRIP | Refills: 1 | Status: SHIPPED | OUTPATIENT
Start: 2022-02-28 | End: 2022-09-20

## 2022-03-18 PROBLEM — I10 ESSENTIAL HYPERTENSION: Status: ACTIVE | Noted: 2018-12-03

## 2022-03-19 PROBLEM — Z87.19 H/O HERNIA REPAIR: Status: ACTIVE | Noted: 2018-12-11

## 2022-03-19 PROBLEM — Z98.890 H/O HERNIA REPAIR: Status: ACTIVE | Noted: 2018-12-11

## 2022-03-19 PROBLEM — F17.200 SMOKER: Status: ACTIVE | Noted: 2018-12-11

## 2022-03-19 PROBLEM — E11.9 TYPE 2 DIABETES MELLITUS (HCC): Status: ACTIVE | Noted: 2022-01-17

## 2022-03-19 PROBLEM — Z90.49 HISTORY OF CHOLECYSTECTOMY: Status: ACTIVE | Noted: 2018-12-11

## 2022-03-19 PROBLEM — M47.26 OSTEOARTHRITIS OF SPINE WITH RADICULOPATHY, LUMBAR REGION: Status: ACTIVE | Noted: 2018-12-11

## 2022-03-19 PROBLEM — K52.9 COLITIS: Status: ACTIVE | Noted: 2018-12-03

## 2022-04-15 DIAGNOSIS — R52 PAIN: Primary | ICD-10-CM

## 2022-04-18 DIAGNOSIS — M54.50 CHRONIC BILATERAL LOW BACK PAIN WITHOUT SCIATICA: Primary | ICD-10-CM

## 2022-04-18 DIAGNOSIS — G89.29 CHRONIC BILATERAL LOW BACK PAIN WITHOUT SCIATICA: Primary | ICD-10-CM

## 2022-04-29 ENCOUNTER — TELEPHONE (OUTPATIENT)
Dept: FAMILY MEDICINE CLINIC | Age: 62
End: 2022-04-29

## 2022-04-29 NOTE — TELEPHONE ENCOUNTER
Patient called, stating he has been to the beach and started breaking out 1 week ago with a rash from the top of his legs all around them  to his hips    He states he had this same problem this time last year and Dr Albetr Manning gave him a rx and it helped    He does have a few bumps on his arms    He is allergic to chiggers    He has tried Desitin and chigger x-power a cream  This has not helped    Can someone prescribe something for him    He is aware Dr Albert Manning is out of the office today    Patient's phone  [phone]    [facility]

## 2022-05-02 ENCOUNTER — OFFICE VISIT (OUTPATIENT)
Dept: FAMILY MEDICINE CLINIC | Age: 62
End: 2022-05-02
Payer: MEDICARE

## 2022-05-02 VITALS
HEART RATE: 107 BPM | RESPIRATION RATE: 18 BRPM | OXYGEN SATURATION: 95 % | WEIGHT: 224.2 LBS | SYSTOLIC BLOOD PRESSURE: 116 MMHG | DIASTOLIC BLOOD PRESSURE: 72 MMHG | TEMPERATURE: 98.4 F | HEIGHT: 72 IN | BODY MASS INDEX: 30.37 KG/M2

## 2022-05-02 DIAGNOSIS — L29.9 ITCHING: ICD-10-CM

## 2022-05-02 DIAGNOSIS — Z91.09 ENVIRONMENTAL ALLERGIES: Primary | ICD-10-CM

## 2022-05-02 DIAGNOSIS — L30.9 ECZEMA, UNSPECIFIED TYPE: ICD-10-CM

## 2022-05-02 PROCEDURE — G8417 CALC BMI ABV UP PARAM F/U: HCPCS | Performed by: FAMILY MEDICINE

## 2022-05-02 PROCEDURE — 3017F COLORECTAL CA SCREEN DOC REV: CPT | Performed by: FAMILY MEDICINE

## 2022-05-02 PROCEDURE — G8754 DIAS BP LESS 90: HCPCS | Performed by: FAMILY MEDICINE

## 2022-05-02 PROCEDURE — 99213 OFFICE O/P EST LOW 20 MIN: CPT | Performed by: FAMILY MEDICINE

## 2022-05-02 PROCEDURE — G8752 SYS BP LESS 140: HCPCS | Performed by: FAMILY MEDICINE

## 2022-05-02 PROCEDURE — G8427 DOCREV CUR MEDS BY ELIG CLIN: HCPCS | Performed by: FAMILY MEDICINE

## 2022-05-02 PROCEDURE — G8510 SCR DEP NEG, NO PLAN REQD: HCPCS | Performed by: FAMILY MEDICINE

## 2022-05-02 RX ORDER — PREDNISONE 10 MG/1
TABLET ORAL
Qty: 21 TABLET | Refills: 0 | Status: SHIPPED | OUTPATIENT
Start: 2022-05-02 | End: 2022-05-13 | Stop reason: SDUPTHER

## 2022-05-02 NOTE — PROGRESS NOTES
Chief Complaint   Patient presents with    Rash       1. Have you been to the ER, urgent care clinic since your last visit? Hospitalized since your last visit? No     2. Have you seen or consulted any other health care providers outside of the [address] [place] [address] since your last visit? Include any pap smears or colon screening.  No

## 2022-05-02 NOTE — PROGRESS NOTES
Renée Irizarry is a 60 y.o. male    Disability due to back and shoulder    Wife passed in 2020  Have 2 kids    Vaccinated for Brandi       has a past medical history of Borderline diabetes, Bradycardia (06/2019), Chronic pain, Colitis (12/3/2018), Degenerative disc disease, lumbar, Essential hypertension (12/3/2018), GERD (gastroesophageal reflux disease), H/O hernia repair (12/11/2018), History of cholecystectomy (12/11/2018), Ill-defined condition, Ill-defined condition, Osteoarthritis of spine with radiculopathy, lumbar region (12/11/2018), and Smoker (12/11/2018). X 2 weeks of itching, working in the yard and walking on [employer] prednisone 10mg taper    Nuclear stress test 1/27/22 relatively normal  His YANG is improving. He's almost quit smoking, now 5cigs per day  Lost 9 lbs    Smoker. 1ppd since 11yoa, 50+ packs years. Discuss cessation. He started wellbutrin 2 weeks ago and is now down to 4 cigs per day. CT lungs low dose; Small pulmonary nodules measuring up to 4 mm as detailed above. Lung-RADS Category: 2, benign appearance or behavior  Management recommendation: Follow-up low-dose screening CT in one year. Reviewed: active problem list, medication list, allergies, notes from last encounter, lab results    A comprehensive review of systems was negative except for that written in the HPI. Allergies   Allergen Reactions    Aspirin Other (comments)     Nose bleeds    Morphine Other (comments)     Agitation     Current Outpatient Medications on File Prior to Visit   Medication Sig Dispense Refill    alcohol swabs (BD Single Use Swabs Regular) padm TEST EVERY  Pad 1    lancets (TRUEplus Lancets) 33 gauge misc TEST EVERY  Each 1    True Metrix Glucose Test Strip strip TEST ONE TIME DAILY 100 Strip 1    albuterol (PROVENTIL HFA, VENTOLIN HFA, PROAIR HFA) 90 mcg/actuation inhaler Take 1 Puff by inhalation every four (4) hours as needed for Wheezing.  18 g 1    atorvastatin (LIPITOR) 10 mg tablet TAKE 1 TABLET EVERY DAY 90 Tablet 1    amLODIPine (NORVASC) 5 mg tablet TAKE 1 TABLET EVERY DAY 90 Tablet 1    tamsulosin (FLOMAX) 0.4 mg capsule Take 1 Capsule by mouth daily. 90 Capsule 1    naproxen (NAPROSYN) 500 mg tablet Take 1 Tablet by mouth two (2) times daily as needed for Pain. 30 Tablet 2    lancets misc Check once daily 100 Each 1    glucose blood VI test strips (ASCENSIA AUTODISC VI, ONE TOUCH ULTRA TEST VI) strip Check once daily 90 Strip 1    metFORMIN (GLUCOPHAGE) 500 mg tablet TAKE 1/2 TABLET TWICE DAILY WITH MEALS (Patient not taking: Reported on 5/2/2022) 90 Tablet 1    buPROPion XL (WELLBUTRIN XL) 150 mg tablet Take 150 mg by mouth daily. (Patient not taking: Reported on 5/2/2022)      cyclobenzaprine (FLEXERIL) 10 mg tablet Take 1 Tablet by mouth nightly as needed for Muscle Spasm(s). (Patient not taking: Reported on 5/2/2022) 14 Tablet 0     No current facility-administered medications on file prior to visit. Patient Active Problem List   Diagnosis Code    Colitis K52.9    Essential hypertension I10    Osteoarthritis of spine with radiculopathy, lumbar region M47.26    Smoker F17.200    History of cholecystectomy Z90.49    H/O hernia repair Z98.890, Z87.19    Type 2 diabetes mellitus E11.9       Visit Vitals  /72   Pulse (!) 107   Temp 98.4 °F (36.9 °C) (Temporal)   Resp 18   Ht 6' (1.829 m)   Wt 224 lb 3.2 oz (101.7 kg)   SpO2 95%   BMI 30.41 kg/m²     General appearance: alert, cooperative, no distress, appears stated age  Neurologic: Alert and oriented X 3, normal strength and tone, symmetric. Normal without focal findings. Cranial nerves 2-12 intact. Normal coordination and gait. Mental status: Alert, oriented, thought content appropriate, affect: stable, mood-congruent. Head: Normocephalic, without obvious abnormality, atraumatic  Eyes: conjunctivae/corneas clear. PERRL, EOM's intact.    Neck: supple, symmetrical, trachea midline, no JVD  Lungs: clear to auscultation bilaterally  Heart: regular rate and rhythm, S1, S2 normal, no murmur, click, rub or gallop  Abdomen: soft, non-tender. Extremities: eczema worse around bilat thighs, spots in legs, arms and abdo      Assessment/Plans:     Diagnoses and all orders for this visit:    1. Environmental allergies  -     predniSONE (STERAPRED DS) 10 mg dose pack; See administration instruction per 10mg dose pack    2. Itching  -     predniSONE (STERAPRED DS) 10 mg dose pack; See administration instruction per 10mg dose pack    3. Eczema, unspecified type  -     predniSONE (STERAPRED DS) 10 mg dose pack; See administration instruction per 10mg dose pack      Discussed plans, risk/benefits of treatments/observations. Through the use of shared decision making, above plans were agreed upon. Medication compliance advised. Patient verbalized understanding. Follow-up and Dispositions    · Return in about 6 weeks (around 6/13/2022) for chronic medical issues.          Vicki Ulloa MD  5/2/2022

## 2022-05-13 DIAGNOSIS — L29.9 ITCHING: ICD-10-CM

## 2022-05-13 DIAGNOSIS — L30.9 ECZEMA, UNSPECIFIED TYPE: ICD-10-CM

## 2022-05-13 DIAGNOSIS — Z91.09 ENVIRONMENTAL ALLERGIES: ICD-10-CM

## 2022-05-13 NOTE — TELEPHONE ENCOUNTER
Pt states he is taking:   predniSONE (STERAPRED DS) 10 mg dose pack       He wants another dose - states it cleared up some but hasnt gone away       [facility] number to reach him is [phone]

## 2022-05-16 RX ORDER — PREDNISONE 10 MG/1
TABLET ORAL
Qty: 21 TABLET | Refills: 0 | Status: SHIPPED | OUTPATIENT
Start: 2022-05-16 | End: 2022-05-31 | Stop reason: ALTCHOICE

## 2022-05-31 ENCOUNTER — VIRTUAL VISIT (OUTPATIENT)
Dept: FAMILY MEDICINE CLINIC | Age: 62
End: 2022-05-31
Payer: MEDICARE

## 2022-05-31 DIAGNOSIS — T78.40XA ALLERGIC REACTION, INITIAL ENCOUNTER: ICD-10-CM

## 2022-05-31 DIAGNOSIS — B88.0 CHIGGER BITES: Primary | ICD-10-CM

## 2022-05-31 PROCEDURE — 99441 PR PHYS/QHP TELEPHONE EVALUATION 5-10 MIN: CPT | Performed by: FAMILY MEDICINE

## 2022-05-31 RX ORDER — HYDROXYZINE 25 MG/1
25 TABLET, FILM COATED ORAL
Qty: 15 TABLET | Refills: 0 | Status: SHIPPED | OUTPATIENT
Start: 2022-05-31 | End: 2022-06-10

## 2022-05-31 RX ORDER — MOMETASONE FUROATE 1 MG/G
OINTMENT TOPICAL DAILY
Qty: 15 G | Refills: 0 | Status: SHIPPED | OUTPATIENT
Start: 2022-05-31 | End: 2022-06-13 | Stop reason: SDUPTHER

## 2022-05-31 RX ORDER — PREDNISONE 10 MG/1
TABLET ORAL
Qty: 21 TABLET | Refills: 0 | Status: SHIPPED | OUTPATIENT
Start: 2022-05-31 | End: 2022-06-13 | Stop reason: ALTCHOICE

## 2022-05-31 NOTE — PROGRESS NOTES
Donnie Worthington. is a 60 y.o. male evaluated via telephone on 5/31/2022. Consent:  He and/or health care decision maker is aware that he may receive a bill for this telephone service, depending on his insurance coverage, and has provided verbal consent to proceed: Yes      Documentation:  I communicated with the patient and/or health care decision maker about;   Details of this discussion including any medical advice provided:      has a past medical history of Borderline diabetes, Bradycardia (06/2019), Chronic pain, Colitis (12/3/2018), Degenerative disc disease, lumbar, Essential hypertension (12/3/2018), GERD (gastroesophageal reflux disease), H/O hernia repair (12/11/2018), History of cholecystectomy (12/11/2018), Ill-defined condition, Ill-defined condition, Osteoarthritis of spine with radiculopathy, lumbar region (12/11/2018), and Smoker (12/11/2018). Started 2 days ago, found 2 chiggers, one on his ankle, one on wrist. Red small. Hx of allergies to chiggers bites, was admitted in hospital in the past for them. Prednisone  Hydroxyzine      Diagnoses and all orders for this visit:    1. Chigger bites  -     mometasone (ELOCON) 0.1 % ointment; Apply  to affected area daily. -     predniSONE (STERAPRED DS) 10 mg dose pack; See administration instruction per 10mg dose pack  -     hydrOXYzine HCL (ATARAX) 25 mg tablet; Take 1 Tablet by mouth three (3) times daily as needed for Itching for up to 10 days. Can make you drowsy and sleepy    2. Allergic reaction, initial encounter  -     mometasone (ELOCON) 0.1 % ointment; Apply  to affected area daily. -     predniSONE (STERAPRED DS) 10 mg dose pack; See administration instruction per 10mg dose pack  -     hydrOXYzine HCL (ATARAX) 25 mg tablet; Take 1 Tablet by mouth three (3) times daily as needed for Itching for up to 10 days.  Can make you drowsy and sleepy      Follow-up and Dispositions    · Return in about 3 days (around 6/3/2022), or if symptoms worsen or fail to improve. Past Medical History:   Diagnosis Date    Borderline diabetes     Bradycardia 06/2019    per pt they changed BP med and all is resolved; Dr. Peyton Bedoya Chronic pain     back    Colitis 12/3/2018    Degenerative disc disease, lumbar     Essential hypertension 12/3/2018    GERD (gastroesophageal reflux disease)     hiatal hernia    H/O hernia repair 12/11/2018    History of cholecystectomy 12/11/2018    Ill-defined condition     ruptured gall bladder    Ill-defined condition     hx of decreased O2 sats\" per pt he has been in the 90's and had to have a breathing tx prior to back injection\"     Osteoarthritis of spine with radiculopathy, lumbar region 12/11/2018    Smoker 12/11/2018       Current Outpatient Medications on File Prior to Visit   Medication Sig Dispense Refill    alcohol swabs (BD Single Use Swabs Regular) padm TEST EVERY  Pad 1    lancets (TRUEplus Lancets) 33 gauge misc TEST EVERY  Each 1    True Metrix Glucose Test Strip strip TEST ONE TIME DAILY 100 Strip 1    albuterol (PROVENTIL HFA, VENTOLIN HFA, PROAIR HFA) 90 mcg/actuation inhaler Take 1 Puff by inhalation every four (4) hours as needed for Wheezing. 18 g 1    atorvastatin (LIPITOR) 10 mg tablet TAKE 1 TABLET EVERY DAY 90 Tablet 1    amLODIPine (NORVASC) 5 mg tablet TAKE 1 TABLET EVERY DAY 90 Tablet 1    tamsulosin (FLOMAX) 0.4 mg capsule Take 1 Capsule by mouth daily. 90 Capsule 1    naproxen (NAPROSYN) 500 mg tablet Take 1 Tablet by mouth two (2) times daily as needed for Pain. 30 Tablet 2    lancets misc Check once daily 100 Each 1    glucose blood VI test strips (ASCENSIA AUTODISC VI, ONE TOUCH ULTRA TEST VI) strip Check once daily 90 Strip 1    metFORMIN (GLUCOPHAGE) 500 mg tablet TAKE 1/2 TABLET TWICE DAILY WITH MEALS (Patient not taking: Reported on 5/2/2022) 90 Tablet 1    buPROPion XL (WELLBUTRIN XL) 150 mg tablet Take 150 mg by mouth daily.  (Patient not taking: Reported on 5/2/2022)       No current facility-administered medications on file prior to visit. I affirm this is a Patient Initiated Episode with a Patient who has not had a related appointment within my department in the past 7 days or scheduled within the next 24 hours.     Total Time: minutes: 5-10 minutes    Note: not billable if this call serves to triage the patient into an appointment for the relevant concern      Alisha Stoll MD

## 2022-05-31 NOTE — PROGRESS NOTES
Chief Complaint   Patient presents with   [place] now broken out all over       1. Have you been to the ER, urgent care clinic since your last visit? Hospitalized since your last visit? No     2. Have you seen or consulted any other health care providers outside of the [address] [place] [address] since your last visit? Include any pap smears or colon screening.  No

## 2022-06-13 ENCOUNTER — OFFICE VISIT (OUTPATIENT)
Dept: FAMILY MEDICINE CLINIC | Age: 62
End: 2022-06-13
Payer: MEDICARE

## 2022-06-13 VITALS
BODY MASS INDEX: 31.04 KG/M2 | DIASTOLIC BLOOD PRESSURE: 75 MMHG | HEIGHT: 72 IN | WEIGHT: 229.2 LBS | HEART RATE: 80 BPM | SYSTOLIC BLOOD PRESSURE: 113 MMHG | RESPIRATION RATE: 18 BRPM | TEMPERATURE: 98.4 F | OXYGEN SATURATION: 97 %

## 2022-06-13 DIAGNOSIS — N40.1 BENIGN PROSTATIC HYPERPLASIA WITH URINARY HESITANCY: ICD-10-CM

## 2022-06-13 DIAGNOSIS — F17.218 NICOTINE DEPENDENCE, CIGARETTES, WITH OTHER NICOTINE-INDUCED DISORDERS: ICD-10-CM

## 2022-06-13 DIAGNOSIS — M54.50 CHRONIC BILATERAL LOW BACK PAIN WITHOUT SCIATICA: ICD-10-CM

## 2022-06-13 DIAGNOSIS — T78.40XD ALLERGIC REACTION, SUBSEQUENT ENCOUNTER: ICD-10-CM

## 2022-06-13 DIAGNOSIS — Z79.899 ENCOUNTER FOR LONG-TERM (CURRENT) USE OF MEDICATIONS: ICD-10-CM

## 2022-06-13 DIAGNOSIS — E78.2 MIXED HYPERLIPIDEMIA: ICD-10-CM

## 2022-06-13 DIAGNOSIS — I10 ESSENTIAL HYPERTENSION: ICD-10-CM

## 2022-06-13 DIAGNOSIS — R39.11 BENIGN PROSTATIC HYPERPLASIA WITH URINARY HESITANCY: ICD-10-CM

## 2022-06-13 DIAGNOSIS — G89.29 CHRONIC BILATERAL LOW BACK PAIN WITHOUT SCIATICA: ICD-10-CM

## 2022-06-13 DIAGNOSIS — E11.9 CONTROLLED TYPE 2 DIABETES MELLITUS WITHOUT COMPLICATION, WITHOUT LONG-TERM CURRENT USE OF INSULIN (HCC): Primary | ICD-10-CM

## 2022-06-13 PROBLEM — F11.99 OPIOID USE, UNSPECIFIED WITH UNSPECIFIED OPIOID-INDUCED DISORDER (HCC): Status: ACTIVE | Noted: 2022-06-13

## 2022-06-13 PROCEDURE — G8427 DOCREV CUR MEDS BY ELIG CLIN: HCPCS | Performed by: FAMILY MEDICINE

## 2022-06-13 PROCEDURE — G8417 CALC BMI ABV UP PARAM F/U: HCPCS | Performed by: FAMILY MEDICINE

## 2022-06-13 PROCEDURE — 3017F COLORECTAL CA SCREEN DOC REV: CPT | Performed by: FAMILY MEDICINE

## 2022-06-13 PROCEDURE — G8510 SCR DEP NEG, NO PLAN REQD: HCPCS | Performed by: FAMILY MEDICINE

## 2022-06-13 PROCEDURE — 3044F HG A1C LEVEL LT 7.0%: CPT | Performed by: FAMILY MEDICINE

## 2022-06-13 PROCEDURE — 2022F DILAT RTA XM EVC RTNOPTHY: CPT | Performed by: FAMILY MEDICINE

## 2022-06-13 PROCEDURE — G8752 SYS BP LESS 140: HCPCS | Performed by: FAMILY MEDICINE

## 2022-06-13 PROCEDURE — G8754 DIAS BP LESS 90: HCPCS | Performed by: FAMILY MEDICINE

## 2022-06-13 PROCEDURE — 99214 OFFICE O/P EST MOD 30 MIN: CPT | Performed by: FAMILY MEDICINE

## 2022-06-13 RX ORDER — MOMETASONE FUROATE 1 MG/G
OINTMENT TOPICAL DAILY
Qty: 45 G | Refills: 0 | Status: SHIPPED | OUTPATIENT
Start: 2022-06-13

## 2022-06-13 RX ORDER — ALBUTEROL SULFATE 90 UG/1
1 AEROSOL, METERED RESPIRATORY (INHALATION)
Qty: 18 G | Refills: 1 | Status: SHIPPED | OUTPATIENT
Start: 2022-06-13

## 2022-06-13 RX ORDER — PREDNISONE 10 MG/1
TABLET ORAL
Qty: 24 TABLET | Refills: 0 | Status: SHIPPED | OUTPATIENT
Start: 2022-06-13 | End: 2022-09-28 | Stop reason: ALTCHOICE

## 2022-06-13 RX ORDER — ATORVASTATIN CALCIUM 10 MG/1
TABLET, FILM COATED ORAL
Qty: 90 TABLET | Refills: 1 | Status: SHIPPED | OUTPATIENT
Start: 2022-06-13 | End: 2022-09-28 | Stop reason: SDUPTHER

## 2022-06-13 RX ORDER — NAPROXEN 500 MG/1
500 TABLET ORAL
Qty: 30 TABLET | Refills: 2 | Status: SHIPPED | OUTPATIENT
Start: 2022-06-13 | End: 2022-08-16 | Stop reason: SDUPTHER

## 2022-06-13 RX ORDER — TAMSULOSIN HYDROCHLORIDE 0.4 MG/1
0.4 CAPSULE ORAL DAILY
Qty: 90 CAPSULE | Refills: 1 | Status: SHIPPED | OUTPATIENT
Start: 2022-06-13 | End: 2022-09-28

## 2022-06-13 RX ORDER — AMLODIPINE BESYLATE 5 MG/1
TABLET ORAL
Qty: 90 TABLET | Refills: 1 | Status: SHIPPED | OUTPATIENT
Start: 2022-06-13 | End: 2022-09-28 | Stop reason: SDUPTHER

## 2022-06-13 NOTE — PROGRESS NOTES
Bartolo Henson is a 60 y.o. male    Disability due to back and shoulder    Vaccinated for COVID       has a past medical history of Borderline diabetes, Bradycardia (06/2019), Chronic pain, Colitis (12/3/2018), Degenerative disc disease, lumbar, Essential hypertension (12/3/2018), GERD (gastroesophageal reflux disease), H/O hernia repair (12/11/2018), History of cholecystectomy (12/11/2018), Ill-defined condition, Ill-defined condition, Osteoarthritis of spine with radiculopathy, lumbar region (12/11/2018), and Smoker (12/11/2018). Have been working now on repainting his house exterior. 2 weeks ago saw us for what he thought was chigger bites. After the prednisone course it cleared up, 2 days later he has a fresh outbreak of skin reaction around his mid bodies. Overall he's getting a few bites sandie here and there. But he doesn't believe they're in his bed or in the house currently no bite marks can be see but a general allergic reaction. He even got rid of his cough and recliner b/c they're old and also just incase there were something in there. We'll do lower dose extended prednisone course     Smoker. 1ppd since 11yoa, 50+ packs years. Discuss cessation. He started wellbutrin 2 weeks ago and is now down to 4 cigs per day. CT lungs low dose; Small pulmonary nodules measuring up to 4 mm as detailed above. Lung-RADS Category: 2, benign appearance or behavior  Management recommendation: Follow-up low-dose screening CT in one year. Tried wellbutrin but he says makes him feels depressed when taking it, so haven't been.   Still smoking 5cigs daily    DM2: a1C 6.1% 6/2022, 5.7% 1/2022, 5.9% 09/2021, 6.7% 4/2021, 6.5% 1/2021, 6.1% 06/2020, 6.1% 12/2019, 6.2% 05/2019  Discussed pathophysiology and risk, complication.   Dieting   Discussed blood glucose monitoring  metformin 1/2 BID or hold it prn.   Continue course     HTN: BP at goal  Continue amlodipine     HLD: lipitor 10mg      BPH: PSA normal - urinary hessitency and dribling. flomax      Reviewed: active problem list, medication list, allergies, notes from last encounter, lab results    A comprehensive review of systems was negative except for that written in the HPI. Allergies   Allergen Reactions    Aspirin Other (comments)     Nose bleeds    Morphine Other (comments)     Agitation     Current Outpatient Medications on File Prior to Visit   Medication Sig Dispense Refill    alcohol swabs (BD Single Use Swabs Regular) padm TEST EVERY  Pad 1    lancets (TRUEplus Lancets) 33 gauge misc TEST EVERY  Each 1    True Metrix Glucose Test Strip strip TEST ONE TIME DAILY 100 Strip 1    lancets misc Check once daily 100 Each 1    glucose blood VI test strips (ASCENSIA AUTODISC VI, ONE TOUCH ULTRA TEST VI) strip Check once daily 90 Strip 1    metFORMIN (GLUCOPHAGE) 500 mg tablet TAKE 1/2 TABLET TWICE DAILY WITH MEALS (Patient not taking: Reported on 5/2/2022) 90 Tablet 1     No current facility-administered medications on file prior to visit. Patient Active Problem List   Diagnosis Code    Colitis K52.9    Essential hypertension I10    Osteoarthritis of spine with radiculopathy, lumbar region M47.26    Smoker F17.200    History of cholecystectomy Z90.49    H/O hernia repair Z98.890, Z87.19    Type 2 diabetes mellitus E11.9    Opioid use, unspecified with unspecified opioid-induced disorder F11.99       Visit Vitals  /75   Pulse 80   Temp 98.4 °F (36.9 °C) (Temporal)   Resp 18   Ht 6' (1.829 m)   Wt 229 lb 3.2 oz (104 kg)   SpO2 97%   BMI 31.09 kg/m²     General appearance: alert, cooperative, no distress, appears stated age  Neurologic: Alert and oriented X 3, normal strength and tone, symmetric. Normal without focal findings. Cranial nerves 2-12 intact. Normal coordination and gait. Mental status: Alert, oriented, thought content appropriate, affect: stable, mood-congruent.     Head: Normocephalic, without obvious abnormality, atraumatic  Eyes: conjunctivae/corneas clear. PERRL, EOM's intact. Neck: supple, symmetrical, trachea midline, no JVD  Lungs: clear to auscultation bilaterally  Heart: regular rate and rhythm, S1, S2 normal, no murmur, click, rub or gallop  Abdomen: soft, non-tender. Extremities: extremities normal, atraumatic, no cyanosis or edema    Skin: area around mid lower abdomen eczematous. There are random spot in his arm and lower legs. Assessment/Plans:    Diagnoses and all orders for this visit:    1. Controlled type 2 diabetes mellitus without complication, without long-term current use of insulin (HCC)  -     METABOLIC PANEL, COMPREHENSIVE; Future  -     HEMOGLOBIN A1C WITH EAG; Future  -     TSH 3RD GENERATION; Future    2. Allergic reaction, subsequent encounter  -     mometasone (ELOCON) 0.1 % ointment; Apply  to affected area daily. Apply  to affected area daily. -     predniSONE (DELTASONE) 10 mg tablet; 3 tabs daily for 4 days, then 2 tabs daily for 4 days, then 1 tab daily for 4 days. 3. Nicotine dependence, cigarettes, with other nicotine-induced disorders  -     albuterol (PROVENTIL HFA, VENTOLIN HFA, PROAIR HFA) 90 mcg/actuation inhaler; Take 1 Puff by inhalation every four (4) hours as needed for Wheezing. 4. Essential hypertension  -     amLODIPine (NORVASC) 5 mg tablet; TAKE 1 TABLET EVERY DAY  -     METABOLIC PANEL, COMPREHENSIVE; Future  -     TSH 3RD GENERATION; Future    5. Mixed hyperlipidemia  -     atorvastatin (LIPITOR) 10 mg tablet; TAKE 1 TABLET EVERY DAY  -     METABOLIC PANEL, COMPREHENSIVE; Future  -     TSH 3RD GENERATION; Future    6. Benign prostatic hyperplasia with urinary hesitancy  -     tamsulosin (FLOMAX) 0.4 mg capsule; Take 1 Capsule by mouth daily. 7. Chronic bilateral low back pain without sciatica  -     naproxen (NAPROSYN) 500 mg tablet; Take 1 Tablet by mouth two (2) times daily as needed for Pain.     8. Encounter for long-term (current) use of medications  -     METABOLIC PANEL, COMPREHENSIVE; Future  -     HEMOGLOBIN A1C WITH EAG; Future  -     TSH 3RD GENERATION; Future      Discussed plans, risk/benefits of treatments/observations. Through the use of shared decision making, above plans were agreed upon. Medication compliance advised. Patient verbalized understanding. Follow-up and Dispositions    · Return in about 4 months (around 10/13/2022) for DM2, HTN, HLD, meds labs. [place] MD  6/13/2022     Discussed with patient current guidelines for screening for lung cancer. Current recommendations are to obtain yearly screening LDCT yearly for age 46-80, or until smoke free for 15 years. Patient has 50 pack year history of cigarette smoking and currently smoking but is cutting down. Discussed with patient risks and benefits of screening, including over-diagnosis, false positive rate, and total radiation exposure. Patient currently exhibits no signs or symptoms suggestive of lung cancer. Discussed with patient importance of compliance with yearly annual lung cancer screenings and willingness to undergo diagnosis and treatment if screening scan is positive. In addition, patient was counseled regarding (remaining smoke free/total smoking cessation).

## 2022-06-13 NOTE — PROGRESS NOTES
Chief Complaint   Patient presents with    Diabetes    Hypertension    Cholesterol Problem    Rash     keeps coming back     Check meds    1. \"Have you been to the ER, urgent care clinic since your last visit? Hospitalized since your last visit? \"  No     2. \"Have you seen or consulted any other health care providers outside of the [address] [place] [address] since your last visit? \" no      3. For patients aged 39-70: Has the patient had a colonoscopy / FIT/ Cologuard?  Next due on 12/18/2028

## 2022-06-13 NOTE — LETTER
6/13/2022    Mr. [id number] [facility] [id number]      Dear Kassidy Singh.:    Please find your most recent results below. Resulted Orders   HGB & HCT   Result Value Ref Range    HGB 17.5 (H) 12.1 - 17.0 g/dL    HCT 55.0 (H) 36.6 - 50.3 %   HEPATIC FUNCTION PANEL   Result Value Ref Range    Protein, total 6.7 6.4 - 8.2 g/dL    Albumin 3.6 3.5 - 5.0 g/dL    Globulin 3.1 2.0 - 4.0 g/dL    A-G Ratio 1.2 1.1 - 2.2      Bilirubin, total 0.4 0.2 - 1.0 MG/DL    Bilirubin, direct 0.1 0.0 - 0.2 MG/DL    Alk.  phosphatase 76 45 - 117 U/L    AST (SGOT) 19 15 - 37 U/L    ALT (SGPT) 36 12 - 78 U/L   METABOLIC PANEL, BASIC   Result Value Ref Range    Sodium 138 136 - 145 mmol/L    Potassium 4.2 3.5 - 5.1 mmol/L    Chloride 104 97 - 108 mmol/L    CO2 28 21 - 32 mmol/L    Anion gap 6 5 - 15 mmol/L    Glucose 113 (H) 65 - 100 mg/dL    BUN 20 6 - 20 MG/DL    Creatinine 0.89 0.70 - 1.30 MG/DL    BUN/Creatinine ratio 22 (H) 12 - 20      GFR est AA >60 >60 ml/min/1.73m2    GFR est non-AA >60 >60 ml/min/1.73m2    Calcium 9.0 8.5 - 10.1 MG/DL   HEMOGLOBIN A1C WITH EAG   Result Value Ref Range    Hemoglobin A1c 6.1 (H) 4.0 - 5.6 %    Est. average glucose 128 mg/dL     Sincerely,    Vivien Lei MD

## 2022-06-30 DIAGNOSIS — T78.40XD ALLERGIC REACTION, SUBSEQUENT ENCOUNTER: Primary | ICD-10-CM

## 2022-08-16 DIAGNOSIS — G89.29 CHRONIC BILATERAL LOW BACK PAIN WITHOUT SCIATICA: ICD-10-CM

## 2022-08-16 DIAGNOSIS — M54.50 CHRONIC BILATERAL LOW BACK PAIN WITHOUT SCIATICA: ICD-10-CM

## 2022-08-16 NOTE — TELEPHONE ENCOUNTER
last visit:6/13/22  Next visit:10/13/22  Previous refill 6/13/22(30+2R)    Requested Prescriptions     Pending Prescriptions Disp Refills    naproxen (NAPROSYN) 500 mg tablet 30 Tablet 2     Sig: Take 1 Tablet by mouth two (2) times daily as needed for Pain. For [address] [facility] in place:   Recommendation Provided To:    Intervention Detail: New Rx: 1, reason: Patient Preference  Gap Closed?:   Intervention Accepted By:   Time Spent (min): 5

## 2022-08-17 RX ORDER — NAPROXEN 500 MG/1
500 TABLET ORAL
Qty: 30 TABLET | Refills: 2 | Status: SHIPPED | OUTPATIENT
Start: 2022-08-17

## 2022-08-25 ENCOUNTER — HOSPITAL ENCOUNTER (EMERGENCY)
Age: 62
Discharge: HOME | End: 2022-08-25
Attending: EMERGENCY MEDICINE
Payer: MEDICARE

## 2022-08-25 VITALS
DIASTOLIC BLOOD PRESSURE: 105 MMHG | RESPIRATION RATE: 18 BRPM | SYSTOLIC BLOOD PRESSURE: 142 MMHG | TEMPERATURE: 98 F | BODY MASS INDEX: 31.15 KG/M2 | WEIGHT: 230 LBS | HEIGHT: 72 IN | OXYGEN SATURATION: 94 % | HEART RATE: 89 BPM
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
## 2022-08-25 PROCEDURE — 99283 EMERGENCY DEPT VISIT LOW MDM: CPT

## 2022-08-25 RX ORDER — HYDROXYZINE 50 MG/1
50 TABLET, FILM COATED ORAL
Qty: 30 TABLET | Refills: 0 | Status: SHIPPED | OUTPATIENT
Start: 2022-08-25 | End: 2022-09-28 | Stop reason: SDUPTHER

## 2022-08-25 NOTE — ED PROVIDER NOTES
EMERGENCY DEPARTMENT HISTORY AND PHYSICAL EXAM      Date: 8/25/2022  Patient Name: Daniel Cano. History of Presenting Illness     Chief Complaint   Patient presents with    Rash     Ambulatory into triage, cc of rash over whole body x4 months, pt believes this is from chiggers. He has been to dermatologist, been on steroids with little relief. He was told he is allergic to chiggers. Has a biopsy on one of the rash papules, came back as \"non-venomous insect bite\". History Provided By: Patient    HPI: Daniel Cano., 60 y.o. male with significant PMHx as listed below, presents by POV to the ED with cc of a diffuse rash that has been on going for 3.5-4 months. He has been evaluated by his PCP and dermatologist on numerous occasions. He has taken 3-4 rounds of oral sterids and has tried several prescription steroid creams. He has also been taking an over the counter antihistamine without relief. Recently his dermatologist took a biopsy, which came back as a non-venomous insect bite. The patient notes that something happened years ago that was similar and after multiple attempts to make the rash go away with outpatient therapy he had to be admitted to the hospital for IV medications. He questions if this can happen at this time. There are no other complaints, changes, or physical findings at this time. Social Hx: Tobacco (former smoker), EtOH (denies), Illicit drug use (marijuana)     PCP: Emiliana Wallis MD    No current facility-administered medications on file prior to encounter. Current Outpatient Medications on File Prior to Encounter   Medication Sig Dispense Refill    naproxen (NAPROSYN) 500 mg tablet Take 1 Tablet by mouth two (2) times daily as needed for Pain. 30 Tablet 2    albuterol (PROVENTIL HFA, VENTOLIN HFA, PROAIR HFA) 90 mcg/actuation inhaler Take 1 Puff by inhalation every four (4) hours as needed for Wheezing.  18 g 1    atorvastatin (LIPITOR) 10 mg tablet TAKE 1 TABLET EVERY DAY 90 Tablet 1    amLODIPine (NORVASC) 5 mg tablet TAKE 1 TABLET EVERY DAY 90 Tablet 1    tamsulosin (FLOMAX) 0.4 mg capsule Take 1 Capsule by mouth daily. 90 Capsule 1    mometasone (ELOCON) 0.1 % ointment Apply  to affected area daily. Apply  to affected area daily. 45 g 0    predniSONE (DELTASONE) 10 mg tablet 3 tabs daily for 4 days, then 2 tabs daily for 4 days, then 1 tab daily for 4 days.  24 Tablet 0    alcohol swabs (BD Single Use Swabs Regular) padm TEST EVERY  Pad 1    lancets (TRUEplus Lancets) 33 gauge misc TEST EVERY  Each 1    True Metrix Glucose Test Strip strip TEST ONE TIME DAILY 100 Strip 1    metFORMIN (GLUCOPHAGE) 500 mg tablet TAKE 1/2 TABLET TWICE DAILY WITH MEALS (Patient not taking: Reported on 5/2/2022) 90 Tablet 1    lancets misc Check once daily 100 Each 1    glucose blood VI test strips (ASCENSIA AUTODISC VI, ONE TOUCH ULTRA TEST VI) strip Check once daily 90 Strip 1       Past History     Past Medical History:  Past Medical History:   Diagnosis Date    Borderline diabetes     Bradycardia 06/2019    per pt they changed BP med and all is resolved; Dr. Sylvia Torrez    Chronic pain     back    Colitis 12/3/2018    Degenerative disc disease, lumbar     Essential hypertension 12/3/2018    GERD (gastroesophageal reflux disease)     hiatal hernia    H/O hernia repair 12/11/2018    History of cholecystectomy 12/11/2018    Ill-defined condition     ruptured gall bladder    Ill-defined condition     hx of decreased O2 sats\" per pt he has been in the 90's and had to have a breathing tx prior to back injection\"     Osteoarthritis of spine with radiculopathy, lumbar region 12/11/2018    Smoker 12/11/2018       Past Surgical History:  Past Surgical History:   Procedure Laterality Date    COLONOSCOPY N/A 12/18/2018    COLONOSCOPY performed by Jay Estrada MD at [facility]  12/18/2018         HX CHOLECYSTECTOMY      HX COLONOSCOPY      HX HEENT 06/24/2019    oral surgery on back teeth    HX ORTHOPAEDIC      lt shoulder, muscle and skin graft    HX ORTHOPAEDIC      rt shoulder, anchors in place    HX ORTHOPAEDIC      rt foot, removed bone spur by great toe    HX ORTHOPAEDIC      rt hand, removed infected and cyst on wrist    HX OTHER SURGICAL      incisional hernia       Family History:  Family History   Problem Relation Age of Onset    Cancer Mother     Cancer Father        Social History:  Social History     Tobacco Use    Smoking status: Former     Packs/day: 1.00     Years: 50.00     Pack years: 50.00     Types: Cigarettes    Smokeless tobacco: Never    Tobacco comments:     quit 1 week ago   Vaping Use    Vaping Use: Never used   Substance Use Topics    Alcohol use: Yes     Comment: rarely    Drug use: Yes     Types: Marijuana     Comment: uses when \" hurts real bad\"        Allergies: Allergies   Allergen Reactions    Aspirin Other (comments)     Nose bleeds    Morphine Other (comments)     Agitation         Review of Systems   Review of Systems   Constitutional:  Negative for chills, diaphoresis and fever. HENT:  Negative for congestion, ear pain, rhinorrhea and sore throat. Respiratory:  Negative for cough and shortness of breath. Cardiovascular:  Negative for chest pain. Gastrointestinal:  Negative for abdominal pain, constipation, diarrhea, nausea and vomiting. Genitourinary:  Negative for difficulty urinating, dysuria, frequency and hematuria. Musculoskeletal:  Negative for arthralgias and myalgias. Skin:  Positive for rash. Neurological:  Negative for headaches. All other systems reviewed and are negative. Physical Exam   Physical Exam  Vitals and nursing note reviewed. Constitutional:       General: He is not in acute distress. Appearance: He is well-developed. He is not diaphoretic. Comments: 60 y.o.  male    HENT:      Head: Normocephalic and atraumatic.    Eyes:      General:         Right eye: No discharge. Left eye: No discharge. Conjunctiva/sclera: Conjunctivae normal.   Cardiovascular:      Rate and Rhythm: Normal rate and regular rhythm. Heart sounds: Normal heart sounds. No murmur heard. Pulmonary:      Effort: Pulmonary effort is normal. No respiratory distress. Breath sounds: Normal breath sounds. Musculoskeletal:      Cervical back: Normal range of motion and neck supple. Skin:     General: Skin is warm and dry. Findings: Rash present. Comments: Diffuse papular rash to the lower extremities, bilateral axilla, bilateral groin, and lower abdomen. Neurological:      Mental Status: He is alert and oriented to person, place, and time. Psychiatric:         Behavior: Behavior normal.       Diagnostic Study Results     Labs - none    Radiologic Studies - none    Medical Decision Making   I am the first provider for this patient. I reviewed the vital signs, available nursing notes, past medical history, past surgical history, family history and social history. Vital Signs-Reviewed the patient's vital signs. Patient Vitals for the past 12 hrs:   Temp Pulse Resp BP SpO2   08/25/22 1614 98 °F (36.7 °C) 89 18 (!) 142/105 94 %       Records Reviewed: Nursing Notes and Old Medical Records    Provider Notes (Medical Decision Making): The patient presents ED with stable vital signs for a rash has been present for several months. He is already had multiple evaluations and been on antihistamines and steroids with no improvement. Exam is consistent with the previous diagnosis of insect bites. Will try prescription antihistamines and I have encouraged the patient to follow-up with his dermatologist for further evaluation and treatment. He can always return to the ED if things get worse. ED Course:   Initial assessment performed. The patients presenting problems have been discussed, and they are in agreement with the care plan formulated and outlined with them.   I have encouraged them to ask questions as they arise throughout their visit. Critical Care Time: None    Disposition:  DISCHARGE NOTE:  4:44 PM  The pt is ready for discharge. The pt's signs, symptoms, diagnosis, and discharge instructions have been discussed and pt has conveyed their understanding. The pt is to follow up as recommended or return to ER should their symptoms worsen. Plan has been discussed and pt is in agreement. PLAN:  1. Current Discharge Medication List        START taking these medications    Details   hydrOXYzine HCL (ATARAX) 50 mg tablet Take 1 Tablet by mouth every six (6) hours as needed for Itching for up to 10 days. Qty: 30 Tablet, Refills: 0  Start date: 8/25/2022, End date: 9/4/2022           2. Follow-up Information       Follow up With Specialties Details Why Contact Info    Emiliana Wallis MD Family Medicine In 1 week As needed, If not improved [address].  [phone]      Your dematologist  In 1 month as scheduled     [facility] EMERGENCY DEPT Emergency Medicine  If symptoms worsen [address] [place], [place] [address] [facility]  [phone]          Return to ED if worse     Diagnosis     Clinical Impression:   1. Insect bites and stings, initial encounter          Please note that this dictation was completed with [employer], the computer voice recognition software. Quite often unanticipated grammatical, syntax, homophones, and other interpretive errors are inadvertently transcribed by the computer software. Please disregards these errors. Please excuse any errors that have escaped final proofreading.

## 2022-08-25 NOTE — DISCHARGE INSTRUCTIONS
It was a pleasure taking care of you at [facility] Emergency Department today. We know that when you come to [facility], you are entrusting us with your health, comfort, and safety. Our physicians and nurses honor that trust, and we truly appreciate the opportunity to care for you and your loved ones. We also value your feedback. If you receive a survey about your Emergency Department experience today, please fill it out. We care about our patients' feedback, and we listen to what you have to say. Thank you!

## 2022-08-31 DIAGNOSIS — T78.40XD ALLERGIC REACTION, SUBSEQUENT ENCOUNTER: ICD-10-CM

## 2022-09-01 DIAGNOSIS — E11.9 CONTROLLED TYPE 2 DIABETES MELLITUS WITHOUT COMPLICATION, WITHOUT LONG-TERM CURRENT USE OF INSULIN (HCC): ICD-10-CM

## 2022-09-01 RX ORDER — METFORMIN HYDROCHLORIDE 500 MG/1
TABLET ORAL
Qty: 90 TABLET | Refills: 1 | Status: SHIPPED | OUTPATIENT
Start: 2022-09-01 | End: 2022-09-28 | Stop reason: SDUPTHER

## 2022-09-02 DIAGNOSIS — T78.40XD ALLERGIC REACTION, SUBSEQUENT ENCOUNTER: Primary | ICD-10-CM

## 2022-09-02 RX ORDER — PREDNISONE 10 MG/1
TABLET ORAL
Qty: 24 TABLET | Refills: 0 | OUTPATIENT
Start: 2022-09-02

## 2022-09-02 RX ORDER — PREDNISONE 5 MG/1
TABLET ORAL
Qty: 21 TABLET | Refills: 0 | Status: SHIPPED | OUTPATIENT
Start: 2022-09-02 | End: 2022-09-28 | Stop reason: ALTCHOICE

## 2022-09-20 RX ORDER — LANCETS 33 GAUGE
EACH MISCELLANEOUS
Qty: 100 EACH | Refills: 1 | Status: SHIPPED | OUTPATIENT
Start: 2022-09-20

## 2022-09-20 RX ORDER — ISOPROPYL ALCOHOL 70 ML/100ML
SWAB TOPICAL
Qty: 100 PAD | Refills: 1 | Status: SHIPPED | OUTPATIENT
Start: 2022-09-20

## 2022-09-20 RX ORDER — CALCIUM CITRATE/VITAMIN D3 200MG-6.25
TABLET ORAL
Qty: 100 STRIP | Refills: 1 | Status: SHIPPED | OUTPATIENT
Start: 2022-09-20

## 2022-09-28 ENCOUNTER — OFFICE VISIT (OUTPATIENT)
Dept: FAMILY MEDICINE CLINIC | Age: 62
End: 2022-09-28
Payer: MEDICARE

## 2022-09-28 VITALS
WEIGHT: 229 LBS | BODY MASS INDEX: 31.02 KG/M2 | HEIGHT: 72 IN | RESPIRATION RATE: 16 BRPM | TEMPERATURE: 98 F | SYSTOLIC BLOOD PRESSURE: 129 MMHG | DIASTOLIC BLOOD PRESSURE: 89 MMHG | OXYGEN SATURATION: 94 % | HEART RATE: 71 BPM

## 2022-09-28 DIAGNOSIS — L29.8 CHRONIC PRURITIC RASH IN ADULT: Primary | ICD-10-CM

## 2022-09-28 DIAGNOSIS — E78.2 MIXED HYPERLIPIDEMIA: ICD-10-CM

## 2022-09-28 DIAGNOSIS — R39.11 BENIGN PROSTATIC HYPERPLASIA WITH URINARY HESITANCY: ICD-10-CM

## 2022-09-28 DIAGNOSIS — N40.1 BENIGN PROSTATIC HYPERPLASIA WITH URINARY HESITANCY: ICD-10-CM

## 2022-09-28 DIAGNOSIS — I10 ESSENTIAL HYPERTENSION: ICD-10-CM

## 2022-09-28 DIAGNOSIS — Z79.899 ENCOUNTER FOR LONG-TERM (CURRENT) USE OF MEDICATIONS: ICD-10-CM

## 2022-09-28 DIAGNOSIS — E11.9 CONTROLLED TYPE 2 DIABETES MELLITUS WITHOUT COMPLICATION, WITHOUT LONG-TERM CURRENT USE OF INSULIN (HCC): ICD-10-CM

## 2022-09-28 PROCEDURE — G8754 DIAS BP LESS 90: HCPCS | Performed by: FAMILY MEDICINE

## 2022-09-28 PROCEDURE — 3044F HG A1C LEVEL LT 7.0%: CPT | Performed by: FAMILY MEDICINE

## 2022-09-28 PROCEDURE — G8417 CALC BMI ABV UP PARAM F/U: HCPCS | Performed by: FAMILY MEDICINE

## 2022-09-28 PROCEDURE — 3017F COLORECTAL CA SCREEN DOC REV: CPT | Performed by: FAMILY MEDICINE

## 2022-09-28 PROCEDURE — G8510 SCR DEP NEG, NO PLAN REQD: HCPCS | Performed by: FAMILY MEDICINE

## 2022-09-28 PROCEDURE — G8752 SYS BP LESS 140: HCPCS | Performed by: FAMILY MEDICINE

## 2022-09-28 PROCEDURE — 2022F DILAT RTA XM EVC RTNOPTHY: CPT | Performed by: FAMILY MEDICINE

## 2022-09-28 PROCEDURE — G8427 DOCREV CUR MEDS BY ELIG CLIN: HCPCS | Performed by: FAMILY MEDICINE

## 2022-09-28 PROCEDURE — 99214 OFFICE O/P EST MOD 30 MIN: CPT | Performed by: FAMILY MEDICINE

## 2022-09-28 RX ORDER — METFORMIN HYDROCHLORIDE 500 MG/1
500 TABLET ORAL 2 TIMES DAILY WITH MEALS
Qty: 180 TABLET | Refills: 1 | Status: SHIPPED | OUTPATIENT
Start: 2022-09-28

## 2022-09-28 RX ORDER — AMLODIPINE BESYLATE 5 MG/1
TABLET ORAL
Qty: 90 TABLET | Refills: 1 | Status: SHIPPED | OUTPATIENT
Start: 2022-09-28

## 2022-09-28 RX ORDER — ATORVASTATIN CALCIUM 10 MG/1
TABLET, FILM COATED ORAL
Qty: 90 TABLET | Refills: 1 | Status: SHIPPED | OUTPATIENT
Start: 2022-09-28

## 2022-09-28 RX ORDER — HYDROXYZINE 50 MG/1
50 TABLET, FILM COATED ORAL
Qty: 30 TABLET | Refills: 0 | Status: SHIPPED | OUTPATIENT
Start: 2022-09-28 | End: 2022-10-08

## 2022-09-28 RX ORDER — BETAMETHASONE VALERATE 1.2 MG/G
OINTMENT TOPICAL 2 TIMES DAILY
Qty: 15 G | Refills: 0 | Status: SHIPPED | OUTPATIENT
Start: 2022-09-28

## 2022-09-28 RX ORDER — TERAZOSIN 5 MG/1
5 CAPSULE ORAL
Qty: 90 CAPSULE | Refills: 1 | Status: SHIPPED | OUTPATIENT
Start: 2022-09-28 | End: 2022-10-17 | Stop reason: SDUPTHER

## 2022-09-28 NOTE — PROGRESS NOTES
Neto Quiroz. is a 60 y.o. male    Disability due to back and shoulder  He forgot to do labs    Vaccinated for Brandi       has a past medical history of Borderline diabetes, Bradycardia (06/2019), Chronic pain, Colitis (12/3/2018), Degenerative disc disease, lumbar, Essential hypertension (12/3/2018), GERD (gastroesophageal reflux disease), H/O hernia repair (12/11/2018), History of cholecystectomy (12/11/2018), Ill-defined condition, Ill-defined condition, Osteoarthritis of spine with radiculopathy, lumbar region (12/11/2018), and Smoker (12/11/2018). Chronic rash, have seen derm for 2 months now. Only saw NP, have had 2 biopsy  Still having rash  We'll do atarax, steroid ointment  Refer to new derm    DM2: a1C 6.1% 6/2022, 5.7% 1/2022, 5.9% 09/2021, 6.7% 4/2021, 6.5% 1/2021, 6.1% 06/2020, 6.1% 12/2019, 6.2% 05/2019  Discussed pathophysiology and risk, complication. Dieting   Discussed blood glucose monitoring  metformin 1/2 BID or hold it prn. Continue course     HTN: BP at goal  Continue amlodipine     HLD: lipitor 10mg      BPH: PSA normal - urinary hessitency and dribling. Flomax - not working  Try hytring    Smoker. 1ppd since 11yoa, 50+ packs years. Discuss cessation. He started wellbutrin 2 weeks ago and is now down to 4 cigs per day. CT lungs low dose; Small pulmonary nodules measuring up to 4 mm as detailed above. Lung-RADS Category: 2, benign appearance or behavior  Management recommendation: Follow-up low-dose screening CT in one year. Tried wellbutrin but he says makes him feels depressed when taking it, so haven't been. Still smoking 5cigs daily  Due 01/28/2022      Reviewed: active problem list, medication list, allergies, notes from last encounter, lab results    A comprehensive review of systems was negative except for that written in the HPI.       Allergies   Allergen Reactions    Aspirin Other (comments)     Nose bleeds    Morphine Other (comments)     Agitation     Current Outpatient Medications on File Prior to Visit   Medication Sig Dispense Refill    True Metrix Glucose Test Strip strip TEST ONE TIME DAILY 100 Strip 1    lancets (TRUEplus Lancets) 33 gauge misc TEST EVERY  Each 1    DropSafe Alcohol Prep Pads padm TEST EVERY  Pad 1    naproxen (NAPROSYN) 500 mg tablet Take 1 Tablet by mouth two (2) times daily as needed for Pain. 30 Tablet 2    albuterol (PROVENTIL HFA, VENTOLIN HFA, PROAIR HFA) 90 mcg/actuation inhaler Take 1 Puff by inhalation every four (4) hours as needed for Wheezing. 18 g 1    lancets misc Check once daily 100 Each 1    glucose blood VI test strips (ASCENSIA AUTODISC VI, ONE TOUCH ULTRA TEST VI) strip Check once daily 90 Strip 1    mometasone (ELOCON) 0.1 % ointment Apply  to affected area daily. Apply  to affected area daily. 45 g 0     No current facility-administered medications on file prior to visit. Patient Active Problem List   Diagnosis Code    Colitis K52.9    Essential hypertension I10    Osteoarthritis of spine with radiculopathy, lumbar region M47.26    Smoker F17.200    History of cholecystectomy Z90.49    H/O hernia repair Z98.890, Z87.19    Type 2 diabetes mellitus E11.9    Opioid use, unspecified with unspecified opioid-induced disorder F11.99       Visit Vitals  /89 (BP 1 Location: Left upper arm, BP Patient Position: Sitting, BP Cuff Size: Adult long)   Pulse 71   Temp 98 °F (36.7 °C) (Temporal)   Resp 16   Ht 6' (1.829 m)   Wt 229 lb (103.9 kg)   SpO2 94%   BMI 31.06 kg/m²     General appearance: alert, cooperative, no distress, appears stated age  Neurologic: Alert and oriented X 3, normal strength and tone, symmetric. Normal without focal findings. Cranial nerves 2-12 intact. Normal coordination and gait. Mental status: Alert, oriented, thought content appropriate, affect: stable, mood-congruent. Head: Normocephalic, without obvious abnormality, atraumatic  Eyes: conjunctivae/corneas clear.  PERRL, EOM's intact. Neck: supple, symmetrical, trachea midline, no JVD  Lungs: clear to auscultation bilaterally  Heart: regular rate and rhythm, S1, S2 normal, no murmur, click, rub or gallop  Abdomen: soft, non-tender. Extremities: extremities normal, atraumatic, no cyanosis or edema    Media Information  Document Information    Photographic Image:  Mobile Media Capture      09/28/2022 11:19   Attached To: Office Visit on 9/28/22 with Maximilian Bruce MD     Source Information    Maximilian Bruce MD  [phone]       Assessment/Plans:    Diagnoses and all orders for this visit:    1. Chronic pruritic rash in adult  -     REFERRAL TO DERMATOLOGY  -     hydrOXYzine HCL (ATARAX) 50 mg tablet; Take 1 Tablet by mouth three (3) times daily as needed for Itching for up to 10 days. -     betamethasone valerate (VALISONE) 0.1 % ointment; Apply  to affected area two (2) times a day. 2. Controlled type 2 diabetes mellitus without complication, without long-term current use of insulin (HCC)  -     metFORMIN (GLUCOPHAGE) 500 mg tablet; Take 1 Tablet by mouth two (2) times daily (with meals). -     METABOLIC PANEL, COMPREHENSIVE; Future  -     CBC W/O DIFF; Future  -     HEMOGLOBIN A1C WITH EAG; Future  -     MICROALBUMIN, UR, RAND W/ MICROALB/CREAT RATIO; Future  -     TSH 3RD GENERATION; Future    3. Essential hypertension  -     amLODIPine (NORVASC) 5 mg tablet; TAKE 1 TABLET EVERY DAY  -     METABOLIC PANEL, COMPREHENSIVE; Future  -     CBC W/O DIFF; Future  -     TSH 3RD GENERATION; Future    4. Mixed hyperlipidemia  -     atorvastatin (LIPITOR) 10 mg tablet; TAKE 1 TABLET EVERY DAY  -     METABOLIC PANEL, COMPREHENSIVE; Future  -     CBC W/O DIFF; Future  -     TSH 3RD GENERATION; Future    5. Benign prostatic hyperplasia with urinary hesitancy  -     terazosin (HYTRIN) 5 mg capsule; Take 1 Capsule by mouth nightly.     6. Encounter for long-term (current) use of medications  -     METABOLIC PANEL, COMPREHENSIVE; Future  -     CBC W/O DIFF; Future  -     HEMOGLOBIN A1C WITH EAG; Future  -     MICROALBUMIN, UR, RAND W/ MICROALB/CREAT RATIO; Future  -     TSH 3RD GENERATION; Future      Discussed plans, risk/benefits of treatments/observations. Through the use of shared decision making, above plans were agreed upon. Medication compliance advised. Patient verbalized understanding. Follow-up and Dispositions    Return in about 8 weeks (around 11/21/2022) for HTN, DM2, HLD, BPH, meds, labs. Shanika Morales MD  9/28/2022     Discussed with patient current guidelines for screening for lung cancer. Current recommendations are to obtain yearly screening LDCT yearly for age 46-80, or until smoke free for 15 years. Patient has 50 pack year history of cigarette smoking and currently smoking but is cutting down. Discussed with patient risks and benefits of screening, including over-diagnosis, false positive rate, and total radiation exposure. Patient currently exhibits no signs or symptoms suggestive of lung cancer. Discussed with patient importance of compliance with yearly annual lung cancer screenings and willingness to undergo diagnosis and treatment if screening scan is positive. In addition, patient was counseled regarding (remaining smoke free/total smoking cessation).

## 2022-09-28 NOTE — PROGRESS NOTES
Ethan Lu. is a 60 y.o. male  Chief Complaint   Patient presents with    Follow-up     Chronic rash generalized and ithching         1. Have you been to the ER,  yes urgent care clinic since your last visit? no Hospitalized since your last visit?no    2. Have you seen or consulted any other health care providers outside of the [address] [place] [address] since your last visit? yesInclude any pap smears or colon screening.  no

## 2022-10-17 DIAGNOSIS — N40.1 BENIGN PROSTATIC HYPERPLASIA WITH URINARY HESITANCY: ICD-10-CM

## 2022-10-17 DIAGNOSIS — R39.11 BENIGN PROSTATIC HYPERPLASIA WITH URINARY HESITANCY: ICD-10-CM

## 2022-10-17 RX ORDER — TERAZOSIN 5 MG/1
5 CAPSULE ORAL
Qty: 90 CAPSULE | Refills: 1 | Status: SHIPPED | OUTPATIENT
Start: 2022-10-17

## 2022-11-29 ENCOUNTER — OFFICE VISIT (OUTPATIENT)
Dept: FAMILY MEDICINE CLINIC | Age: 62
End: 2022-11-29
Payer: MEDICARE

## 2022-11-29 VITALS
OXYGEN SATURATION: 95 % | HEART RATE: 62 BPM | BODY MASS INDEX: 30.56 KG/M2 | WEIGHT: 225.6 LBS | RESPIRATION RATE: 18 BRPM | TEMPERATURE: 97.5 F | HEIGHT: 72 IN | DIASTOLIC BLOOD PRESSURE: 70 MMHG | SYSTOLIC BLOOD PRESSURE: 112 MMHG

## 2022-11-29 DIAGNOSIS — R39.11 BENIGN PROSTATIC HYPERPLASIA WITH URINARY HESITANCY: ICD-10-CM

## 2022-11-29 DIAGNOSIS — E78.2 MIXED HYPERLIPIDEMIA: ICD-10-CM

## 2022-11-29 DIAGNOSIS — Z00.00 MEDICARE ANNUAL WELLNESS VISIT, SUBSEQUENT: Primary | ICD-10-CM

## 2022-11-29 DIAGNOSIS — I10 ESSENTIAL HYPERTENSION: ICD-10-CM

## 2022-11-29 DIAGNOSIS — E11.9 CONTROLLED TYPE 2 DIABETES MELLITUS WITHOUT COMPLICATION, WITHOUT LONG-TERM CURRENT USE OF INSULIN (HCC): ICD-10-CM

## 2022-11-29 DIAGNOSIS — Z79.899 ENCOUNTER FOR LONG-TERM (CURRENT) USE OF MEDICATIONS: ICD-10-CM

## 2022-11-29 DIAGNOSIS — F17.218 NICOTINE DEPENDENCE, CIGARETTES, WITH OTHER NICOTINE-INDUCED DISORDERS: ICD-10-CM

## 2022-11-29 DIAGNOSIS — N40.1 BENIGN PROSTATIC HYPERPLASIA WITH URINARY HESITANCY: ICD-10-CM

## 2022-11-29 DIAGNOSIS — Z23 NEEDS FLU SHOT: ICD-10-CM

## 2022-11-29 PROCEDURE — G8417 CALC BMI ABV UP PARAM F/U: HCPCS | Performed by: FAMILY MEDICINE

## 2022-11-29 PROCEDURE — 3078F DIAST BP <80 MM HG: CPT | Performed by: FAMILY MEDICINE

## 2022-11-29 PROCEDURE — 2022F DILAT RTA XM EVC RTNOPTHY: CPT | Performed by: FAMILY MEDICINE

## 2022-11-29 PROCEDURE — G8427 DOCREV CUR MEDS BY ELIG CLIN: HCPCS | Performed by: FAMILY MEDICINE

## 2022-11-29 PROCEDURE — G8510 SCR DEP NEG, NO PLAN REQD: HCPCS | Performed by: FAMILY MEDICINE

## 2022-11-29 PROCEDURE — 90686 IIV4 VACC NO PRSV 0.5 ML IM: CPT | Performed by: FAMILY MEDICINE

## 2022-11-29 PROCEDURE — 3017F COLORECTAL CA SCREEN DOC REV: CPT | Performed by: FAMILY MEDICINE

## 2022-11-29 PROCEDURE — 99213 OFFICE O/P EST LOW 20 MIN: CPT | Performed by: FAMILY MEDICINE

## 2022-11-29 PROCEDURE — G0439 PPPS, SUBSEQ VISIT: HCPCS | Performed by: FAMILY MEDICINE

## 2022-11-29 PROCEDURE — 3074F SYST BP LT 130 MM HG: CPT | Performed by: FAMILY MEDICINE

## 2022-11-29 PROCEDURE — 3044F HG A1C LEVEL LT 7.0%: CPT | Performed by: FAMILY MEDICINE

## 2022-11-29 PROCEDURE — G0008 ADMIN INFLUENZA VIRUS VAC: HCPCS | Performed by: FAMILY MEDICINE

## 2022-11-29 PROCEDURE — G8754 DIAS BP LESS 90: HCPCS | Performed by: FAMILY MEDICINE

## 2022-11-29 PROCEDURE — G8752 SYS BP LESS 140: HCPCS | Performed by: FAMILY MEDICINE

## 2022-11-29 RX ORDER — DUPILUMAB 200 MG/1.14ML
INJECTION, SOLUTION SUBCUTANEOUS
COMMUNITY

## 2022-11-29 RX ORDER — TERAZOSIN 5 MG/1
5 CAPSULE ORAL
Qty: 90 CAPSULE | Refills: 1 | Status: SHIPPED | OUTPATIENT
Start: 2022-11-29

## 2022-11-29 RX ORDER — KETOCONAZOLE 20 MG/G
CREAM TOPICAL
COMMUNITY
Start: 2022-11-09

## 2022-11-29 RX ORDER — AMLODIPINE BESYLATE 5 MG/1
TABLET ORAL
Qty: 90 TABLET | Refills: 1 | Status: SHIPPED | OUTPATIENT
Start: 2022-11-29

## 2022-11-29 RX ORDER — HYDROXYZINE 25 MG/1
25 TABLET, FILM COATED ORAL
COMMUNITY
Start: 2022-10-14

## 2022-11-29 RX ORDER — ATORVASTATIN CALCIUM 10 MG/1
TABLET, FILM COATED ORAL
Qty: 90 TABLET | Refills: 1 | Status: SHIPPED | OUTPATIENT
Start: 2022-11-29

## 2022-11-29 NOTE — PROGRESS NOTES
Chief Complaint   Patient presents with    Annual Wellness Visit    Hypertension    Diabetes    Cholesterol Problem     Check meds    1. \"Have you been to the ER, urgent care clinic since your last visit? Hospitalized since your last visit? \" No    2. \"Have you seen or consulted any other health care providers outside of the [address] [place] [address] since your last visit? \" Yes Dermatologist      3. For patients aged 39-70: Has the patient had a colonoscopy / FIT/ Cologuard? Yes - no Care Gap present      If the patient is female:    4. For patients aged 41-77: Has the patient had a mammogram within the past 2 years? NA - based on age or sex      11. For patients aged 21-65: Has the patient had a pap smear? NA - based on age or sex      Order placed for Flulaval per Verbal Order from Dr. Helen Pond on 11/29/2022 due to need for immunization. Shanti Rae is a 60 y.o. male who presents for routine immunizations. He denies any symptoms , reactions or allergies that would exclude them from being immunized today. Risks and adverse reactions were discussed and the VIS was given to them. All questions were addressed. He was observed for 15 min post injection. There were no reactions observed.     Mina Nieves LPN

## 2022-11-29 NOTE — PROGRESS NOTES
This is a [facility]oz-[facility]pa Exam (AWV)     I have reviewed the patient's medical history in detail and updated the computerized patient record. Niko Patricia. is a 60 y.o. male     has a past medical history of Borderline diabetes, Bradycardia (06/2019), Chronic pain, Colitis (12/3/2018), Degenerative disc disease, lumbar, Essential hypertension (12/3/2018), GERD (gastroesophageal reflux disease), H/O hernia repair (12/11/2018), History of cholecystectomy (12/11/2018), Ill-defined condition, Ill-defined condition, Osteoarthritis of spine with radiculopathy, lumbar region (12/11/2018), and Smoker (12/11/2018). DM2: a1C 5.9%6.1% 6/2022, 5.7% 1/2022, 5.9% 09/2021, 6.7% 4/2021, 6.5% 1/2021, 6.1% 06/2020, 6.1% 12/2019, 6.2% 05/2019  Discussed pathophysiology and risk, complication. Dieting   Discussed blood glucose monitoring  Discussed metformin 1/2 BID or hold it prn. but he's been taking it BID. He will now try to cut back or stop it  Continue course     HTN: BP at goal  Continue amlodipine     HLD: lipitor 10mg      BPH: PSA normal - urinary hessitency and dribling. Flomax - not working  Try hytrin helping, waking up just once at night to bathroom     Smoker. 1ppd since 11yoa, 50+ packs years. Discuss cessation. He started wellbutrin 2 weeks ago and is now down to 4 cigs per day. CT lungs low dose; Small pulmonary nodules measuring up to 4 mm as detailed above. Lung-RADS Category: 2, benign appearance or behavior  Management recommendation: Follow-up low-dose screening CT in one year. Tried wellbutrin but he says makes him feels depressed when taking it, so haven't been.   Still smoking 5cigs daily     Psoriasis managed by Betdejan Babin inj      History     Past Medical History:   Diagnosis Date    Borderline diabetes     Bradycardia 06/2019    per pt they changed BP med and all is resolved; Dr. Quick Spotted    Chronic pain     back    Colitis 12/3/2018    Degenerative disc disease, lumbar Essential hypertension 12/3/2018    GERD (gastroesophageal reflux disease)     hiatal hernia    H/O hernia repair 12/11/2018    History of cholecystectomy 12/11/2018    Ill-defined condition     ruptured gall bladder    Ill-defined condition     hx of decreased O2 sats\" per pt he has been in the 90's and had to have a breathing tx prior to back injection\"     Osteoarthritis of spine with radiculopathy, lumbar region 12/11/2018    Smoker 12/11/2018      Past Surgical History:   Procedure Laterality Date    COLONOSCOPY N/A 12/18/2018    COLONOSCOPY performed by Sundeep Lyn MD at [facility]  12/18/2018         HX CHOLECYSTECTOMY      HX COLONOSCOPY      HX HEENT  06/24/2019    oral surgery on back teeth    HX ORTHOPAEDIC      lt shoulder, muscle and skin graft    HX ORTHOPAEDIC      rt shoulder, anchors in place    HX ORTHOPAEDIC      rt foot, removed bone spur by great toe    HX ORTHOPAEDIC      rt hand, removed infected and cyst on wrist    HX OTHER SURGICAL      incisional hernia     Current Outpatient Medications   Medication Sig Dispense Refill    ketoconazole (NIZORAL) 2 % topical cream apply to affected area ON face twice a day for 2 weeks      dupilumab (Dupixent Pen) 200 mg/1.14 mL pnij by SubCUTAneous route. terazosin (HYTRIN) 5 mg capsule Take 1 Capsule by mouth nightly. 90 Capsule 1    atorvastatin (LIPITOR) 10 mg tablet TAKE 1 TABLET EVERY DAY 90 Tablet 1    amLODIPine (NORVASC) 5 mg tablet TAKE 1 TABLET EVERY DAY 90 Tablet 1    metFORMIN (GLUCOPHAGE) 500 mg tablet Take 1 Tablet by mouth two (2) times daily (with meals).  180 Tablet 1    True Metrix Glucose Test Strip strip TEST ONE TIME DAILY 100 Strip 1    lancets (TRUEplus Lancets) 33 gauge misc TEST EVERY  Each 1    DropSafe Alcohol Prep Pads padm TEST EVERY  Pad 1    lancets misc Check once daily 100 Each 1    glucose blood VI test strips (ASCENSIA AUTODISC VI, ONE TOUCH ULTRA TEST VI) strip Check once daily 90 Strip 1    hydrOXYzine HCL (ATARAX) 25 mg tablet Take 25 mg by mouth nightly. (Patient not taking: Reported on 11/29/2022)      albuterol (PROVENTIL HFA, VENTOLIN HFA, PROAIR HFA) 90 mcg/actuation inhaler Take 1 Puff by inhalation every four (4) hours as needed for Wheezing.  (Patient not taking: Reported on 11/29/2022) 18 g 1     Allergies   Allergen Reactions    Aspirin Other (comments)     Nose bleeds    Morphine Other (comments)     Agitation     Family History   Problem Relation Age of Onset    Cancer Mother     Cancer Father      Social History     Tobacco Use    Smoking status: Every Day     Packs/day: 0.50     Years: 50.00     Pack years: 25.00     Types: Cigarettes    Smokeless tobacco: Never   Substance Use Topics    Alcohol use: Yes     Comment: rarely     Patient Active Problem List   Diagnosis Code    Colitis K52.9    Essential hypertension I10    Osteoarthritis of spine with radiculopathy, lumbar region M47.26    Smoker F17.200    History of cholecystectomy Z90.49    H/O hernia repair Z98.890, Z87.19    Type 2 diabetes mellitus E11.9    Opioid use, unspecified with unspecified opioid-induced disorder F11.99         Depression Risk Factor Screening:     3 most recent PHQ Screens 11/29/2022   Little interest or pleasure in doing things Not at all   Feeling down, depressed, irritable, or hopeless Not at all   Total Score PHQ 2 0   Trouble falling or staying asleep, or sleeping too much -   Feeling tired or having little energy -   Poor appetite, weight loss, or overeating -   Feeling bad about yourself - or that you are a failure or have let yourself or your family down -   Trouble concentrating on things such as school, work, reading, or watching TV -   Moving or speaking so slowly that other people could have noticed; or the opposite being so fidgety that others notice -   Thoughts of being better off dead, or hurting yourself in some way -   PHQ 9 Score -   How difficult have these problems made it for you to do your work, take care of your home and get along with others -       Alcohol Risk Factor Screening: You do not drink alcohol or very rarely. Functional Ability and Level of Safety:     Hearing Loss   Hearing is good. Activities of Daily Living   Self-care. Requires assistance with: no ADLs    Fall Risk   No flowsheet data found. Abuse Screen   Patient is not abused    Review of Systems   A comprehensive review of systems was negative except for that written in the HPI. Physical Examination     Evaluation of Cognitive Function:  Mood/affect:  happy  Appearance: age appropriate and well dressed  Family member/caregiver input: none    Visit Vitals  /70   Pulse 62   Temp 97.5 °F (36.4 °C) (Temporal)   Resp 18   Ht 6' (1.829 m)   Wt 225 lb 9.6 oz (102.3 kg)   SpO2 95%   BMI 30.60 kg/m²     General appearance: alert, cooperative, no distress, appears stated age  Neurologic: Alert and oriented X 3, normal strength and tone, symmetric. Normal without focal findings. Cranial nerves 2-12 intact. Normal coordination and gait. Mental status: Alert, oriented, thought content appropriate, affect: stable, mood-congruent. Head: Normocephalic, without obvious abnormality, atraumatic  Eyes: conjunctivae/corneas clear. PERRL, EOM's intact. Neck: supple, symmetrical, trachea midline, no JVD  Lungs: clear to auscultation bilaterally  Heart: regular rate and rhythm, S1, S2 normal, no murmur, click, rub or gallop  Abdomen: soft, non-tender.    Extremities: extremities normal, atraumatic, no cyanosis or edema    Patient Care Team:  Humble Cho MD as PCP - General (Family Medicine)  Humble Cho MD as PCP - [facility] Empaneled Provider  Niranjan Wyatt MD (Cardiovascular Disease Physician)      Advice/Referrals/Counseling   Education and counseling provided:  Are appropriate based on today's review and evaluation  End-of-Life planning (with patient's consent)  Pneumococcal Vaccine  Influenza Vaccine  Prostate cancer screening tests (PSA, covered annually)  Colorectal cancer screening tests  Diabetes outpatient self-management training services      Assessment/Plan     Diagnoses and all orders for this visit:    1. Medicare annual wellness visit, subsequent    2. Controlled type 2 diabetes mellitus without complication, without long-term current use of insulin (HCC)  -     METABOLIC PANEL, COMPREHENSIVE; Future  -     HEMOGLOBIN A1C WITH EAG; Future  -     TSH 3RD GENERATION; Future  -     LIPID PANEL; Future    3. Essential hypertension  -     amLODIPine (NORVASC) 5 mg tablet; TAKE 1 TABLET EVERY DAY  -     METABOLIC PANEL, COMPREHENSIVE; Future  -     TSH 3RD GENERATION; Future    4. Mixed hyperlipidemia  -     atorvastatin (LIPITOR) 10 mg tablet; TAKE 1 TABLET EVERY DAY  -     METABOLIC PANEL, COMPREHENSIVE; Future  -     TSH 3RD GENERATION; Future  -     LIPID PANEL; Future    5. Benign prostatic hyperplasia with urinary hesitancy  -     terazosin (HYTRIN) 5 mg capsule; Take 1 Capsule by mouth nightly. -     PSA W/ REFLX FREE PSA; Future    6. Nicotine dependence, cigarettes, with other nicotine-induced disorders    7. Encounter for long-term (current) use of medications  -     METABOLIC PANEL, COMPREHENSIVE; Future  -     HEMOGLOBIN A1C WITH EAG; Future  -     TSH 3RD GENERATION; Future  -     LIPID PANEL; Future  -     PSA W/ REFLX FREE PSA; Future    8. Needs flu shot  -     INFLUENZA, FLUARIX, FLULAVAL, FLUZONE (AGE 6 MO+), AFLURIA(AGE 3Y+) IM, PF, 0.5 ML  . Follow-up and Dispositions    Return in about 4 months (around 3/29/2023) for DM2, HTN, HLD, meds, labs. Leyda Beltran MD  11/29/2022.

## 2022-11-29 NOTE — PATIENT INSTRUCTIONS
Vaccine Information Statement    Influenza (Flu) Vaccine (Inactivated or Recombinant): What You Need to Know    Many vaccine information statements are available in [place] and other languages. See www.immunize.org/vis. Hojas de información sobre vacunas están disponibles en español y en muchos otros idiomas. Visite www.immunize.org/vis. 1. Why get vaccinated? Influenza vaccine can prevent influenza (flu). Flu is a contagious disease that spreads around the [facility] every year, usually between October and May. Anyone can get the flu, but it is more dangerous for some people. Infants and young children, people 72 years and older, pregnant people, and people with certain health conditions or a weakened immune system are at greatest risk of flu complications. Pneumonia, bronchitis, sinus infections, and ear infections are examples of flu-related complications. If you have a medical condition, such as heart disease, cancer, or diabetes, flu can make it worse. Flu can cause fever and chills, sore throat, muscle aches, fatigue, cough, headache, and runny or stuffy nose. Some people may have vomiting and diarrhea, though this is more common in children than adults. In an average year, thousands of people in the [facility] die from flu, and many more are hospitalized. Flu vaccine prevents millions of illnesses and flu-related visits to the doctor each year. 2. Influenza vaccines     CDC recommends everyone 6 months and older get vaccinated every flu season. Children 6 months through 6years of age may need 2 doses during a single flu season. Everyone else needs only 1 dose each flu season. It takes about 2 weeks for protection to develop after vaccination. There are many flu viruses, and they are always changing. Each year a new flu vaccine is made to protect against the influenza viruses believed to be likely to cause disease in the upcoming flu season.  Even when the vaccine doesnt exactly match these viruses, it may still provide some protection. Influenza vaccine does not cause flu. Influenza vaccine may be given at the same time as other vaccines. 3. Talk with your health care provider    Tell your vaccination provider if the person getting the vaccine:  Has had an allergic reaction after a previous dose of influenza vaccine, or has any severe, life-threatening allergies   Has ever had Guillain-Barré Syndrome (also called GBS)    In some cases, your health care provider may decide to postpone influenza vaccination until a future visit. Influenza vaccine can be administered at any time during pregnancy. People who are or will be pregnant during influenza season should receive inactivated influenza vaccine. People with minor illnesses, such as a cold, may be vaccinated. People who are moderately or severely ill should usually wait until they recover before getting influenza vaccine. Your health care provider can give you more information. 4. Risks of a vaccine reaction    Soreness, redness, and swelling where the shot is given, fever, muscle aches, and headache can happen after influenza vaccination. There may be a very small increased risk of Guillain-Barré Syndrome (GBS) after inactivated influenza vaccine (the flu shot). [facility] children who get the flu shot along with pneumococcal vaccine (PCV13) and/or DTaP vaccine at the same time might be slightly more likely to have a seizure caused by fever. Tell your health care provider if a child who is getting flu vaccine has ever had a seizure. People sometimes faint after medical procedures, including vaccination. Tell your provider if you feel dizzy or have vision changes or ringing in the ears. As with any medicine, there is a very remote chance of a vaccine causing a severe allergic reaction, other serious injury, or death. 5. What if there is a serious problem?     An allergic reaction could occur after the vaccinated person leaves the clinic. If you see signs of a severe allergic reaction (hives, swelling of the face and throat, difficulty breathing, a fast heartbeat, dizziness, or weakness), call 9-1-1 and get the person to the nearest hospital.    For other signs that concern you, call your health care provider. Adverse reactions should be reported to the Vaccine Adverse Event Reporting System (VAERS). Your health care provider will usually file this report, or you can do it yourself. Visit the VAERS website at www.vaers. [facility].gov or call 7-261.603.5849. VAERS is only for reporting reactions, and VAERS staff members do not give medical advice. 6. The National Vaccine Injury Compensation Program    The [facility] Vaccine Injury Compensation Program (VICP) is a federal program that was created to compensate people who may have been injured by certain vaccines. Claims regarding alleged injury or death due to vaccination have a time limit for filing, which may be as short as two years. Visit the VICP website at www.[facility].gov/vaccinecompensation or call 4-553.370.9297 to learn about the program and about filing a claim. 7. How can I learn more? Ask your health care provider. Call your local or state health department. Visit the website of the Food and Drug Administration (FDA) for vaccine package inserts and additional information at www.fda.gov/vaccines-blood-biologics/vaccines. Contact the Centers for Disease Control and Prevention (CDC): Call 6-678.694.1165 (6-068-YOL-INFO) or  Visit CDCs influenza website at www.cdc.gov/flu. Vaccine Information Statement   Inactivated Influenza Vaccine   8/6/2021  42 NAEL Wise [id number]-14   Department of Health and Human Services  Centers for Disease Control and Prevention    Office Use Only

## 2023-01-11 DIAGNOSIS — F17.210 CIGARETTE SMOKER: ICD-10-CM

## 2023-01-11 DIAGNOSIS — F17.200 CURRENT EVERY DAY SMOKER: Primary | ICD-10-CM

## 2023-02-06 ENCOUNTER — HOSPITAL ENCOUNTER (OUTPATIENT)
Dept: CT IMAGING | Age: 63
Discharge: HOME | End: 2023-02-06
Attending: FAMILY MEDICINE
Payer: MEDICARE

## 2023-02-06 DIAGNOSIS — F17.210 CIGARETTE SMOKER: ICD-10-CM

## 2023-02-06 DIAGNOSIS — F17.200 CURRENT EVERY DAY SMOKER: ICD-10-CM

## 2023-02-06 PROCEDURE — 71271 CT THORAX LUNG CANCER SCR C-: CPT

## 2023-02-20 ENCOUNTER — APPOINTMENT (OUTPATIENT)
Dept: CT IMAGING | Age: 63
End: 2023-02-20
Attending: STUDENT IN AN ORGANIZED HEALTH CARE EDUCATION/TRAINING PROGRAM
Payer: MEDICARE

## 2023-02-20 ENCOUNTER — APPOINTMENT (OUTPATIENT)
Dept: GENERAL RADIOLOGY | Age: 63
End: 2023-02-20
Attending: EMERGENCY MEDICINE
Payer: MEDICARE

## 2023-02-20 ENCOUNTER — HOSPITAL ENCOUNTER (EMERGENCY)
Age: 63
Discharge: HOME | End: 2023-02-20
Attending: STUDENT IN AN ORGANIZED HEALTH CARE EDUCATION/TRAINING PROGRAM
Payer: MEDICARE

## 2023-02-20 VITALS
BODY MASS INDEX: 30.37 KG/M2 | OXYGEN SATURATION: 95 % | RESPIRATION RATE: 20 BRPM | WEIGHT: 224.21 LBS | HEART RATE: 93 BPM | TEMPERATURE: 97.5 F | SYSTOLIC BLOOD PRESSURE: 120 MMHG | DIASTOLIC BLOOD PRESSURE: 83 MMHG | HEIGHT: 72 IN

## 2023-02-20 DIAGNOSIS — S09.90XA INJURY OF HEAD, INITIAL ENCOUNTER: Primary | ICD-10-CM

## 2023-02-20 DIAGNOSIS — S92.505A CLOSED NONDISPLACED FRACTURE OF PHALANX OF LESSER TOE OF LEFT FOOT, UNSPECIFIED PHALANX, INITIAL ENCOUNTER: ICD-10-CM

## 2023-02-20 DIAGNOSIS — S49.92XA INJURY OF LEFT SHOULDER, INITIAL ENCOUNTER: ICD-10-CM

## 2023-02-20 PROCEDURE — 73660 X-RAY EXAM OF TOE(S): CPT

## 2023-02-20 PROCEDURE — 99284 EMERGENCY DEPT VISIT MOD MDM: CPT

## 2023-02-20 PROCEDURE — 70450 CT HEAD/BRAIN W/O DYE: CPT

## 2023-02-20 PROCEDURE — 73030 X-RAY EXAM OF SHOULDER: CPT

## 2023-02-20 PROCEDURE — 74011250637 HC RX REV CODE- 250/637: Performed by: STUDENT IN AN ORGANIZED HEALTH CARE EDUCATION/TRAINING PROGRAM

## 2023-02-20 RX ORDER — ACETAMINOPHEN 500 MG
1000 TABLET ORAL ONCE
Status: COMPLETED | OUTPATIENT
Start: 2023-02-20 | End: 2023-02-20

## 2023-02-20 RX ORDER — NAPROXEN 500 MG/1
500 TABLET ORAL 2 TIMES DAILY WITH MEALS
Qty: 10 TABLET | Refills: 0 | Status: SHIPPED | OUTPATIENT
Start: 2023-02-20 | End: 2023-02-25

## 2023-02-20 RX ADMIN — ACETAMINOPHEN 1000 MG: 500 TABLET ORAL at 15:29

## 2023-02-20 NOTE — ED PROVIDER NOTES
EMERGENCY DEPARTMENT HISTORY AND PHYSICAL EXAM      Date: 2/20/2023  Patient Name: Laney Huggins. History of Presenting Illness     Chief Complaint   Patient presents with    Shoulder Injury     Last night, pt slipped off his truck step and he fell backward, with left fifth toe still hung up, and hurt the left shoulder . He is sore all over neck, shoulders and back today, but mostly left shoulder, left fifth toe. No loc but does have bruise back of head. He is Not on a blood thinner. Toe Injury    Fall    Head Injury         HPI: History From: Patient, History limited by: none  Laney Huggins., 58 y.o. male presents to the ED with cc of left fifth toe pain, left shoulder pain after mechanical fall. He is slipped while getting into his truck last night, hit his head, reports a mild headache, pain and bruising of his left fifth toe, and pain of the left shoulder. He is unsure if he lost consciousness. He denies any new weakness numbness or tingling in the arms or legs, no vomiting since incident, he does not take any blood thinners. There are no other complaints, changes, or physical findings at this time. PCP: Fiordaliza Arora MD    No current facility-administered medications on file prior to encounter. Current Outpatient Medications on File Prior to Encounter   Medication Sig Dispense Refill    hydrOXYzine HCL (ATARAX) 25 mg tablet Take 25 mg by mouth nightly. (Patient not taking: Reported on 11/29/2022)      ketoconazole (NIZORAL) 2 % topical cream apply to affected area ON face twice a day for 2 weeks      dupilumab (Dupixent Pen) 200 mg/1.14 mL pnij by SubCUTAneous route. terazosin (HYTRIN) 5 mg capsule Take 1 Capsule by mouth nightly.  90 Capsule 1    atorvastatin (LIPITOR) 10 mg tablet TAKE 1 TABLET EVERY DAY 90 Tablet 1    amLODIPine (NORVASC) 5 mg tablet TAKE 1 TABLET EVERY DAY 90 Tablet 1    metFORMIN (GLUCOPHAGE) 500 mg tablet Take 1 Tablet by mouth two (2) times daily (with meals). 180 Tablet 1    True Metrix Glucose Test Strip strip TEST ONE TIME DAILY 100 Strip 1    lancets (TRUEplus Lancets) 33 gauge misc TEST EVERY  Each 1    DropSafe Alcohol Prep Pads padm TEST EVERY  Pad 1    albuterol (PROVENTIL HFA, VENTOLIN HFA, PROAIR HFA) 90 mcg/actuation inhaler Take 1 Puff by inhalation every four (4) hours as needed for Wheezing.  (Patient not taking: Reported on 11/29/2022) 18 g 1    lancets misc Check once daily 100 Each 1    glucose blood VI test strips (ASCENSIA AUTODISC VI, ONE TOUCH ULTRA TEST VI) strip Check once daily 90 Strip 1       Past History     Past Medical History:  Past Medical History:   Diagnosis Date    Borderline diabetes     Bradycardia 06/2019    per pt they changed BP med and all is resolved; Dr. Daria Pro    Chronic pain     back    Colitis 12/3/2018    Degenerative disc disease, lumbar     Essential hypertension 12/3/2018    GERD (gastroesophageal reflux disease)     hiatal hernia    H/O hernia repair 12/11/2018    History of cholecystectomy 12/11/2018    Ill-defined condition     ruptured gall bladder    Ill-defined condition     hx of decreased O2 sats\" per pt he has been in the 90's and had to have a breathing tx prior to back injection\"     Osteoarthritis of spine with radiculopathy, lumbar region 12/11/2018    Smoker 12/11/2018       Past Surgical History:  Past Surgical History:   Procedure Laterality Date    COLONOSCOPY N/A 12/18/2018    COLONOSCOPY performed by Hitesh Rankin MD at [facility]  12/18/2018         HX CHOLECYSTECTOMY      HX COLONOSCOPY      HX HEENT  06/24/2019    oral surgery on back teeth    HX ORTHOPAEDIC      lt shoulder, muscle and skin graft    HX ORTHOPAEDIC      rt shoulder, anchors in place    HX ORTHOPAEDIC      rt foot, removed bone spur by great toe    HX ORTHOPAEDIC      rt hand, removed infected and cyst on wrist    HX OTHER SURGICAL      incisional hernia       Family History:  Family History   Problem Relation Age of Onset    Cancer Mother     Cancer Father        Social History:  Social History     Tobacco Use    Smoking status: Every Day     Packs/day: 0.50     Years: 50.00     Pack years: 25.00     Types: Cigarettes    Smokeless tobacco: Never   Vaping Use    Vaping Use: Never used   Substance Use Topics    Alcohol use: Yes     Comment: rarely    Drug use: Yes     Types: Marijuana     Comment: uses when \" hurts real bad\"        Allergies: Allergies   Allergen Reactions    Aspirin Other (comments)     Nose bleeds    Morphine Other (comments)     Agitation         Physical Exam   Physical Exam  Constitutional:       General: He is not in acute distress. Appearance: He is not toxic-appearing. HENT:      Head: Normocephalic and atraumatic. Eyes:      Extraocular Movements: Extraocular movements intact. Cardiovascular:      Rate and Rhythm: Normal rate and regular rhythm. Pulmonary:      Effort: Pulmonary effort is normal.      Breath sounds: Normal breath sounds. Abdominal:      Palpations: Abdomen is soft. Tenderness: There is no abdominal tenderness. Musculoskeletal:      Comments: Bruising, swelling and diffuse tenderness over the left fifth toe. No bony tenderness more proximally on the foot. No tenderness of the ankle. Mild diffuse tenderness over the superior and anterior left shoulder without deformity, mild swelling. Full range of motion of this joint. No tenderness over the elbow or wrist.  No midline tenderness of the spine. Tenderness over the right trapezius. Strong radial pulse on the left. Skin:     General: Skin is warm and dry. Neurological:      General: No focal deficit present. Mental Status: He is alert. Cranial Nerves: No cranial nerve deficit. Comments: 5/5 strength with bicep flexion and extension bilaterally, 5/5 strength with ankle flexion and extension bilaterally.  Sensation to light touch intact over upper and lower extremities bilaterally. Full strength with flexion of the left thumb, full strength with spreading of fingers on left hand, full strength with opposition of thumb and index finger of the left hand. Psychiatric:         Mood and Affect: Mood normal.       Diagnostic Study Results     Labs -   No results found for this or any previous visit (from the past 24 hour(s)). Radiologic Studies -   CT HEAD WO CONT   Final Result   1. No evidence of acute intracranial abnormality. XR 5TH TOE LT MIN 2 V   Final Result   Fifth middle phalangeal fracture. XR SHOULDER LT AP/LAT MIN 2 V   Final Result   No acute abnormality. CT Results  (Last 48 hours)                 02/20/23 1550  CT HEAD WO CONT Final result    Impression:  1. No evidence of acute intracranial abnormality. Narrative:  EXAM:  CT HEAD WO CONT       INDICATION:   head trauma       COMPARISON: None. TECHNIQUE: Unenhanced CT of the head was performed using 5 mm images. Brain and   bone windows were generated. CT dose reduction was achieved through use of a   standardized protocol tailored for this examination and automatic exposure   control for dose modulation. FINDINGS:   The ventricles are normal in size and position. Basilar cisterns are patent. No   midline shift. There is no evidence of acute infarct, hemorrhage, or extraaxial   fluid collection. The paranasal sinuses, mastoid air cells, and middle ears are clear. The orbital   contents are within normal limits. There are no significant osseous or   extracranial soft tissue lesions. CXR Results  (Last 48 hours)      None              Medical Decision Making   I am the first provider for this patient. I reviewed the vital signs, available nursing notes, past medical history, past surgical history, family history and social history. Vital Signs-Reviewed the patient's vital signs.   Patient Vitals for the past 24 hrs:   Temp Pulse Resp BP SpO2   02/20/23 1354 97.5 °F (36.4 °C) 93 20 120/83 95 %         Provider Notes (Medical Decision Making):   65-year-old presenting with left fifth toe pain, head injury and left shoulder pain after fall, mechanical.  Differential includes concussion, closed head injury, left shoulder strain or sprain, possible fracture. Concern for toe contusion, strain or sprain, fracture. No midline tenderness of the spine, unlikely acute spinal injury. His neurologic exam is unremarkable. ED Course:     Initial assessment performed. The patients presenting problems have been discussed, and they are in agreement with the care plan formulated and outlined with them. I have encouraged them to ask questions as they arise throughout their visit. Medications   acetaminophen (TYLENOL) tablet 1,000 mg (1,000 mg Oral Given 2/20/23 1529)              Patient CT head shows no acute abnormality, x-ray of the shoulder shows no acute abnormality. X-ray of the toe is independently interpreted by myself as fracture of the fifth phalanx. He is resting comfortably on reevaluation. Patient is counseled on supportive care and return precautions. Will return to the ED for any worsening pain, weakness or numbness, changes in mentation, intractable vomiting, or any new or worrisome symptoms. Will followup with primary care doctor within 2 days. Jacob taping of toes done here. Critical Care Time:         Disposition:  Home  Jignesh Bella Jr.'s  results have been reviewed with him. He has been counseled regarding his diagnosis, treatment, and plan. He verbally conveys understanding and agreement of the signs, symptoms, diagnosis, treatment and prognosis and additionally agrees to follow up as discussed. He also agrees with the care-plan and conveys that all of his questions have been answered.   I have also provided discharge instructions for him that include: educational information regarding their diagnosis and treatment, and list of reasons why they would want to return to the ED prior to their follow-up appointment, should his condition change. PLAN:  1. Current Discharge Medication List        START taking these medications    Details   naproxen (Naprosyn) 500 mg tablet Take 1 Tablet by mouth two (2) times daily (with meals) for 5 days. Qty: 10 Tablet, Refills: 0  Start date: 2/20/2023, End date: 2/25/2023           2.    Follow-up Information       Follow up With Specialties Details Why Contact Info    Natasha Denis MD [facility] Schedule an appointment as soon as possible for a visit in 2 days  [address] 1 [facility]  [phone]      [facility] EMERGENCY DEPT Emergency Medicine  As needed, If symptoms worsen [address] [place] [address]  [address] [facility]  [phone]          Return to ED if worse     Diagnosis     Clinical Impression: Acute head injury, acute left fifth toe fracture, acute left shoulder injury

## 2023-02-20 NOTE — ED NOTES
RN informed pt that since he was driving, MD will not give narcotics prior to discharge. He asked for a prescription. RN asked MD. RN informed patient that MD stated she would not write narcotic prescription for broken toe.

## 2023-04-24 ENCOUNTER — TELEPHONE (OUTPATIENT)
Dept: FAMILY MEDICINE CLINIC | Age: 63
End: 2023-04-24

## 2023-04-24 DIAGNOSIS — M47.26 OSTEOARTHRITIS OF SPINE WITH RADICULOPATHY, LUMBAR REGION: Primary | ICD-10-CM

## 2023-04-24 NOTE — TELEPHONE ENCOUNTER
He wants referral to [facility].  Has been seen there for years but needs new referral.

## 2023-04-24 NOTE — TELEPHONE ENCOUNTER
Pt reports [facility] cannot fill by directions given    Re: predniSONE (STERAPRED DS) 10 mg dose pack       Pls contact them     He doesn't have a working phone number at this time.

## 2023-04-24 NOTE — TELEPHONE ENCOUNTER
Pt needs a referral for a back injection     [facility] [phone]      Best number to reach him is [phone]  ( his phone is broke, leave a message if you miss him)

## 2023-06-20 RX ORDER — AMLODIPINE BESYLATE 5 MG/1
TABLET ORAL
Qty: 90 TABLET | Refills: 0 | Status: SHIPPED | OUTPATIENT
Start: 2023-06-20

## 2023-06-20 RX ORDER — ALBUTEROL SULFATE 90 UG/1
AEROSOL, METERED RESPIRATORY (INHALATION)
Qty: 25.5 G | Refills: 1 | Status: SHIPPED | OUTPATIENT
Start: 2023-06-20

## 2023-06-20 RX ORDER — TERAZOSIN 5 MG/1
CAPSULE ORAL
Qty: 90 CAPSULE | Refills: 0 | Status: SHIPPED | OUTPATIENT
Start: 2023-06-20

## 2023-06-20 RX ORDER — ATORVASTATIN CALCIUM 10 MG/1
TABLET, FILM COATED ORAL
Qty: 90 TABLET | Refills: 0 | Status: SHIPPED | OUTPATIENT
Start: 2023-06-20

## 2023-06-27 RX ORDER — ISOPROPYL ALCOHOL 0.75 G/1
SWAB TOPICAL
Qty: 100 EACH | Refills: 1 | Status: SHIPPED | OUTPATIENT
Start: 2023-06-27

## 2023-06-27 RX ORDER — BLOOD SUGAR DIAGNOSTIC
STRIP MISCELLANEOUS
Qty: 100 EACH | Refills: 1 | Status: SHIPPED | OUTPATIENT
Start: 2023-06-27

## 2023-06-27 RX ORDER — LANCETS 33 GAUGE
EACH MISCELLANEOUS
Qty: 100 EACH | Refills: 1 | Status: SHIPPED | OUTPATIENT
Start: 2023-06-27

## 2023-06-27 RX ORDER — BLOOD-GLUCOSE METER
EACH MISCELLANEOUS
Qty: 1 KIT | Refills: 0 | Status: SHIPPED | OUTPATIENT
Start: 2023-06-27

## 2023-06-27 NOTE — TELEPHONE ENCOUNTER
Last appointment: 4/10/23  Next appointment: 9/18/23    Requested Prescriptions     Pending Prescriptions Disp Refills    blood glucose test strips (ONETOUCH ULTRA) strip 100 each 3     Sig: Check glucose once per day as directed    Lancets (ONETOUCH DELICA PLUS [id number]) MISC 100 each 3     Sig: Check glucose once per day as directed    Blood Glucose Monitoring Suppl (ONE TOUCH ULTRA 2) w/Device KIT 1 kit 0     Sig: Check glucose once per day as directed    Alcohol Swabs (B-D SINGLE USE SWABS REGULAR) PADS 100 each 3     Sig: Check glucose once per day as directed         For Pharmacy Admin Tracking Only    Program: Medication Refill  CPA in place:    Recommendation Provided To:    Intervention Detail: New Rx: 4, reason: Patient Preference  Intervention Accepted By:   Hair Guevara Closed?:    Time Spent (min): 5

## 2023-07-17 RX ORDER — NAPROXEN 500 MG/1
TABLET ORAL
Qty: 30 TABLET | Refills: 2 | Status: SHIPPED | OUTPATIENT
Start: 2023-07-17

## 2023-08-28 RX ORDER — ATORVASTATIN CALCIUM 10 MG/1
TABLET, FILM COATED ORAL
Qty: 90 TABLET | Refills: 0 | Status: SHIPPED | OUTPATIENT
Start: 2023-08-28

## 2023-08-28 RX ORDER — TERAZOSIN 5 MG/1
CAPSULE ORAL
Qty: 90 CAPSULE | Refills: 0 | Status: SHIPPED | OUTPATIENT
Start: 2023-08-28

## 2023-08-28 RX ORDER — AMLODIPINE BESYLATE 5 MG/1
TABLET ORAL
Qty: 90 TABLET | Refills: 0 | Status: SHIPPED | OUTPATIENT
Start: 2023-08-28

## 2023-09-19 RX ORDER — NAPROXEN 500 MG/1
TABLET ORAL
Qty: 90 TABLET | Refills: 0 | Status: SHIPPED | OUTPATIENT
Start: 2023-09-19

## 2023-09-26 LAB
ALBUMIN SERPL-MCNC: 4.2 G/DL (ref 3.5–5)
ALBUMIN/GLOB SERPL: 1.2 (ref 1.1–2.2)
ALP SERPL-CCNC: 84 U/L (ref 45–117)
ALT SERPL-CCNC: 29 U/L (ref 12–78)
ANION GAP SERPL CALC-SCNC: 3 MMOL/L (ref 5–15)
AST SERPL-CCNC: 29 U/L (ref 15–37)
BILIRUB SERPL-MCNC: 0.5 MG/DL (ref 0.2–1)
BUN SERPL-MCNC: 15 MG/DL (ref 6–20)
BUN/CREAT SERPL: 14 (ref 12–20)
CALCIUM SERPL-MCNC: 9.2 MG/DL (ref 8.5–10.1)
CHLORIDE SERPL-SCNC: 106 MMOL/L (ref 97–108)
CO2 SERPL-SCNC: 26 MMOL/L (ref 21–32)
CREAT SERPL-MCNC: 1.09 MG/DL (ref 0.7–1.3)
EST. AVERAGE GLUCOSE BLD GHB EST-MCNC: 114 MG/DL
GLOBULIN SER CALC-MCNC: 3.6 G/DL (ref 2–4)
GLUCOSE SERPL-MCNC: 90 MG/DL (ref 65–100)
HBA1C MFR BLD: 5.6 % (ref 4–5.6)
POTASSIUM SERPL-SCNC: 4.4 MMOL/L (ref 3.5–5.1)
PROT SERPL-MCNC: 7.8 G/DL (ref 6.4–8.2)
SODIUM SERPL-SCNC: 135 MMOL/L (ref 136–145)

## 2023-10-02 ENCOUNTER — OFFICE VISIT (OUTPATIENT)
Age: 63
End: 2023-10-02
Payer: MEDICARE

## 2023-10-02 VITALS
BODY MASS INDEX: 30.42 KG/M2 | OXYGEN SATURATION: 94 % | HEIGHT: 72 IN | HEART RATE: 68 BPM | RESPIRATION RATE: 20 BRPM | WEIGHT: 224.6 LBS | DIASTOLIC BLOOD PRESSURE: 72 MMHG | SYSTOLIC BLOOD PRESSURE: 115 MMHG | TEMPERATURE: 96.3 F

## 2023-10-02 DIAGNOSIS — E11.9 DIABETES MELLITUS TYPE 2, DIET-CONTROLLED (HCC): Primary | ICD-10-CM

## 2023-10-02 DIAGNOSIS — Z23 NEED FOR INFLUENZA VACCINATION: ICD-10-CM

## 2023-10-02 DIAGNOSIS — E78.00 PURE HYPERCHOLESTEROLEMIA: ICD-10-CM

## 2023-10-02 DIAGNOSIS — I10 PRIMARY HYPERTENSION: ICD-10-CM

## 2023-10-02 DIAGNOSIS — Z79.899 ENCOUNTER FOR LONG-TERM (CURRENT) USE OF MEDICATIONS: ICD-10-CM

## 2023-10-02 DIAGNOSIS — N40.1 BENIGN PROSTATIC HYPERPLASIA WITH URINARY HESITANCY: ICD-10-CM

## 2023-10-02 DIAGNOSIS — R39.11 BENIGN PROSTATIC HYPERPLASIA WITH URINARY HESITANCY: ICD-10-CM

## 2023-10-02 PROCEDURE — G8427 DOCREV CUR MEDS BY ELIG CLIN: HCPCS | Performed by: FAMILY MEDICINE

## 2023-10-02 PROCEDURE — 2022F DILAT RTA XM EVC RTNOPTHY: CPT | Performed by: FAMILY MEDICINE

## 2023-10-02 PROCEDURE — 99214 OFFICE O/P EST MOD 30 MIN: CPT | Performed by: FAMILY MEDICINE

## 2023-10-02 PROCEDURE — 3044F HG A1C LEVEL LT 7.0%: CPT | Performed by: FAMILY MEDICINE

## 2023-10-02 PROCEDURE — 4004F PT TOBACCO SCREEN RCVD TLK: CPT | Performed by: FAMILY MEDICINE

## 2023-10-02 PROCEDURE — 3017F COLORECTAL CA SCREEN DOC REV: CPT | Performed by: FAMILY MEDICINE

## 2023-10-02 PROCEDURE — PBSHW INFLUENZA, FLUCELVAX, (AGE 6 MO+), IM, PF, 0.5 ML: Performed by: FAMILY MEDICINE

## 2023-10-02 PROCEDURE — G8417 CALC BMI ABV UP PARAM F/U: HCPCS | Performed by: FAMILY MEDICINE

## 2023-10-02 PROCEDURE — 90674 CCIIV4 VAC NO PRSV 0.5 ML IM: CPT | Performed by: FAMILY MEDICINE

## 2023-10-02 PROCEDURE — 3074F SYST BP LT 130 MM HG: CPT | Performed by: FAMILY MEDICINE

## 2023-10-02 PROCEDURE — G8482 FLU IMMUNIZE ORDER/ADMIN: HCPCS | Performed by: FAMILY MEDICINE

## 2023-10-02 PROCEDURE — 3078F DIAST BP <80 MM HG: CPT | Performed by: FAMILY MEDICINE

## 2023-10-02 RX ORDER — ATORVASTATIN CALCIUM 10 MG/1
10 TABLET, FILM COATED ORAL DAILY
Qty: 90 TABLET | Refills: 1 | Status: SHIPPED | OUTPATIENT
Start: 2023-10-02

## 2023-10-02 RX ORDER — AMLODIPINE BESYLATE 5 MG/1
5 TABLET ORAL DAILY
Qty: 90 TABLET | Refills: 1 | Status: SHIPPED | OUTPATIENT
Start: 2023-10-02

## 2023-10-02 RX ORDER — TERAZOSIN 5 MG/1
CAPSULE ORAL
Qty: 90 CAPSULE | Refills: 1 | Status: SHIPPED | OUTPATIENT
Start: 2023-10-02

## 2023-10-02 RX ORDER — DUPILUMAB 300 MG/2ML
INJECTION, SOLUTION SUBCUTANEOUS
COMMUNITY
Start: 2023-09-28

## 2023-10-02 SDOH — ECONOMIC STABILITY: FOOD INSECURITY: WITHIN THE PAST 12 MONTHS, THE FOOD YOU BOUGHT JUST DIDN'T LAST AND YOU DIDN'T HAVE MONEY TO GET MORE.: NEVER TRUE

## 2023-10-02 SDOH — ECONOMIC STABILITY: FOOD INSECURITY: WITHIN THE PAST 12 MONTHS, YOU WORRIED THAT YOUR FOOD WOULD RUN OUT BEFORE YOU GOT MONEY TO BUY MORE.: NEVER TRUE

## 2023-10-02 SDOH — ECONOMIC STABILITY: HOUSING INSECURITY
IN THE LAST 12 MONTHS, WAS THERE A TIME WHEN YOU DID NOT HAVE A STEADY PLACE TO SLEEP OR SLEPT IN A SHELTER (INCLUDING NOW)?: NO

## 2023-10-02 SDOH — ECONOMIC STABILITY: INCOME INSECURITY: HOW HARD IS IT FOR YOU TO PAY FOR THE VERY BASICS LIKE FOOD, HOUSING, MEDICAL CARE, AND HEATING?: NOT HARD AT ALL

## 2023-10-02 ASSESSMENT — ANXIETY QUESTIONNAIRES
GAD7 TOTAL SCORE: 0
6. BECOMING EASILY ANNOYED OR IRRITABLE: 0
5. BEING SO RESTLESS THAT IT IS HARD TO SIT STILL: 0
4. TROUBLE RELAXING: 0
1. FEELING NERVOUS, ANXIOUS, OR ON EDGE: 0
7. FEELING AFRAID AS IF SOMETHING AWFUL MIGHT HAPPEN: 0
3. WORRYING TOO MUCH ABOUT DIFFERENT THINGS: 0
2. NOT BEING ABLE TO STOP OR CONTROL WORRYING: 0
IF YOU CHECKED OFF ANY PROBLEMS ON THIS QUESTIONNAIRE, HOW DIFFICULT HAVE THESE PROBLEMS MADE IT FOR YOU TO DO YOUR WORK, TAKE CARE OF THINGS AT HOME, OR GET ALONG WITH OTHER PEOPLE: NOT DIFFICULT AT ALL

## 2023-10-02 ASSESSMENT — PATIENT HEALTH QUESTIONNAIRE - PHQ9
SUM OF ALL RESPONSES TO PHQ QUESTIONS 1-9: 0
1. LITTLE INTEREST OR PLEASURE IN DOING THINGS: 0
SUM OF ALL RESPONSES TO PHQ9 QUESTIONS 1 & 2: 0
SUM OF ALL RESPONSES TO PHQ QUESTIONS 1-9: 0
SUM OF ALL RESPONSES TO PHQ QUESTIONS 1-9: 0
2. FEELING DOWN, DEPRESSED OR HOPELESS: 0
SUM OF ALL RESPONSES TO PHQ QUESTIONS 1-9: 0

## 2023-10-02 NOTE — PROGRESS NOTES
Chief Complaint   Patient presents with    Follow-up Chronic Condition     5 mo check    1. Have you been to the ER, urgent care clinic since your last visit? Hospitalized since your last visit? Yes ER    2. Have you seen or consulted any other health care providers outside of the [address] [place] [address] Avenue since your last visit? Include any pap smears or colon screening. Yes        Order placed for Flucelvax from provider Dr. Joseph Talavera, verified by Verbal Order Read Back with provider. Observed for 15 min, no reaction.
with radiculopathy, lumbar region    Smoker    Opioid use, unspecified with unspecified opioid-induced disorder ([address])    Benign prostatic hyperplasia with urinary hesitancy    Pure hypercholesterolemia       Stephanie Wilson MD  10/3/2023

## 2023-11-15 RX ORDER — PEN NEEDLE, DIABETIC 31 GX5/16"
NEEDLE, DISPOSABLE MISCELLANEOUS
Qty: 100 EACH | Refills: 2 | Status: SHIPPED | OUTPATIENT
Start: 2023-11-15

## 2023-12-10 DIAGNOSIS — R39.11 BENIGN PROSTATIC HYPERPLASIA WITH URINARY HESITANCY: ICD-10-CM

## 2023-12-10 DIAGNOSIS — E78.00 PURE HYPERCHOLESTEROLEMIA: ICD-10-CM

## 2023-12-10 DIAGNOSIS — I10 PRIMARY HYPERTENSION: ICD-10-CM

## 2023-12-10 DIAGNOSIS — N40.1 BENIGN PROSTATIC HYPERPLASIA WITH URINARY HESITANCY: ICD-10-CM

## 2023-12-12 RX ORDER — AMLODIPINE BESYLATE 5 MG/1
5 TABLET ORAL DAILY
Qty: 100 TABLET | Refills: 0 | Status: SHIPPED | OUTPATIENT
Start: 2023-12-12

## 2023-12-12 RX ORDER — TERAZOSIN 5 MG/1
CAPSULE ORAL
Qty: 100 CAPSULE | Refills: 0 | Status: SHIPPED | OUTPATIENT
Start: 2023-12-12

## 2023-12-12 RX ORDER — ATORVASTATIN CALCIUM 10 MG/1
10 TABLET, FILM COATED ORAL DAILY
Qty: 100 TABLET | Refills: 0 | Status: SHIPPED | OUTPATIENT
Start: 2023-12-12

## 2023-12-12 NOTE — TELEPHONE ENCOUNTER
Pharmacy is requesting a 100 d/s with refills enough to last a year. Last appointment: 10/2/23  Next appointment: 3/4/24    Requested Prescriptions     Pending Prescriptions Disp Refills    terazosin (HYTRIN) 5 MG capsule [Pharmacy Med Name: Terazosin HCl 5 MG Oral Capsule] 100 capsule 3     Sig: TAKE 1 CAPSULE BY MOUTH AT NIGHT    atorvastatin (LIPITOR) 10 MG tablet [Pharmacy Med Name: Atorvastatin Calcium 10 MG Oral Tablet] 100 tablet 3     Sig: TAKE 1 TABLET BY MOUTH DAILY    amLODIPine (NORVASC) 5 MG tablet [Pharmacy Med Name: amLODIPine Besylate 5 MG Oral Tablet] 100 tablet 3     Sig: TAKE 1 TABLET BY MOUTH DAILY         For Pharmacy Admin Tracking Only    Program: Medication Refill  CPA in place:    Recommendation Provided To:    Intervention Detail: New Rx: 3, reason: Patient Preference  Intervention Accepted By:   Javier Lilly Closed?:    Time Spent (min): 5

## 2023-12-27 NOTE — TELEPHONE ENCOUNTER
Pharmacy is requesting a 100 d/s with refills enough to last a year.    Last appointment: 10/2/23  Next appointment: 3/4/24    Requested Prescriptions     Pending Prescriptions Disp Refills    albuterol sulfate HFA (PROVENTIL;VENTOLIN;PROAIR) 108 (90 Base) MCG/ACT inhaler [Pharmacy Med Name: ALBUTEROL HFA 90MCG/ACT (PA)] 3 each 3     Sig: USE 1 INHALATION BY MOUTH EVERY  4 HOURS AS NEEDED FOR WHEEZING    blood glucose test strips (ONETOUCH ULTRA) strip [Pharmacy Med Name: OneTouch Ultra In Vitro Strip] 100 strip 3     Sig: CHECK BLOOD GLUCOSE ONCE DAILY  AS DIRECTED    Lancets (ONETOUCH DELICA PLUS [id number]) MISC [Pharmacy Med Name: OneTouch Delica Plus [id number]] 100 each 3     Sig: CHECK BLOOD GLUCOSE ONCE DAILY  AS DIRECTED    Blood Glucose Monitoring Suppl (ONE TOUCH ULTRA 2) w/Device KIT [Pharmacy Med Name: OneTouch Ultra 2 w/Device Kit] 1 kit 0     Sig: CHECK BLOOD GLUCOSE ONCE DAILY  AS DIRECTED    naproxen (NAPROSYN) 500 MG tablet [Pharmacy Med Name: Naproxen 500 MG Oral Tablet] 100 tablet 3     Sig: TAKE 1 TABLET BY MOUTH DAILY AS  NEEDED FOR PAIN         For Pharmacy Admin Tracking Only    Program: Medication Refill  CPA in place:    Recommendation Provided To:   Intervention Detail: New Rx: 5, reason: Patient Preference  Intervention Accepted By:   Gap Closed?:    Time Spent (min): 5

## 2024-01-02 RX ORDER — NAPROXEN 500 MG/1
500 TABLET ORAL
Qty: 100 TABLET | Refills: 0 | Status: SHIPPED | OUTPATIENT
Start: 2024-01-02

## 2024-01-02 RX ORDER — LANCETS 33 GAUGE
EACH MISCELLANEOUS
Qty: 100 EACH | Refills: 1 | Status: SHIPPED | OUTPATIENT
Start: 2024-01-02

## 2024-01-02 RX ORDER — BLOOD SUGAR DIAGNOSTIC
STRIP MISCELLANEOUS
Qty: 100 STRIP | Refills: 1 | Status: SHIPPED | OUTPATIENT
Start: 2024-01-02

## 2024-01-02 RX ORDER — BLOOD-GLUCOSE METER
EACH MISCELLANEOUS
Qty: 1 KIT | Refills: 1 | Status: SHIPPED | OUTPATIENT
Start: 2024-01-02

## 2024-01-02 RX ORDER — ALBUTEROL SULFATE 90 UG/1
AEROSOL, METERED RESPIRATORY (INHALATION)
Qty: 3 EACH | Refills: 1 | Status: SHIPPED | OUTPATIENT
Start: 2024-01-02

## 2024-01-10 ENCOUNTER — TELEMEDICINE (OUTPATIENT)
Age: 64
End: 2024-01-10
Payer: MEDICARE

## 2024-01-10 ENCOUNTER — TELEPHONE (OUTPATIENT)
Age: 64
End: 2024-01-10

## 2024-01-10 DIAGNOSIS — U07.1 POSITIVE SELF-ADMINISTERED ANTIGEN TEST FOR COVID-19: Primary | ICD-10-CM

## 2024-01-10 DIAGNOSIS — D84.9 IMMUNOSUPPRESSED STATUS (HCC): ICD-10-CM

## 2024-01-10 PROCEDURE — 3017F COLORECTAL CA SCREEN DOC REV: CPT | Performed by: FAMILY MEDICINE

## 2024-01-10 PROCEDURE — 99213 OFFICE O/P EST LOW 20 MIN: CPT | Performed by: FAMILY MEDICINE

## 2024-01-10 PROCEDURE — G8482 FLU IMMUNIZE ORDER/ADMIN: HCPCS | Performed by: FAMILY MEDICINE

## 2024-01-10 PROCEDURE — G8427 DOCREV CUR MEDS BY ELIG CLIN: HCPCS | Performed by: FAMILY MEDICINE

## 2024-01-10 PROCEDURE — 4004F PT TOBACCO SCREEN RCVD TLK: CPT | Performed by: FAMILY MEDICINE

## 2024-01-10 PROCEDURE — G8417 CALC BMI ABV UP PARAM F/U: HCPCS | Performed by: FAMILY MEDICINE

## 2024-01-10 RX ORDER — BENZONATATE 200 MG/1
200 CAPSULE ORAL 3 TIMES DAILY PRN
Qty: 30 CAPSULE | Refills: 0 | Status: SHIPPED | OUTPATIENT
Start: 2024-01-10 | End: 2024-01-20

## 2024-01-10 RX ORDER — METHYLPREDNISOLONE 4 MG/1
TABLET ORAL
Qty: 21 TABLET | Refills: 0 | Status: SHIPPED | OUTPATIENT
Start: 2024-01-10 | End: 2024-01-16

## 2024-01-10 NOTE — PROGRESS NOTES
Chief Complaint   Patient presents with    Positive For Covid-19     Symptoms started a week ago & cold chills last night       1. Have you been to the ER, urgent care clinic since your last visit?  Hospitalized since your last visit?No    2. Have you seen or consulted any other health care providers outside of the [facility] since your last visit?  Include any pap smears or colon screening. No

## 2024-01-10 NOTE — PROGRESS NOTES
1/10/2024    TELEHEALTH EVALUATION -- Audio/Visual    HPI:    Elie Connelly  (:  1960) has requested an audio/video evaluation for the following concern(s):    5 days ago started having sinus congestion, headache.  Now having cold chills. Dry cough X 3 weeks. Feeling SOB.  Denies fever.  Feeling doing worse past 2 days.   Did a covid test last night and this morning was positive.    If doing worse to go to ED. If Pulse ox read <90 resting to ED.       Review of Systems    Prior to Visit Medications    Medication Sig Taking? Authorizing Provider   nirmatrelvir/ritonavir 300/100 (PAXLOVID) 20 x 150 MG & 10 x 100MG TBPK Take 3 tablets (two 150 mg nirmatrelvir and one 100 mg ritonavir tablets) by mouth every 12 hours for 5 days. Yes Ines Wilson MD   benzonatate (TESSALON) 200 MG capsule Take 1 capsule by mouth 3 times daily as needed for Cough Yes Ines Wilson MD   methylPREDNISolone (MEDROL DOSEPACK) 4 MG tablet Follow taper dose instruction Yes Ines Wilson MD   albuterol sulfate HFA (PROVENTIL;VENTOLIN;PROAIR) 108 (90 Base) MCG/ACT inhaler USE 1 INHALATION BY MOUTH EVERY  4 HOURS AS NEEDED FOR WHEEZING Yes Ines Wilson MD   blood glucose test strips (ONETOUCH ULTRA) strip CHECK BLOOD GLUCOSE ONCE DAILY  AS DIRECTED Yes Ines Wilson MD   Lancets (ONETOUCH DELICA PLUS [id number]) MISC CHECK BLOOD GLUCOSE ONCE DAILY  AS DIRECTED Yes Ines Wilson MD   Blood Glucose Monitoring Suppl (ONE TOUCH ULTRA 2) w/Device KIT CHECK BLOOD GLUCOSE ONCE DAILY  AS DIRECTED Yes Ines Wilson MD   terazosin (HYTRIN) 5 MG capsule TAKE 1 CAPSULE BY MOUTH AT NIGHT Yes Ines Wilson MD   atorvastatin (LIPITOR) 10 MG tablet TAKE 1 TABLET BY MOUTH DAILY Yes Ines Wilson MD   amLODIPine (NORVASC) 5 MG tablet TAKE 1 TABLET BY MOUTH DAILY Yes Ines Wilson MD   Alcohol Swabs (ALCOHOL PREP) PADS Use once daily Yes Ines Wilson MD   DUPIXENT 300 MG/2ML SOPN injection  Yes Provider, MD Juni   naproxen (NAPROSYN)

## 2024-01-10 NOTE — TELEPHONE ENCOUNTER
Pt reports dry cough, chills, congestion, weak tired, headache for 1 wk  Tested positive for Covid today    Wants to know if he should take any medication     Best number to reach him is [phone]

## 2024-02-22 DIAGNOSIS — E78.00 PURE HYPERCHOLESTEROLEMIA: ICD-10-CM

## 2024-02-22 DIAGNOSIS — N40.1 BENIGN PROSTATIC HYPERPLASIA WITH URINARY HESITANCY: ICD-10-CM

## 2024-02-22 DIAGNOSIS — E11.9 DIABETES MELLITUS TYPE 2, DIET-CONTROLLED (HCC): ICD-10-CM

## 2024-02-22 DIAGNOSIS — Z79.899 ENCOUNTER FOR LONG-TERM (CURRENT) USE OF MEDICATIONS: ICD-10-CM

## 2024-02-22 DIAGNOSIS — R39.11 BENIGN PROSTATIC HYPERPLASIA WITH URINARY HESITANCY: ICD-10-CM

## 2024-02-22 DIAGNOSIS — I10 PRIMARY HYPERTENSION: ICD-10-CM

## 2024-02-23 LAB
ALBUMIN SERPL-MCNC: 3.9 G/DL (ref 3.5–5)
ALBUMIN/GLOB SERPL: 1.3 (ref 1.1–2.2)
ALP SERPL-CCNC: 87 U/L (ref 45–117)
ALT SERPL-CCNC: 25 U/L (ref 12–78)
ANION GAP SERPL CALC-SCNC: 1 MMOL/L (ref 5–15)
AST SERPL-CCNC: 14 U/L (ref 15–37)
BILIRUB SERPL-MCNC: 0.7 MG/DL (ref 0.2–1)
BUN SERPL-MCNC: 15 MG/DL (ref 6–20)
BUN/CREAT SERPL: 16 (ref 12–20)
CALCIUM SERPL-MCNC: 9.5 MG/DL (ref 8.5–10.1)
CHLORIDE SERPL-SCNC: 109 MMOL/L (ref 97–108)
CHOLEST SERPL-MCNC: 128 MG/DL
CO2 SERPL-SCNC: 27 MMOL/L (ref 21–32)
CREAT SERPL-MCNC: 0.94 MG/DL (ref 0.7–1.3)
CREAT UR-MCNC: 87.7 MG/DL
ERYTHROCYTE [DISTWIDTH] IN BLOOD BY AUTOMATED COUNT: 14.2 % (ref 11.5–14.5)
EST. AVERAGE GLUCOSE BLD GHB EST-MCNC: 126 MG/DL
GLOBULIN SER CALC-MCNC: 3.1 G/DL (ref 2–4)
GLUCOSE SERPL-MCNC: 108 MG/DL (ref 65–100)
HBA1C MFR BLD: 6 % (ref 4–5.6)
HCT VFR BLD AUTO: 47.7 % (ref 36.6–50.3)
HDLC SERPL-MCNC: 51 MG/DL
HDLC SERPL: 2.5 (ref 0–5)
HGB BLD-MCNC: 15.9 G/DL (ref 12.1–17)
LDLC SERPL CALC-MCNC: 55.4 MG/DL (ref 0–100)
MCH RBC QN AUTO: 30.8 PG (ref 26–34)
MCHC RBC AUTO-ENTMCNC: 33.3 G/DL (ref 30–36.5)
MCV RBC AUTO: 92.4 FL (ref 80–99)
MICROALBUMIN UR-MCNC: 0.67 MG/DL
MICROALBUMIN/CREAT UR-RTO: 8 MG/G (ref 0–30)
NRBC # BLD: 0 K/UL (ref 0–0.01)
NRBC BLD-RTO: 0 PER 100 WBC
PLATELET # BLD AUTO: 414 K/UL (ref 150–400)
PMV BLD AUTO: 10.5 FL (ref 8.9–12.9)
POTASSIUM SERPL-SCNC: 4.7 MMOL/L (ref 3.5–5.1)
PROT SERPL-MCNC: 7 G/DL (ref 6.4–8.2)
PSA SERPL-MCNC: 0.9 NG/ML (ref 0.01–4)
RBC # BLD AUTO: 5.16 M/UL (ref 4.1–5.7)
SODIUM SERPL-SCNC: 137 MMOL/L (ref 136–145)
TRIGL SERPL-MCNC: 108 MG/DL
TSH SERPL DL<=0.05 MIU/L-ACNC: 2.2 UIU/ML (ref 0.36–3.74)
VLDLC SERPL CALC-MCNC: 21.6 MG/DL
WBC # BLD AUTO: 8.7 K/UL (ref 4.1–11.1)

## 2024-03-04 ENCOUNTER — OFFICE VISIT (OUTPATIENT)
Age: 64
End: 2024-03-04
Payer: MEDICARE

## 2024-03-04 VITALS
RESPIRATION RATE: 18 BRPM | WEIGHT: 228.2 LBS | BODY MASS INDEX: 30.91 KG/M2 | DIASTOLIC BLOOD PRESSURE: 85 MMHG | SYSTOLIC BLOOD PRESSURE: 136 MMHG | HEIGHT: 72 IN | TEMPERATURE: 96.1 F | OXYGEN SATURATION: 92 % | HEART RATE: 74 BPM

## 2024-03-04 DIAGNOSIS — I10 PRIMARY HYPERTENSION: ICD-10-CM

## 2024-03-04 DIAGNOSIS — R39.11 BENIGN PROSTATIC HYPERPLASIA WITH URINARY HESITANCY: ICD-10-CM

## 2024-03-04 DIAGNOSIS — E11.9 DIABETES MELLITUS TYPE 2, DIET-CONTROLLED (HCC): ICD-10-CM

## 2024-03-04 DIAGNOSIS — F17.200 SMOKER: ICD-10-CM

## 2024-03-04 DIAGNOSIS — N40.1 BENIGN PROSTATIC HYPERPLASIA WITH URINARY HESITANCY: ICD-10-CM

## 2024-03-04 DIAGNOSIS — E78.00 PURE HYPERCHOLESTEROLEMIA: ICD-10-CM

## 2024-03-04 DIAGNOSIS — Z00.00 MEDICARE ANNUAL WELLNESS VISIT, SUBSEQUENT: Primary | ICD-10-CM

## 2024-03-04 DIAGNOSIS — J43.9 PULMONARY EMPHYSEMA, UNSPECIFIED EMPHYSEMA TYPE (HCC): ICD-10-CM

## 2024-03-04 DIAGNOSIS — Z79.899 LONG-TERM USE OF HIGH-RISK MEDICATION: ICD-10-CM

## 2024-03-04 PROCEDURE — 99214 OFFICE O/P EST MOD 30 MIN: CPT | Performed by: FAMILY MEDICINE

## 2024-03-04 PROCEDURE — G0296 VISIT TO DETERM LDCT ELIG: HCPCS | Performed by: FAMILY MEDICINE

## 2024-03-04 RX ORDER — TRIAMCINOLONE ACETONIDE 1 MG/G
CREAM TOPICAL
COMMUNITY
Start: 2024-02-14

## 2024-03-04 RX ORDER — TERAZOSIN 10 MG/1
10 CAPSULE ORAL NIGHTLY
Qty: 90 CAPSULE | Refills: 1 | Status: SHIPPED | OUTPATIENT
Start: 2024-03-04

## 2024-03-04 RX ORDER — AMLODIPINE BESYLATE 5 MG/1
5 TABLET ORAL DAILY
Qty: 100 TABLET | Refills: 1 | Status: SHIPPED | OUTPATIENT
Start: 2024-03-04

## 2024-03-04 RX ORDER — ALBUTEROL SULFATE 90 UG/1
AEROSOL, METERED RESPIRATORY (INHALATION)
Qty: 3 EACH | Refills: 1 | Status: SHIPPED | OUTPATIENT
Start: 2024-03-04

## 2024-03-04 RX ORDER — ATORVASTATIN CALCIUM 10 MG/1
10 TABLET, FILM COATED ORAL DAILY
Qty: 100 TABLET | Refills: 1 | Status: SHIPPED | OUTPATIENT
Start: 2024-03-04

## 2024-03-04 ASSESSMENT — PATIENT HEALTH QUESTIONNAIRE - PHQ9
SUM OF ALL RESPONSES TO PHQ QUESTIONS 1-9: 0
1. LITTLE INTEREST OR PLEASURE IN DOING THINGS: 0
SUM OF ALL RESPONSES TO PHQ QUESTIONS 1-9: 0
SUM OF ALL RESPONSES TO PHQ QUESTIONS 1-9: 0
SUM OF ALL RESPONSES TO PHQ9 QUESTIONS 1 & 2: 0
SUM OF ALL RESPONSES TO PHQ QUESTIONS 1-9: 0
2. FEELING DOWN, DEPRESSED OR HOPELESS: 0

## 2024-03-04 ASSESSMENT — LIFESTYLE VARIABLES
HOW MANY STANDARD DRINKS CONTAINING ALCOHOL DO YOU HAVE ON A TYPICAL DAY: PATIENT DOES NOT DRINK
HOW OFTEN DO YOU HAVE A DRINK CONTAINING ALCOHOL: NEVER

## 2024-03-04 NOTE — PROGRESS NOTES
Medicare Annual Wellness Visit    Elie Higginstejal Singleton is here for Medicare AWV and Follow-up Chronic Condition    Assessment & Plan   Medicare annual wellness visit, subsequent  Primary hypertension  -     amLODIPine (NORVASC) 5 MG tablet; Take 1 tablet by mouth daily, Disp-100 tablet, R-1Please send a replace/new response with 100-Day Supply if appropriate to maximize member benefit. Requesting 1 year supply.Normal  Pure hypercholesterolemia  -     atorvastatin (LIPITOR) 10 MG tablet; Take 1 tablet by mouth daily, Disp-100 tablet, R-1Please send a replace/new response with 100-Day Supply if appropriate to maximize member benefit. Requesting 1 year supply.Normal  Benign prostatic hyperplasia with urinary hesitancy  -     terazosin (HYTRIN) 10 MG capsule; Take 1 capsule by mouth nightly, Disp-90 capsule, R-1Please send a replace/new response with 100-Day Supply if appropriate to maximize member benefit. Requesting 1 year supply.Normal  Pulmonary emphysema, unspecified emphysema type (HCC)  -     albuterol sulfate HFA (PROVENTIL;VENTOLIN;PROAIR) 108 (90 Base) MCG/ACT inhaler; USE 1 INHALATION BY MOUTH EVERY  4 HOURS AS NEEDED FOR SOB, Disp-3 each, R-1Normal  Diabetes mellitus type 2, diet-controlled (HCC)  Smoker  -     [place] VISIT TO DISCUSS LUNG CA SCREEN W LDCT  -     CT Lung Screen (Initial/Annual/Baseline); Future  Long-term use of high-risk medication  Recommendations for Preventive Services Due: see orders and patient instructions/AVS.  Recommended screening schedule for the next 5-10 years is provided to the patient in written form: see Patient Instructions/AVS.     Return in about 4 months (around 7/4/2024) for DM2, HTN, HLD, COPD, meds, labs.         Subjective   The following acute and/or chronic problems were also addressed today:    Retired now.   Worked construction.        has a past medical history of Borderline diabetes, Bradycardia, Chronic pain, Colitis, Degenerative disc disease, lumbar,

## 2024-03-04 NOTE — PATIENT INSTRUCTIONS
think you may have a problem with alcohol or drug use, talk to your doctor.   Medicines    Take your medicines exactly as prescribed. Call your doctor if you think you are having a problem with your medicine.     If your doctor recommends aspirin, take the amount directed each day. Make sure you take aspirin and not another kind of pain reliever, such as acetaminophen (Tylenol).   When should you call for help?   Call 911 if you have symptoms of a heart attack. These may include:    Chest pain or pressure, or a strange feeling in the chest.     Sweating.     Shortness of breath.     Pain, pressure, or a strange feeling in the back, neck, jaw, or upper belly or in one or both shoulders or arms.     Lightheadedness or sudden weakness.     A fast or irregular heartbeat.   After you call 911, the  may tell you to chew 1 adult-strength or 2 to 4 low-dose aspirin. Wait for an ambulance. Do not try to drive yourself.  Watch closely for changes in your health, and be sure to contact your doctor if you have any problems.  Where can you learn more?  Go to https://www.[employer].net/patientEd and enter F075 to learn more about \"A Healthy Heart: Care Instructions.\"  Current as of: June 25, 2023               Content Version: 13.9  © 2006-2023 [employer].   Care instructions adapted under license by [employer]. If you have questions about a medical condition or this instruction, always ask your healthcare professional. [employer] disclaims any warranty or liability for your use of this information.      Personalized Preventive Plan for Elie Connelly . - 3/4/2024  Medicare offers a range of preventive health benefits. Some of the tests and screenings are paid in full while other may be subject to a deductible, co-insurance, and/or copay.    Some of these benefits include a comprehensive review of your medical history including lifestyle, illnesses that may run in your family, and various

## 2024-03-04 NOTE — PROGRESS NOTES
Chief Complaint   Patient presents with    Medicare AW    Follow-up Chronic Condition     5 mo check    1. Have you been to the ER, urgent care clinic since your last visit?  Hospitalized since your last visit?Yes UC    2. Have you seen or consulted any other health care providers outside of the [facility] since your last visit?  Include any pap smears or colon screening. No

## 2024-03-15 ENCOUNTER — HOSPITAL ENCOUNTER (OUTPATIENT)
Facility: HOSPITAL | Age: 64
End: 2024-03-15
Attending: FAMILY MEDICINE
Payer: MEDICARE

## 2024-03-15 DIAGNOSIS — F17.200 SMOKER: ICD-10-CM

## 2024-03-15 PROCEDURE — 71271 CT THORAX LUNG CANCER SCR C-: CPT

## 2024-05-28 DIAGNOSIS — R39.11 BENIGN PROSTATIC HYPERPLASIA WITH URINARY HESITANCY: ICD-10-CM

## 2024-05-28 DIAGNOSIS — N40.1 BENIGN PROSTATIC HYPERPLASIA WITH URINARY HESITANCY: ICD-10-CM

## 2024-05-30 RX ORDER — TERAZOSIN 10 MG/1
10 CAPSULE ORAL NIGHTLY
Qty: 100 CAPSULE | Refills: 0 | Status: SHIPPED | OUTPATIENT
Start: 2024-05-30

## 2024-06-11 RX ORDER — BLOOD SUGAR DIAGNOSTIC
STRIP MISCELLANEOUS
Qty: 100 STRIP | Refills: 2 | Status: SHIPPED | OUTPATIENT
Start: 2024-06-11

## 2024-06-11 RX ORDER — LANCETS 33 GAUGE
EACH MISCELLANEOUS
Qty: 100 EACH | Refills: 2 | Status: SHIPPED | OUTPATIENT
Start: 2024-06-11

## 2024-07-24 DIAGNOSIS — I10 PRIMARY HYPERTENSION: ICD-10-CM

## 2024-07-24 DIAGNOSIS — E78.00 PURE HYPERCHOLESTEROLEMIA: ICD-10-CM

## 2024-07-24 RX ORDER — AMLODIPINE BESYLATE 5 MG/1
5 TABLET ORAL DAILY
Qty: 100 TABLET | Refills: 0 | Status: SHIPPED | OUTPATIENT
Start: 2024-07-24

## 2024-07-24 RX ORDER — ATORVASTATIN CALCIUM 10 MG/1
10 TABLET, FILM COATED ORAL DAILY
Qty: 100 TABLET | Refills: 0 | Status: SHIPPED | OUTPATIENT
Start: 2024-07-24

## 2024-07-26 ENCOUNTER — TELEPHONE (OUTPATIENT)
Age: 64
End: 2024-07-26

## 2024-07-26 NOTE — TELEPHONE ENCOUNTER
Patient returned phone call and says he never received glucometer that was ordered by Dr. Wilson back in January and has not checked his BS in 6-months because of it. He says it may have gotten mixed up in the mail because he moved. Suggested to patient to call pharmacy  and explain what happened to see if they will send him a new glucometer.

## 2024-07-26 NOTE — TELEPHONE ENCOUNTER
Patient had prescriptions filled but it went to the wrong pharmacy    Atorvastatin 10 mg    Amlodipine 5 mg    Also the wrong Glucose supplies was sent to him and wont fit into his machine    It was sent to [facility] and the correct pharmacy is    [facility]    Patient # [phone]

## 2024-07-26 NOTE — TELEPHONE ENCOUNTER
Had patient's prescriptions transferred to [facility]. Patient has enough diabetic supplies to last until August the 22nd and has enough refills per pharmacy. Per pharmacy representative patient got the same supplies he got in may and in January. The supplies are correct per pharmacy representative. Attempted to return patient's phone call but he did not answer.

## 2024-08-21 DIAGNOSIS — E78.00 PURE HYPERCHOLESTEROLEMIA: ICD-10-CM

## 2024-08-21 DIAGNOSIS — I10 PRIMARY HYPERTENSION: ICD-10-CM

## 2024-08-21 NOTE — TELEPHONE ENCOUNTER
Donald is requesting a 100 day supply  Previous Rx's were sent to [facility]    Last appointment: 3/4/24  Next appointment: 10/31/24    Requested Prescriptions     Pending Prescriptions Disp Refills    amLODIPine (NORVASC) 5 MG tablet [Pharmacy Med Name: amLODIPine Besylate 5 MG Oral Tablet] 100 tablet 0     Sig: Take 1 tablet by mouth daily    atorvastatin (LIPITOR) 10 MG tablet [Pharmacy Med Name: Atorvastatin Calcium 10 MG Oral Tablet] 100 tablet 0     Sig: Take 1 tablet by mouth daily         For Pharmacy Admin Tracking Only    Program: Medication Refill  CPA in place:    Recommendation Provided To:   Intervention Detail: New Rx: 2, reason: Improve Adherence  Intervention Accepted By:   Gap Closed?:    Time Spent (min): 5

## 2024-08-23 RX ORDER — AMLODIPINE BESYLATE 5 MG/1
5 TABLET ORAL DAILY
Qty: 100 TABLET | Refills: 0 | Status: SHIPPED | OUTPATIENT
Start: 2024-08-23

## 2024-08-23 RX ORDER — ATORVASTATIN CALCIUM 10 MG/1
10 TABLET, FILM COATED ORAL DAILY
Qty: 100 TABLET | Refills: 0 | Status: SHIPPED | OUTPATIENT
Start: 2024-08-23

## 2024-09-03 DIAGNOSIS — R39.11 BENIGN PROSTATIC HYPERPLASIA WITH URINARY HESITANCY: ICD-10-CM

## 2024-09-03 DIAGNOSIS — N40.1 BENIGN PROSTATIC HYPERPLASIA WITH URINARY HESITANCY: ICD-10-CM

## 2024-09-03 DIAGNOSIS — J43.9 PULMONARY EMPHYSEMA, UNSPECIFIED EMPHYSEMA TYPE (HCC): ICD-10-CM

## 2024-09-04 RX ORDER — TERAZOSIN 10 MG/1
10 CAPSULE ORAL NIGHTLY
Qty: 100 CAPSULE | Refills: 0 | Status: SHIPPED | OUTPATIENT
Start: 2024-09-04

## 2024-09-04 RX ORDER — ALBUTEROL SULFATE 90 UG/1
AEROSOL, METERED RESPIRATORY (INHALATION)
Qty: 3 EACH | Refills: 1 | Status: SHIPPED | OUTPATIENT
Start: 2024-09-04

## 2024-09-24 DIAGNOSIS — N40.1 BENIGN PROSTATIC HYPERPLASIA WITH URINARY HESITANCY: ICD-10-CM

## 2024-09-24 DIAGNOSIS — R39.11 BENIGN PROSTATIC HYPERPLASIA WITH URINARY HESITANCY: ICD-10-CM

## 2024-09-26 RX ORDER — TERAZOSIN 10 MG/1
10 CAPSULE ORAL NIGHTLY
Qty: 100 CAPSULE | Refills: 0 | Status: SHIPPED | OUTPATIENT
Start: 2024-09-26

## 2024-10-07 ENCOUNTER — HOSPITAL ENCOUNTER (EMERGENCY)
Facility: HOSPITAL | Age: 64
Discharge: PSYCHIATRIC OR SUBSTANCE USE FACILITY | End: 2024-10-08
Payer: MEDICARE

## 2024-10-07 DIAGNOSIS — N39.0 ACUTE UTI: ICD-10-CM

## 2024-10-07 DIAGNOSIS — Z71.1 MENTAL HEALTH-RELATED COMPLAINT: Primary | ICD-10-CM

## 2024-10-07 LAB
ALBUMIN SERPL-MCNC: 4 G/DL (ref 3.5–5)
ALBUMIN/GLOB SERPL: 1.1 (ref 1.1–2.2)
ALP SERPL-CCNC: 79 U/L (ref 45–117)
ALT SERPL-CCNC: 44 U/L (ref 12–78)
AMPHET UR QL SCN: POSITIVE
ANION GAP SERPL CALC-SCNC: 10 MMOL/L (ref 2–12)
APAP SERPL-MCNC: <2 UG/ML (ref 10–30)
APPEARANCE UR: ABNORMAL
AST SERPL-CCNC: 43 U/L (ref 15–37)
BACTERIA URNS QL MICRO: ABNORMAL /HPF
BARBITURATES UR QL SCN: NEGATIVE
BASOPHILS # BLD: 0.1 K/UL (ref 0–0.1)
BASOPHILS NFR BLD: 1 % (ref 0–1)
BENZODIAZ UR QL: NEGATIVE
BILIRUB SERPL-MCNC: 1.2 MG/DL (ref 0.2–1)
BILIRUB UR QL: NEGATIVE
BUN SERPL-MCNC: 17 MG/DL (ref 6–20)
BUN/CREAT SERPL: 18 (ref 12–20)
CALCIUM SERPL-MCNC: 9.2 MG/DL (ref 8.5–10.1)
CANNABINOIDS UR QL SCN: POSITIVE
CHLORIDE SERPL-SCNC: 103 MMOL/L (ref 97–108)
CO2 SERPL-SCNC: 24 MMOL/L (ref 21–32)
COCAINE UR QL SCN: NEGATIVE
COLOR UR: ABNORMAL
COMMENT:: NORMAL
CREAT SERPL-MCNC: 0.96 MG/DL (ref 0.7–1.3)
DIFFERENTIAL METHOD BLD: ABNORMAL
EOSINOPHIL # BLD: 0.4 K/UL (ref 0–0.4)
EOSINOPHIL NFR BLD: 4 % (ref 0–7)
EPITH CASTS URNS QL MICRO: ABNORMAL /LPF
ERYTHROCYTE [DISTWIDTH] IN BLOOD BY AUTOMATED COUNT: 14.3 % (ref 11.5–14.5)
ETHANOL SERPL-MCNC: <10 MG/DL (ref 0–0.08)
FLUAV RNA SPEC QL NAA+PROBE: NOT DETECTED
FLUBV RNA SPEC QL NAA+PROBE: NOT DETECTED
GLOBULIN SER CALC-MCNC: 3.6 G/DL (ref 2–4)
GLUCOSE SERPL-MCNC: 130 MG/DL (ref 65–100)
GLUCOSE UR STRIP.AUTO-MCNC: NEGATIVE MG/DL
HCT VFR BLD AUTO: 47.8 % (ref 36.6–50.3)
HGB BLD-MCNC: 16 G/DL (ref 12.1–17)
HGB UR QL STRIP: NEGATIVE
IMM GRANULOCYTES # BLD AUTO: 0 K/UL (ref 0–0.04)
IMM GRANULOCYTES NFR BLD AUTO: 0 % (ref 0–0.5)
KETONES UR QL STRIP.AUTO: 40 MG/DL
LEUKOCYTE ESTERASE UR QL STRIP.AUTO: ABNORMAL
LYMPHOCYTES # BLD: 1.5 K/UL (ref 0.8–3.5)
LYMPHOCYTES NFR BLD: 14 % (ref 12–49)
Lab: ABNORMAL
MCH RBC QN AUTO: 31.2 PG (ref 26–34)
MCHC RBC AUTO-ENTMCNC: 33.5 G/DL (ref 30–36.5)
MCV RBC AUTO: 93.2 FL (ref 80–99)
METHADONE UR QL: NEGATIVE
MONOCYTES # BLD: 1.1 K/UL (ref 0–1)
MONOCYTES NFR BLD: 10 % (ref 5–13)
NEUTS SEG # BLD: 7.3 K/UL (ref 1.8–8)
NEUTS SEG NFR BLD: 71 % (ref 32–75)
NITRITE UR QL STRIP.AUTO: POSITIVE
NRBC # BLD: 0 K/UL (ref 0–0.01)
NRBC BLD-RTO: 0 PER 100 WBC
OPIATES UR QL: NEGATIVE
PCP UR QL: NEGATIVE
PH UR STRIP: 6 (ref 5–8)
PLATELET # BLD AUTO: 361 K/UL (ref 150–400)
PMV BLD AUTO: 10.2 FL (ref 8.9–12.9)
POTASSIUM SERPL-SCNC: 3.6 MMOL/L (ref 3.5–5.1)
PROT SERPL-MCNC: 7.6 G/DL (ref 6.4–8.2)
PROT UR STRIP-MCNC: 30 MG/DL
RBC # BLD AUTO: 5.13 M/UL (ref 4.1–5.7)
RBC #/AREA URNS HPF: ABNORMAL /HPF (ref 0–5)
SARS-COV-2 RNA RESP QL NAA+PROBE: NOT DETECTED
SODIUM SERPL-SCNC: 137 MMOL/L (ref 136–145)
SOURCE: NORMAL
SP GR UR REFRACTOMETRY: 1.02
SPECIMEN HOLD: NORMAL
UROBILINOGEN UR QL STRIP.AUTO: 1 EU/DL (ref 0.2–1)
WBC # BLD AUTO: 10.4 K/UL (ref 4.1–11.1)
WBC URNS QL MICRO: ABNORMAL /HPF (ref 0–4)

## 2024-10-07 PROCEDURE — 87088 URINE BACTERIA CULTURE: CPT

## 2024-10-07 PROCEDURE — 87086 URINE CULTURE/COLONY COUNT: CPT

## 2024-10-07 PROCEDURE — 85025 COMPLETE CBC W/AUTO DIFF WBC: CPT

## 2024-10-07 PROCEDURE — 80053 COMPREHEN METABOLIC PANEL: CPT

## 2024-10-07 PROCEDURE — 80307 DRUG TEST PRSMV CHEM ANLYZR: CPT

## 2024-10-07 PROCEDURE — 36415 COLL VENOUS BLD VENIPUNCTURE: CPT

## 2024-10-07 PROCEDURE — 81001 URINALYSIS AUTO W/SCOPE: CPT

## 2024-10-07 PROCEDURE — 87186 SC STD MICRODIL/AGAR DIL: CPT

## 2024-10-07 PROCEDURE — 80143 DRUG ASSAY ACETAMINOPHEN: CPT

## 2024-10-07 PROCEDURE — 87636 SARSCOV2 & INF A&B AMP PRB: CPT

## 2024-10-07 PROCEDURE — 99285 EMERGENCY DEPT VISIT HI MDM: CPT

## 2024-10-07 PROCEDURE — 82077 ASSAY SPEC XCP UR&BREATH IA: CPT

## 2024-10-07 PROCEDURE — 6370000000 HC RX 637 (ALT 250 FOR IP): Performed by: PHYSICIAN ASSISTANT

## 2024-10-07 RX ORDER — CIPROFLOXACIN 500 MG/1
500 TABLET, FILM COATED ORAL EVERY 12 HOURS SCHEDULED
Status: DISCONTINUED | OUTPATIENT
Start: 2024-10-08 | End: 2024-10-08 | Stop reason: HOSPADM

## 2024-10-07 RX ORDER — FUROSEMIDE 20 MG/1
20 TABLET ORAL
Status: COMPLETED | OUTPATIENT
Start: 2024-10-07 | End: 2024-10-07

## 2024-10-07 RX ORDER — CIPROFLOXACIN 500 MG/1
500 TABLET, FILM COATED ORAL
Status: COMPLETED | OUTPATIENT
Start: 2024-10-07 | End: 2024-10-07

## 2024-10-07 RX ADMIN — FUROSEMIDE 20 MG: 20 TABLET ORAL at 18:52

## 2024-10-07 RX ADMIN — CIPROFLOXACIN HYDROCHLORIDE 500 MG: 500 TABLET, FILM COATED ORAL at 21:14

## 2024-10-07 ASSESSMENT — PAIN SCALES - GENERAL: PAINLEVEL_OUTOF10: 0

## 2024-10-07 ASSESSMENT — LIFESTYLE VARIABLES
HOW OFTEN DO YOU HAVE A DRINK CONTAINING ALCOHOL: NEVER
HOW MANY STANDARD DRINKS CONTAINING ALCOHOL DO YOU HAVE ON A TYPICAL DAY: PATIENT DOES NOT DRINK

## 2024-10-07 NOTE — ED PROVIDER NOTES
[facility] EMERGENCY DEPT  EMERGENCY DEPARTMENT ENCOUNTER       Pt Name: Elie Connelly Jr.  MRN: [id number]  Birthdate 1960  Date of evaluation: 10/7/2024  Provider: YULY Barros   PCP: No primary care provider on file.  Note Started: 4:26 PM EDT 10/7/24     CHIEF COMPLAINT       Chief Complaint   Patient presents with    Mental Health Problem     Pt arrives w CC of HI.         HISTORY OF PRESENT ILLNESS: 1 or more elements      History From: Patient and Police / Law Enforcement  Mental Health Disorder     Elie Connelly Jr. is a 64 y.o. male  who presents to the ED for mental health evaluation.  Patient states he called his mental health provider and they told him to get to a taxi to [place]. On my evaluation patient is concerned the police at bedside with him are fake police. Describes different scenarios about people who are out to get him. He told police he had homicidal ideations (although declines HI on my evaluation). Told staff he has not slept in 2 days. Patient states he called his mental health provider because he was noticing his \"mental state going down hill.\"   Patient is minimally cooperative on questioning but denies any medical related complaints at this time. Denies fevers, CP, SOB, abdominal pain. States he is otherwise in his usual state of health.         Nursing Notes were all reviewed and agreed with or any disagreements were addressed in the HPI.     REVIEW OF SYSTEMS      Review of Systems   All other systems reviewed and are negative.       Positives and Pertinent negatives as per HPI.    PAST HISTORY     Past Medical History:  Past Medical History:   Diagnosis Date    Borderline diabetes     Bradycardia 06/2019    per pt they changed BP med and all is resolved; Dr. Duran    Chronic pain     back    Colitis 12/3/2018    Degenerative disc disease, lumbar     Essential hypertension 12/3/2018    GERD (gastroesophageal reflux disease)     hiatal hernia    H/O hernia repair  Education       Exclude from Growth Chart         Physical Exam  Constitutional:       Appearance: Normal appearance.   HENT:      Head: Normocephalic and atraumatic.   Eyes:      Extraocular Movements: Extraocular movements intact.      Conjunctiva/sclera: Conjunctivae normal.   Cardiovascular:      Rate and Rhythm: Normal rate.   Pulmonary:      Effort: Pulmonary effort is normal.   Abdominal:      General: There is no distension.   Musculoskeletal:         General: Normal range of motion.      Cervical back: Normal range of motion.   Skin:     General: Skin is warm and dry.   Neurological:      General: No focal deficit present.      Mental Status: He is alert and oriented to person, place, and time.   Psychiatric:         Mood and Affect: Affect is angry.         Behavior: Behavior is agitated.         Thought Content: Thought content is paranoid and delusional.          DIAGNOSTIC RESULTS   LABS:     Recent Results (from the past 24 hour(s))   Acetaminophen Level    Collection Time: 10/07/24  3:58 PM   Result Value Ref Range    Acetaminophen Level <2 (L) 10 - 30 ug/mL   CBC with Auto Differential    Collection Time: 10/07/24  3:58 PM   Result Value Ref Range    WBC 10.4 4.1 - 11.1 K/uL    RBC 5.13 4.10 - 5.70 M/uL    Hemoglobin 16.0 12.1 - 17.0 g/dL    Hematocrit 47.8 36.6 - 50.3 %    MCV 93.2 80.0 - 99.0 FL    MCH 31.2 26.0 - 34.0 PG    MCHC 33.5 30.0 - 36.5 g/dL    RDW 14.3 11.5 - 14.5 %    Platelets 361 150 - 400 K/uL    MPV 10.2 8.9 - 12.9 FL    Nucleated RBCs 0.0 0  WBC    nRBC 0.00 0.00 - 0.01 K/uL    Neutrophils % 71 32 - 75 %    Lymphocytes % 14 12 - 49 %    Monocytes % 10 5 - 13 %    Eosinophils % 4 0 - 7 %    Basophils % 1 0 - 1 %    Immature Granulocytes % 0 0.0 - 0.5 %    Neutrophils Absolute 7.3 1.8 - 8.0 K/UL    Lymphocytes Absolute 1.5 0.8 - 3.5 K/UL    Monocytes Absolute 1.1 (H) 0.0 - 1.0 K/UL    Eosinophils Absolute 0.4 0.0 - 0.4 K/UL    Basophils Absolute 0.1 0.0 - 0.1 K/UL    Immature  Ref Range    Color, UA DARK YELLOW      Appearance CLOUDY (A) CLEAR      Specific Gravity, UA 1.025      pH, Urine 6.0 5.0 - 8.0      Protein, UA 30 (A) NEG mg/dL    Glucose, Ur Negative NEG mg/dL    Ketones, Urine 40 (A) NEG mg/dL    Bilirubin, Urine Negative NEG      Blood, Urine Negative NEG      Urobilinogen, Urine 1.0 0.2 - 1.0 EU/dL    Nitrite, Urine Positive (A) NEG      Leukocyte Esterase, Urine MODERATE (A) NEG      WBC, UA 10-20 0 - 4 /hpf    RBC, UA 0-5 0 - 5 /hpf    Epithelial Cells, UA FEW FEW /lpf    BACTERIA, URINE 3+ (A) NEG /hpf       EKG: When ordered, EKG's are interpreted by the Emergency Department Physician in the absence of a cardiologist.  Please see their note for interpretation of EKG.      RADIOLOGY:  Non-plain film images such as CT, Ultrasound and MRI are read by the radiologist. Plain radiographic images are visualized and preliminarily interpreted by the ED Provider with the below findings:          Interpretation per the Radiologist below, if available at the time of this note:     No orders to display        PROCEDURES   Unless otherwise noted below, none  Procedures     CRITICAL CARE TIME       EMERGENCY DEPARTMENT COURSE and DIFFERENTIAL DIAGNOSIS/MDM   Vitals:    Vitals:    10/07/24 1530 10/07/24 1852 10/07/24 1900   BP: (!) 147/91 (!) 143/98    Pulse: 89     Resp: 18     Temp:   98.6 °F (37 °C)   SpO2: 91%          Patient was given the following medications:  Medications   ciprofloxacin (CIPRO) tablet 500 mg (has no administration in time range)   furosemide (LASIX) tablet 20 mg (20 mg Oral Given 10/7/24 1852)       CONSULTS: (Who and What was discussed)  IP CONSULT TO BSMART    Chronic Conditions: As noted above    Social Determinants affecting Dx or Tx: Patient is homeless.    Records Reviewed (source and summary of external records): Nursing Notes, Old Medical Records, Previous Radiology Studies, and Previous Laboratory Studies  Reviewed prior records including prior

## 2024-10-07 NOTE — ED NOTES
ATTEMPTED TO CONTACT THE ON CALL BSMART SPECIALIST STARTING AT 1545 AND HAVE CONTINUALLY CALLED THE ON CALL NUMBER AND STILL HAVE NOT BEEN ABLE TO GIVE THE BSMART CONSULT TO THE SPECIALIST. I HAVE ALSO CONTACTED THE OFFICE SEVERAL TIMES, BUT IT GOES TO VOICEMAIL. WILL CONTINUE TRYING

## 2024-10-07 NOTE — ED NOTES
Pt lying in bed at this time. Pt in paper scrubs and hospital gripper socks under restraints. Pt placed in metal bilateral wrist, bilateral ankle and a waist restraint; pt has developed a skin tear to L wrist due to moving around - this RN asked PD to loosen handcuff; metal restraint sign placed above bed & on door. Pt's bilateral wrist & bilateral ankle PVD WDL & skin intact. Heena Tech at bedside as 1:1 sitter per hospital policy alongside officer at bedside. Bed locked & in lowest position, x2 side rails up. Offered pt opportunity to use restroom at this time.

## 2024-10-07 NOTE — ED NOTES
Pt refusing to let this tech obtain temperature stating \"This taste bitter, you're trying to make me test positive for something!\" Rn made aware

## 2024-10-07 NOTE — ED NOTES
This RN in waiting room speaking to patient. Pt refusing to wear pt armband, repeatedly stating that the registrar is \"one of the ones following me.\" Pt states he is seeking mental health admission d/t HI against \"the people following me.\", but repeatedly refusing to accompany this RN for triage. Pt walked outside of waiting room into parking lot standing next to parked truck. [employer] on scene att. Pt continues to be non-compliant. Pt provided [employer] officer with keys to park truck, truck parked without incident. Pt remains uncooperative and making paranoid statements.     1440 Pt eloped conversation and walking around hospital campus att, [employer] in pursuit

## 2024-10-07 NOTE — ED NOTES
Pt being escorted by [place] police into room at this time. Pt being uncooperative at this time.

## 2024-10-07 NOTE — ED NOTES
Pt lying in bed at this time. All pt belongings placed in labeled pt belonging bag and set outside room. Pt placed in paper scrubs and hospital gripper socks under restraints. Pt placed in metal bilateral wrist, bilateral ankle and a waist restraint; PVD WDL and skin intact w no visible wounds; metal restraint sign placed above bed & on door. Pt's bilateral wrist & bilateral ankle PVD WDL & skin intact. Heena Tech at bedside as 1:1 sitter per hospital policy alongside officer at bedside. Bed locked & in lowest position, x2 side rails up. Offered pt opportunity to use restroom at this time.

## 2024-10-07 NOTE — ED NOTES
[place] police and this RN assisted pt in changing into paper scrubs. All pt belongings (hat, shorts, belt, socks, shoes, shirt, wallet, phone, toilet paper and receipts) placed in labeled patient belonging bag outside of pt's room.

## 2024-10-07 NOTE — BSMART NOTE
This counselor is aware of Bsmart consult. However, prior to assessment, patient became uncooperative, refused treatment, and left the ED. He was brought back to ED by Lucio IVERSON and was subsequently placed in restraints. Patient is currently being assessed by Lucio Kaminski and will likely be a TDO.

## 2024-10-07 NOTE — ED NOTES
This RN gave SBAR report to Niharika MAYA at this time. Offered oncoming RN the opportunity to ask any questions. This RN stated pertinent pt information, additionally informed oncoming RN of tasks that still need to be completed. Pt's bed locked & in lowest position and call light w/in reach. Emergency equipment at bedside.

## 2024-10-07 NOTE — ED NOTES
Pt lying in bed at this time. Pt in paper scrubs and hospital gripper socks under restraints. Pt in metal bilateral wrist, bilateral ankle and a waist restraint; pt has developed a skin tear to L wrist due to moving around - this RN asked PD to loosen handcuff; metal restraint sign placed above bed & on door. Pt's bilateral wrist & bilateral ankle PVD WDL & skin intact. Heena Tech at bedside as 1:1 sitter per hospital policy alongside officer at bedside. Bed locked & in lowest position, x2 side rails up. Offered pt opportunity to use restroom at this time. This RN offered pt a drink but pt states \"I don't trust it, y'all put something in it\". This RN opened drink in front of pt, pt states \"I don't want it\"

## 2024-10-08 VITALS
WEIGHT: 225 LBS | DIASTOLIC BLOOD PRESSURE: 90 MMHG | OXYGEN SATURATION: 92 % | HEART RATE: 71 BPM | BODY MASS INDEX: 30.52 KG/M2 | TEMPERATURE: 98.2 F | RESPIRATION RATE: 22 BRPM | SYSTOLIC BLOOD PRESSURE: 148 MMHG

## 2024-10-08 PROCEDURE — 6370000000 HC RX 637 (ALT 250 FOR IP): Performed by: EMERGENCY MEDICINE

## 2024-10-08 RX ORDER — FUROSEMIDE 20 MG/1
20 TABLET ORAL DAILY
Status: DISCONTINUED | OUTPATIENT
Start: 2024-10-08 | End: 2024-10-08 | Stop reason: HOSPADM

## 2024-10-08 RX ORDER — ATORVASTATIN CALCIUM 10 MG/1
10 TABLET, FILM COATED ORAL NIGHTLY
Status: DISCONTINUED | OUTPATIENT
Start: 2024-10-08 | End: 2024-10-08 | Stop reason: HOSPADM

## 2024-10-08 RX ORDER — DOXAZOSIN 2 MG/1
8 TABLET ORAL ONCE
Status: DISCONTINUED | OUTPATIENT
Start: 2024-10-08 | End: 2024-10-08 | Stop reason: HOSPADM

## 2024-10-08 RX ORDER — AMLODIPINE BESYLATE 5 MG/1
5 TABLET ORAL DAILY
Status: DISCONTINUED | OUTPATIENT
Start: 2024-10-08 | End: 2024-10-08 | Stop reason: HOSPADM

## 2024-10-08 RX ADMIN — FUROSEMIDE 20 MG: 20 TABLET ORAL at 11:47

## 2024-10-08 RX ADMIN — AMLODIPINE BESYLATE 5 MG: 5 TABLET ORAL at 11:47

## 2024-10-08 RX ADMIN — CIPROFLOXACIN HYDROCHLORIDE 500 MG: 500 TABLET, FILM COATED ORAL at 11:47

## 2024-10-08 NOTE — ED NOTES
Assumed care of pt. Pt lying in bed calmly and conversing appropriately. Pt noted to be in paper scrubs with hospital gripper socks. Pt in bilateral metal wrist and ankle restraint, PVD WDL and skin intact. Tech at bedside as 1:1 sitter per hospital policy along with officer at bedside. Bed locked and in lowest position, x2 side rails up. Metal restraint sign placed above bed and door.    Offer pt opportunity to use restroom at this time and pt declined. Offered to provide pt with water and crackers until dietary brings meals and pt declined.    Pt denying SI/ HI and denies hearing auditory/ visual hallucinations at this time.

## 2024-10-08 NOTE — ED NOTES
Pt has been calm with no outburst on my shift (1900). Pt denies SI/HI. Having appropriate conversations with sitter and officer. Pt understands we are looking for metal health placement. Was calm and understanding when served TDO papers.     Pt understood dx of UTI and understood he will start antibiotics here and cont during psych stay.

## 2024-10-08 NOTE — ED NOTES
Arnoldo from [facility] called and provided information about transfer facility. [phone]. Accepting Provider is Dr. Hira Johnson

## 2024-10-08 NOTE — ED NOTES
TRANSFER - OUT REPORT:    Verbal report given to ASHLEY Lozano RN on Elie Connelly Jr.  being transferred to [facility] for routine progression of patient care       Report consisted of patient's Situation, Background, Assessment and   Recommendations(SBAR).     Information from the following report(s) ED Encounter Summary, ED SBAR, Adult Overview, MAR, Recent Results, and Neuro Assessment was reviewed with the receiving nurse.    [place] Fall Assessment:    Presents to emergency department  because of falls (Syncope, seizure, or loss of consciousness): No  Age > 70: No  Altered Mental Status, Intoxication with alcohol or substance confusion (Disorientation, impaired judgment, poor safety awaremess, or inability to follow instructions): Yes  Impaired Mobility: Ambulates or transfers with assistive devices or assistance; Unable to ambulate or transer.: Yes  Nursing Judgement: Yes          Lines:       Opportunity for questions and clarification was provided.

## 2024-10-08 NOTE — ED NOTES
Pt reporting to tech that is sitting, \"I want to kill everyone that lies but I do not want to hurt anyone\"

## 2024-10-08 NOTE — ED NOTES
This RN attempted to medicate patient. Patient refusing to take medications, educated this patient about being in custody and the importance of taking medications for treatment. Patient continues to refuse to take medications. Patient also states,\"I am going to refuse to eat or drink anything, for all I care, I could die here.\" Lucio montelongo at bedside attempting to explain situation to patient. Patient continues to refuse medications. Primary RN notified.

## 2024-10-08 NOTE — ED NOTES
[employer]uties transporting pt to [facility]. Paperwork provided to officers to give to transfer facility.  Patient ambulatory out of ED with ESTEFANY. No acute distress noted.

## 2024-10-08 NOTE — ED NOTES
Pt speaking loudly to tech stating \" You are all assholes. You might have poisoned his breakfast\"

## 2024-10-08 NOTE — ED NOTES
Pt stating \"everyone is lying about the fact that I am refusing treatment\". Attempted to redirect. Pt resting comfortably

## 2024-10-08 NOTE — BSMART NOTE
BSMART Liaison Team Note     LOS: 20 hours    Patient goal(s) for today:  take medications as prescribed, make needs known in an appropriate manner, engage in supportive counseling as needed  BSMART Liaison team focus/goals: assess needs, provide support and education, coordinate care, provide supportive counseling as needed     Progress note: Liaison met f/f with pt in his ER room at [facility]. Officer posted at pt's room. 1:1 constant observer at pt's bedside. Pt appears sitting up at the end of his bed interacting with an ER nurse. Pt presents as alert, irritable, anxious, tense, paranoid, with dysphoric mood and affect. Pt appears oriented to person, place (hospital), and some of his situation (came into hospital b/c \"people were following me around\"). Nurse attempting to prompt pt to take his medication. He did eventually comply with a great deal of supportive counseling and reassurance by nurse and this liaison.     Pt explains course of events that have led him to this ER admission. He tells liaison that he has been [place] 2024. He became homeless after his landlord became sick and . The daughter raised the rent substantially and pt could no longer afford to live at the residence. Pt unable to find adequate housing. Pt perseverates about a woman at [facility] who told him to go there and they would find him housing through the [facility] on [facility]. (Pt says that he is from [place] and he drove here.) In the mean time, pt frustrated b/c \"all of these people have been chasing me around.\" Pt claims that these people have followed him to the hospital, and he sees them here. Pt wonders if PCT is one of them. He says that he doesn't trust anybody. Pt denies SI, although he doesn't give an [expletive] if he dies. He says that he has no HI \"right now.\" When asked if he has AH/VH, pt replies that he has \"multiple personalities.\" He says that he hears 3 other people talking to  him, including a kid, and old man, and a woman. He ultimately denies VH at present. He says that he barely sleeps b/c he has to sleep sitting up, hypervigilant in the woods. He reports \"not eating like I'm supposed to.\"     Liaison provide supportive counseling, emotional support, validation of pt's feelings, reassurance, and reflective listening to help calm pt's fears. Discussed the TDO process with him.     [facility] including Rachana and Mariella, to assist with placement process. Bed search has yielded no private bed placement at present. Updated nursing notes requested as well as medical clearance note by a physician. Advised appropriate nursing staff. Pt reportedly number [facility] on the [facility] bed waiting list. Liaison will continue to monitor and to support.      Barriers to Discharge: [facility] TDO bed search     Outpatient provider(s):  none reported  Insurance info/prescription coverage: [employer] MEDICARE ADVANTAGE HMO, [place]ID [place]     Diagnosis: Per [facility] Preadmission screening report: Unspecified Psychosis    Plan:  Pt is a [facility] TDO bed search. .Please defer to most recent psychiatric consult for updated recommendations and/or disposition.   Follow up Psych Consult placed? Yes.   Psychiatrist updated? No     Participating treatment team members: Elie Connelly Jr., Jw Piper LCSW

## 2024-10-08 NOTE — ED NOTES
Tech reports that pt is stating the following: \" I want a .\" \" I am going to shoot the motherfuckers that are following him.\"  \" We are basically out to get him\" \" I shouldn't be in custody and I need another ride to facility.\"

## 2024-10-08 NOTE — ED NOTES
Bedside and Verbal shift change report given to Smita (oncoming nurse) by Padmini (offgoing nurse). Report included the following information ED SBAR, Adult Overview, MAR, Recent Results, and Neuro Assessment.

## 2024-10-08 NOTE — ED NOTES
Assumed care of pt at this time. Pt resting on stretcher. Bilateral wrist and ankle restraints on pt. ESTEFANY and 1:1 sitter at bedside. Room is stripped. No further needs expressed by pt at this time.

## 2024-10-08 NOTE — ED NOTES
This RN attempted to provide pt something to eat and drink. Pt declined. Pt sitting at the edge of bed refusing to get back into stretcher. Pt stating \" I lost everything\"

## 2024-10-10 LAB
BACTERIA SPEC CULT: ABNORMAL
CC UR VC: ABNORMAL
SERVICE CMNT-IMP: ABNORMAL

## 2025-05-29 ENCOUNTER — HOSPITAL ENCOUNTER (OUTPATIENT)
Facility: HOSPITAL | Age: 65
Discharge: HOME | End: 2025-06-01

## 2025-05-29 ENCOUNTER — OFFICE VISIT (OUTPATIENT)
Age: 65
End: 2025-05-29
Payer: MEDICARE

## 2025-05-29 VITALS
HEART RATE: 67 BPM | HEIGHT: 72 IN | RESPIRATION RATE: 20 BRPM | DIASTOLIC BLOOD PRESSURE: 82 MMHG | OXYGEN SATURATION: 90 % | TEMPERATURE: 98 F | BODY MASS INDEX: 32.23 KG/M2 | WEIGHT: 238 LBS | SYSTOLIC BLOOD PRESSURE: 115 MMHG

## 2025-05-29 DIAGNOSIS — E11.9 DIABETES MELLITUS TYPE 2, DIET-CONTROLLED (HCC): ICD-10-CM

## 2025-05-29 DIAGNOSIS — E78.00 PURE HYPERCHOLESTEROLEMIA: ICD-10-CM

## 2025-05-29 DIAGNOSIS — N52.9 ERECTILE DYSFUNCTION, UNSPECIFIED ERECTILE DYSFUNCTION TYPE: ICD-10-CM

## 2025-05-29 DIAGNOSIS — I10 PRIMARY HYPERTENSION: ICD-10-CM

## 2025-05-29 DIAGNOSIS — Z12.5 SCREENING FOR MALIGNANT NEOPLASM OF PROSTATE: ICD-10-CM

## 2025-05-29 DIAGNOSIS — Z87.891 PERSONAL HISTORY OF TOBACCO USE: ICD-10-CM

## 2025-05-29 DIAGNOSIS — Z91.81 AT HIGH RISK FOR FALLS: ICD-10-CM

## 2025-05-29 DIAGNOSIS — Z00.00 MEDICARE ANNUAL WELLNESS VISIT, SUBSEQUENT: Primary | ICD-10-CM

## 2025-05-29 DIAGNOSIS — R39.11 BENIGN PROSTATIC HYPERPLASIA WITH URINARY HESITANCY: ICD-10-CM

## 2025-05-29 DIAGNOSIS — N40.1 BENIGN PROSTATIC HYPERPLASIA WITH URINARY HESITANCY: ICD-10-CM

## 2025-05-29 DIAGNOSIS — F17.200 SMOKER: ICD-10-CM

## 2025-05-29 DIAGNOSIS — J43.9 PULMONARY EMPHYSEMA, UNSPECIFIED EMPHYSEMA TYPE (HCC): ICD-10-CM

## 2025-05-29 LAB
ALBUMIN SERPL-MCNC: 3.9 G/DL (ref 3.5–5)
ALBUMIN/GLOB SERPL: 1.1 (ref 1.1–2.2)
ALP SERPL-CCNC: 96 U/L (ref 45–117)
ALT SERPL-CCNC: 51 U/L (ref 12–78)
ANION GAP SERPL CALC-SCNC: 9 MMOL/L (ref 2–12)
AST SERPL-CCNC: 50 U/L (ref 15–37)
BASOPHILS # BLD: 0.06 K/UL (ref 0–0.1)
BASOPHILS NFR BLD: 0.7 % (ref 0–1)
BILIRUB SERPL-MCNC: 0.4 MG/DL (ref 0.2–1)
BUN SERPL-MCNC: 13 MG/DL (ref 6–20)
BUN/CREAT SERPL: 14 (ref 12–20)
CALCIUM SERPL-MCNC: 9.7 MG/DL (ref 8.5–10.1)
CHLORIDE SERPL-SCNC: 107 MMOL/L (ref 97–108)
CHOLEST SERPL-MCNC: 125 MG/DL
CO2 SERPL-SCNC: 24 MMOL/L (ref 21–32)
CREAT SERPL-MCNC: 0.93 MG/DL (ref 0.7–1.3)
DIFFERENTIAL METHOD BLD: ABNORMAL
EOSINOPHIL # BLD: 0.36 K/UL (ref 0–0.4)
EOSINOPHIL NFR BLD: 4.5 % (ref 0–7)
ERYTHROCYTE [DISTWIDTH] IN BLOOD BY AUTOMATED COUNT: 14.3 % (ref 11.5–14.5)
EST. AVERAGE GLUCOSE BLD GHB EST-MCNC: 148 MG/DL
GLOBULIN SER CALC-MCNC: 3.6 G/DL (ref 2–4)
GLUCOSE SERPL-MCNC: 101 MG/DL (ref 65–100)
HBA1C MFR BLD: 6.8 % (ref 4–5.6)
HCT VFR BLD AUTO: 54.5 % (ref 36.6–50.3)
HDLC SERPL-MCNC: 43 MG/DL
HDLC SERPL: 2.9 (ref 0–5)
HGB BLD-MCNC: 17.5 G/DL (ref 12.1–17)
IMM GRANULOCYTES # BLD AUTO: 0.03 K/UL (ref 0–0.04)
IMM GRANULOCYTES NFR BLD AUTO: 0.4 % (ref 0–0.5)
LDLC SERPL CALC-MCNC: 53 MG/DL (ref 0–100)
LYMPHOCYTES # BLD: 2.19 K/UL (ref 0.8–3.5)
LYMPHOCYTES NFR BLD: 27.3 % (ref 12–49)
MCH RBC QN AUTO: 30.6 PG (ref 26–34)
MCHC RBC AUTO-ENTMCNC: 32.1 G/DL (ref 30–36.5)
MCV RBC AUTO: 95.3 FL (ref 80–99)
MONOCYTES # BLD: 0.75 K/UL (ref 0–1)
MONOCYTES NFR BLD: 9.4 % (ref 5–13)
NEUTS SEG # BLD: 4.62 K/UL (ref 1.8–8)
NEUTS SEG NFR BLD: 57.7 % (ref 32–75)
NRBC # BLD: 0 K/UL (ref 0–0.01)
NRBC BLD-RTO: 0 PER 100 WBC
PLATELET # BLD AUTO: 349 K/UL (ref 150–400)
PMV BLD AUTO: 10.9 FL (ref 8.9–12.9)
POTASSIUM SERPL-SCNC: 4.5 MMOL/L (ref 3.5–5.1)
PROT SERPL-MCNC: 7.5 G/DL (ref 6.4–8.2)
PSA SERPL-MCNC: 1.5 NG/ML (ref 0.01–4)
RBC # BLD AUTO: 5.72 M/UL (ref 4.1–5.7)
SODIUM SERPL-SCNC: 140 MMOL/L (ref 136–145)
TRIGL SERPL-MCNC: 145 MG/DL
VLDLC SERPL CALC-MCNC: 29 MG/DL
WBC # BLD AUTO: 8 K/UL (ref 4.1–11.1)

## 2025-05-29 PROCEDURE — G0439 PPPS, SUBSEQ VISIT: HCPCS | Performed by: NURSE PRACTITIONER

## 2025-05-29 PROCEDURE — 1160F RVW MEDS BY RX/DR IN RCRD: CPT | Performed by: NURSE PRACTITIONER

## 2025-05-29 PROCEDURE — 99214 OFFICE O/P EST MOD 30 MIN: CPT | Performed by: NURSE PRACTITIONER

## 2025-05-29 PROCEDURE — 3079F DIAST BP 80-89 MM HG: CPT | Performed by: NURSE PRACTITIONER

## 2025-05-29 PROCEDURE — 1125F AMNT PAIN NOTED PAIN PRSNT: CPT | Performed by: NURSE PRACTITIONER

## 2025-05-29 PROCEDURE — 1159F MED LIST DOCD IN RCRD: CPT | Performed by: NURSE PRACTITIONER

## 2025-05-29 PROCEDURE — 3074F SYST BP LT 130 MM HG: CPT | Performed by: NURSE PRACTITIONER

## 2025-05-29 PROCEDURE — 1123F ACP DISCUSS/DSCN MKR DOCD: CPT | Performed by: NURSE PRACTITIONER

## 2025-05-29 PROCEDURE — G0296 VISIT TO DETERM LDCT ELIG: HCPCS | Performed by: NURSE PRACTITIONER

## 2025-05-29 RX ORDER — ALBUTEROL SULFATE 90 UG/1
INHALANT RESPIRATORY (INHALATION)
Qty: 3 EACH | Refills: 2 | Status: SHIPPED | OUTPATIENT
Start: 2025-05-29

## 2025-05-29 RX ORDER — AMLODIPINE BESYLATE 5 MG/1
5 TABLET ORAL DAILY
Qty: 90 TABLET | Refills: 2 | Status: SHIPPED | OUTPATIENT
Start: 2025-05-29

## 2025-05-29 RX ORDER — LANCETS 33 GAUGE
EACH MISCELLANEOUS
Qty: 100 EACH | Refills: 2 | Status: SHIPPED | OUTPATIENT
Start: 2025-05-29

## 2025-05-29 RX ORDER — FUROSEMIDE 20 MG/1
20 TABLET ORAL DAILY
Qty: 90 TABLET | Refills: 2 | Status: SHIPPED | OUTPATIENT
Start: 2025-05-29

## 2025-05-29 RX ORDER — SILDENAFIL 100 MG/1
100 TABLET, FILM COATED ORAL PRN
COMMUNITY
End: 2025-05-29 | Stop reason: SDUPTHER

## 2025-05-29 RX ORDER — BLOOD-GLUCOSE METER
EACH MISCELLANEOUS
Qty: 1 KIT | Refills: 2 | Status: SHIPPED | OUTPATIENT
Start: 2025-05-29

## 2025-05-29 RX ORDER — TERAZOSIN 10 MG/1
10 CAPSULE ORAL NIGHTLY
Qty: 90 CAPSULE | Refills: 2 | Status: SHIPPED | OUTPATIENT
Start: 2025-05-29

## 2025-05-29 RX ORDER — NAPROXEN 500 MG/1
500 TABLET ORAL
Qty: 100 TABLET | Refills: 0 | Status: CANCELLED | OUTPATIENT
Start: 2025-05-29

## 2025-05-29 RX ORDER — SILDENAFIL 100 MG/1
100 TABLET, FILM COATED ORAL PRN
Qty: 30 TABLET | Refills: 2 | Status: SHIPPED | OUTPATIENT
Start: 2025-05-29

## 2025-05-29 RX ORDER — FUROSEMIDE 20 MG/1
20 TABLET ORAL DAILY
COMMUNITY
Start: 2024-10-06 | End: 2025-05-29 | Stop reason: SDUPTHER

## 2025-05-29 RX ORDER — ATORVASTATIN CALCIUM 10 MG/1
10 TABLET, FILM COATED ORAL DAILY
Qty: 90 TABLET | Refills: 2 | Status: SHIPPED | OUTPATIENT
Start: 2025-05-29

## 2025-05-29 RX ORDER — BLOOD SUGAR DIAGNOSTIC
STRIP MISCELLANEOUS
Qty: 100 STRIP | Refills: 2 | Status: SHIPPED | OUTPATIENT
Start: 2025-05-29

## 2025-05-29 RX ORDER — TAMSULOSIN HYDROCHLORIDE 0.4 MG/1
0.4 CAPSULE ORAL DAILY
COMMUNITY
End: 2025-05-29 | Stop reason: CLARIF

## 2025-05-29 RX ORDER — PEN NEEDLE, DIABETIC 31 GX5/16"
NEEDLE, DISPOSABLE MISCELLANEOUS
Qty: 100 EACH | Refills: 2 | Status: SHIPPED | OUTPATIENT
Start: 2025-05-29

## 2025-05-29 RX ORDER — HYDROXYZINE HYDROCHLORIDE 25 MG/1
25 TABLET, FILM COATED ORAL
Status: CANCELLED | OUTPATIENT
Start: 2025-05-29

## 2025-05-29 SDOH — ECONOMIC STABILITY: FOOD INSECURITY: WITHIN THE PAST 12 MONTHS, YOU WORRIED THAT YOUR FOOD WOULD RUN OUT BEFORE YOU GOT MONEY TO BUY MORE.: NEVER TRUE

## 2025-05-29 SDOH — ECONOMIC STABILITY: FOOD INSECURITY: WITHIN THE PAST 12 MONTHS, THE FOOD YOU BOUGHT JUST DIDN'T LAST AND YOU DIDN'T HAVE MONEY TO GET MORE.: NEVER TRUE

## 2025-05-29 ASSESSMENT — PATIENT HEALTH QUESTIONNAIRE - PHQ9
2. FEELING DOWN, DEPRESSED OR HOPELESS: NOT AT ALL
1. LITTLE INTEREST OR PLEASURE IN DOING THINGS: NOT AT ALL
SUM OF ALL RESPONSES TO PHQ QUESTIONS 1-9: 0
2. FEELING DOWN, DEPRESSED OR HOPELESS: NOT AT ALL
1. LITTLE INTEREST OR PLEASURE IN DOING THINGS: NOT AT ALL
SUM OF ALL RESPONSES TO PHQ QUESTIONS 1-9: 0

## 2025-05-29 NOTE — PATIENT INSTRUCTIONS
your doctor if you need help losing weight.     Try to get 7 to 9 hours of sleep each night.     Limit alcohol to 2 drinks a day for men and 1 drink a day for women. Too much alcohol can cause health problems.     Manage other health problems such as diabetes, high blood pressure, and high cholesterol. If you think you may have a problem with alcohol or drug use, talk to your doctor.   Medicines    Take your medicines exactly as prescribed. Call your doctor if you think you are having a problem with your medicine.     If your doctor recommends aspirin, take the amount directed each day. Make sure you take aspirin and not another kind of pain reliever, such as acetaminophen (Tylenol).   When should you call for help?   Call 911 if you have symptoms of a heart attack. These may include:    Chest pain or pressure, or a strange feeling in the chest.     Sweating.     Shortness of breath.     Pain, pressure, or a strange feeling in the back, neck, jaw, or upper belly or in one or both shoulders or arms.     Lightheadedness or sudden weakness.     A fast or irregular heartbeat.   After you call 911, the  may tell you to chew 1 adult-strength or 2 to 4 low-dose aspirin. Wait for an ambulance. Do not try to drive yourself.  Watch closely for changes in your health, and be sure to contact your doctor if you have any problems.  Where can you learn more?  Go to https://www.[employer].net/patientEd and enter F075 to learn more about \"A Healthy Heart: Care Instructions.\"  Current as of: July 31, 2024  Content Version: 14.4  © 9588-1609 [employer].   Care instructions adapted under license by [employer]. If you have questions about a medical condition or this instruction, always ask your healthcare professional. [employer], disclaims any warranty or liability for your use of this information.    Personalized Preventive Plan for Elie LUPIS Connelly Jr. - 5/29/2025  Medicare offers a range of

## 2025-05-29 NOTE — PROGRESS NOTES
Medicare Annual Wellness Visit    Elie Connelly  is here for Medicare AWV, Medication Refill, and Referral - General (Lung screening )    Assessment & Plan   Medicare annual wellness visit, subsequent  Pulmonary emphysema, unspecified emphysema type (HCC)  -     albuterol sulfate HFA (PROVENTIL;VENTOLIN;PROAIR) 108 (90 Base) MCG/ACT inhaler; USE 1 INHALATION BY MOUTH EVERY  4 HOURS AS NEEDED FOR SOB, Disp-3 each, R-2Normal  Primary hypertension  -     amLODIPine (NORVASC) 5 MG tablet; Take 1 tablet by mouth daily, Disp-90 tablet, R-2Normal  -     furosemide (LASIX) 20 MG tablet; Take 1 tablet by mouth daily, Disp-90 tablet, R-2Normal  -     Comprehensive Metabolic Panel; Future  -     CBC with Auto Differential; Future  Pure hypercholesterolemia  -     atorvastatin (LIPITOR) 10 MG tablet; Take 1 tablet by mouth daily, Disp-90 tablet, R-2Normal  -     Lipid Panel; Future  Benign prostatic hyperplasia with urinary hesitancy  -     terazosin (HYTRIN) 10 MG capsule; Take 1 capsule by mouth nightly, Disp-90 capsule, R-2Please send a replace/new response with 100-Day Supply if appropriate to maximize member benefit. Requesting 1 year supply.Normal  Personal history of tobacco use  -     MT VISIT TO DISCUSS LUNG CA SCREEN W LDCT  -     CT Lung Screen (Initial/Annual/Baseline); Future  Diabetes mellitus type 2, diet-controlled (HCC)  -     Blood Glucose Monitoring Suppl (ONE TOUCH ULTRA 2) w/Device KIT; Disp-1 kit, R-2, NormalCHECK BLOOD GLUCOSE ONCE DAILY  AS DIRECTED  -     Lancets (ONETOUCH DELICA PLUS [id number]) MISC; CHECK BLOOD GLUCOSE ONCE DAILY  AS DIRECTED, Disp-100 each, R-2Please send a replace/new response with 100-Day Supply if appropriate to maximize member benefit. Requesting 1 year supply.Normal  -     blood glucose test strips (ONETOUCH ULTRA) strip; CHECK BLOOD GLUCOSE ONCE DAILY  AS DIRECTED, Disp-100 strip, R-2Please send a replace/new response with 100-Day Supply if appropriate to maximize member

## 2025-05-30 ENCOUNTER — RESULTS FOLLOW-UP (OUTPATIENT)
Age: 65
End: 2025-05-30

## 2025-06-19 ENCOUNTER — HOSPITAL ENCOUNTER (OUTPATIENT)
Facility: HOSPITAL | Age: 65
Discharge: HOME | End: 2025-06-22
Payer: MEDICARE

## 2025-06-19 DIAGNOSIS — Z87.891 PERSONAL HISTORY OF TOBACCO USE: ICD-10-CM

## 2025-06-19 PROCEDURE — 71271 CT THORAX LUNG CANCER SCR C-: CPT

## 2025-06-23 ENCOUNTER — TELEPHONE (OUTPATIENT)
Age: 65
End: 2025-06-23

## 2025-06-23 DIAGNOSIS — J43.9 PULMONARY EMPHYSEMA, UNSPECIFIED EMPHYSEMA TYPE (HCC): ICD-10-CM

## 2025-06-23 DIAGNOSIS — N40.1 BENIGN PROSTATIC HYPERPLASIA WITH URINARY HESITANCY: Primary | ICD-10-CM

## 2025-06-23 DIAGNOSIS — R39.11 BENIGN PROSTATIC HYPERPLASIA WITH URINARY HESITANCY: Primary | ICD-10-CM

## 2025-06-23 DIAGNOSIS — E11.9 DIABETES MELLITUS TYPE 2, DIET-CONTROLLED (HCC): ICD-10-CM

## 2025-06-24 DIAGNOSIS — N40.1 BENIGN PROSTATIC HYPERPLASIA WITH URINARY HESITANCY: Primary | ICD-10-CM

## 2025-06-24 DIAGNOSIS — R39.11 BENIGN PROSTATIC HYPERPLASIA WITH URINARY HESITANCY: Primary | ICD-10-CM

## 2025-06-24 RX ORDER — LANCETS 33 GAUGE
EACH MISCELLANEOUS
Qty: 100 EACH | Refills: 2 | Status: SHIPPED | OUTPATIENT
Start: 2025-06-24

## 2025-06-24 RX ORDER — ALBUTEROL SULFATE 90 UG/1
INHALANT RESPIRATORY (INHALATION)
Qty: 3 EACH | Refills: 2 | Status: SHIPPED | OUTPATIENT
Start: 2025-06-24 | End: 2025-07-07 | Stop reason: SDUPTHER

## 2025-06-24 RX ORDER — TAMSULOSIN HYDROCHLORIDE 0.4 MG/1
0.4 CAPSULE ORAL DAILY
Qty: 30 CAPSULE | Refills: 0 | Status: SHIPPED | OUTPATIENT
Start: 2025-06-24 | End: 2025-06-24 | Stop reason: ALTCHOICE

## 2025-06-24 RX ORDER — TAMSULOSIN HYDROCHLORIDE 0.4 MG/1
0.4 CAPSULE ORAL DAILY
Qty: 30 CAPSULE | Refills: 2 | Status: SHIPPED | OUTPATIENT
Start: 2025-06-24 | End: 2025-07-07 | Stop reason: SDUPTHER

## 2025-06-24 RX ORDER — PEN NEEDLE, DIABETIC 31 GX5/16"
NEEDLE, DISPOSABLE MISCELLANEOUS
Qty: 100 EACH | Refills: 2 | Status: SHIPPED | OUTPATIENT
Start: 2025-06-24

## 2025-07-07 DIAGNOSIS — N40.1 BENIGN PROSTATIC HYPERPLASIA WITH URINARY HESITANCY: ICD-10-CM

## 2025-07-07 DIAGNOSIS — E11.9 DIABETES MELLITUS TYPE 2, DIET-CONTROLLED (HCC): Primary | ICD-10-CM

## 2025-07-07 DIAGNOSIS — J43.9 PULMONARY EMPHYSEMA, UNSPECIFIED EMPHYSEMA TYPE (HCC): ICD-10-CM

## 2025-07-07 DIAGNOSIS — R39.11 BENIGN PROSTATIC HYPERPLASIA WITH URINARY HESITANCY: ICD-10-CM

## 2025-07-09 RX ORDER — ALBUTEROL SULFATE 90 UG/1
INHALANT RESPIRATORY (INHALATION)
Qty: 3 EACH | Refills: 2 | Status: SHIPPED | OUTPATIENT
Start: 2025-07-09

## 2025-07-09 RX ORDER — TAMSULOSIN HYDROCHLORIDE 0.4 MG/1
0.4 CAPSULE ORAL DAILY
Qty: 90 CAPSULE | Refills: 2 | Status: SHIPPED | OUTPATIENT
Start: 2025-07-09

## 2025-07-09 RX ORDER — LANCETS 30 GAUGE
1 EACH MISCELLANEOUS 3 TIMES DAILY
Qty: 100 EACH | Refills: 2 | Status: SHIPPED | OUTPATIENT
Start: 2025-07-09

## 2025-07-09 RX ORDER — BLOOD PRESSURE TEST KIT
1 KIT MISCELLANEOUS DAILY
Qty: 100 EACH | Refills: 2 | Status: SHIPPED | OUTPATIENT
Start: 2025-07-09

## 2025-07-11 ENCOUNTER — TELEPHONE (OUTPATIENT)
Age: 65
End: 2025-07-11

## 2025-07-11 NOTE — TELEPHONE ENCOUNTER
Called patient's insurance co.to confirm Glucose monitor has been approved and aletar is notified. The Alcohol pads was not approved and be OTC for 1.99 per pharmacist stated.

## 2025-07-11 NOTE — TELEPHONE ENCOUNTER
Pt calling per [facility]     States a authorization is needed for alcohol prep pads & diabetic testing meter         Best number to reach him is [phone]

## 2025-08-12 ENCOUNTER — OFFICE VISIT (OUTPATIENT)
Age: 65
End: 2025-08-12
Payer: MEDICARE

## 2025-08-12 VITALS
DIASTOLIC BLOOD PRESSURE: 70 MMHG | SYSTOLIC BLOOD PRESSURE: 100 MMHG | HEIGHT: 72 IN | BODY MASS INDEX: 32.51 KG/M2 | HEART RATE: 85 BPM | WEIGHT: 240 LBS | TEMPERATURE: 98.2 F | OXYGEN SATURATION: 90 % | RESPIRATION RATE: 20 BRPM

## 2025-08-12 DIAGNOSIS — E11.9 DIABETES MELLITUS TYPE 2, DIET-CONTROLLED (HCC): Primary | ICD-10-CM

## 2025-08-12 LAB — HBA1C MFR BLD: 6.6 %

## 2025-08-12 PROCEDURE — 3074F SYST BP LT 130 MM HG: CPT | Performed by: NURSE PRACTITIONER

## 2025-08-12 PROCEDURE — 1159F MED LIST DOCD IN RCRD: CPT | Performed by: NURSE PRACTITIONER

## 2025-08-12 PROCEDURE — 99213 OFFICE O/P EST LOW 20 MIN: CPT | Performed by: NURSE PRACTITIONER

## 2025-08-12 PROCEDURE — 3078F DIAST BP <80 MM HG: CPT | Performed by: NURSE PRACTITIONER

## 2025-08-12 PROCEDURE — 1126F AMNT PAIN NOTED NONE PRSNT: CPT | Performed by: NURSE PRACTITIONER

## 2025-08-12 PROCEDURE — PBSHW AMB POC HEMOGLOBIN A1C: Performed by: NURSE PRACTITIONER

## 2025-08-12 PROCEDURE — 1123F ACP DISCUSS/DSCN MKR DOCD: CPT | Performed by: NURSE PRACTITIONER

## 2025-08-12 PROCEDURE — 3044F HG A1C LEVEL LT 7.0%: CPT | Performed by: NURSE PRACTITIONER

## 2025-08-12 PROCEDURE — 83036 HEMOGLOBIN GLYCOSYLATED A1C: CPT | Performed by: NURSE PRACTITIONER

## 2025-08-12 ASSESSMENT — ENCOUNTER SYMPTOMS
GASTROINTESTINAL NEGATIVE: 1
RESPIRATORY NEGATIVE: 1

## 2025-08-22 ENCOUNTER — TELEPHONE (OUTPATIENT)
Age: 65
End: 2025-08-22

## (undated) DEVICE — (D)PREP SKN CHLRAPRP APPL 26ML -- CONVERT TO ITEM 371833

## (undated) DEVICE — Z DISCONTINUED PER MEDLINE LINE GAS SAMPLING O2/CO2 LNG AD 13 FT NSL W/ TBNG FILTERLINE

## (undated) DEVICE — NEEDLE SPNL L4.75IN OD25GA QNCKE TYP SPINOCAN

## (undated) DEVICE — NEEDLE HYPO 18GA L1.5IN PNK S STL HUB POLYPR SHLD REG BVL

## (undated) DEVICE — SYR 5ML 1/5 GRAD LL NSAF LF --

## (undated) DEVICE — Device

## (undated) DEVICE — SNARE ENDOSCP M L240CM W27MM SHTH DIA2.4MM CHN 2.8MM OVL

## (undated) DEVICE — STRAINER URIN CALC RNL MSH -- CONVERT TO ITEM 357634

## (undated) DEVICE — TOWEL SURG W17XL27IN STD BLU COT NONFENESTRATED PREWASHED

## (undated) DEVICE — SYR 10ML LUER LOK 1/5ML GRAD --

## (undated) DEVICE — NEONATAL-ADULT SPO2 SENSOR: Brand: NELLCOR

## (undated) DEVICE — BASIN EMSIS 16OZ GRAPHITE PLAS KID SHP MOLD GRAD FOR ORAL

## (undated) DEVICE — MARKER,SKIN,WI/RULER AND LABELS: Brand: MEDLINE

## (undated) DEVICE — POLYLINED TOWEL: Brand: CONVERTORS

## (undated) DEVICE — SYR 3ML LL TIP 1/10ML GRAD --

## (undated) DEVICE — CONTAINER SPEC 20 ML LID NEUT BUFF FORMALIN 10 % POLYPR STS

## (undated) DEVICE — SET EXTN PRIMING 0.59ML 8.5IN 1.55LB PRSS RATE MINIBOR PUR

## (undated) DEVICE — NEEDLE HYPO 25GA L1.5IN BVL ORIENTED ECLIPSE

## (undated) DEVICE — SET ADMIN 16ML TBNG L100IN 2 Y INJ SITE IV PIGGY BK DISP

## (undated) DEVICE — ADULT SPO2 SENSOR: Brand: NELLCOR

## (undated) DEVICE — TOWEL 4 PLY TISS 19X30 SUE WHT

## (undated) DEVICE — SOLIDIFIER MEDC 1200ML -- CONVERT TO 356117

## (undated) DEVICE — CATH IV AUTOGRD BC PNK 20GA 25 -- INSYTE

## (undated) DEVICE — 1200 GUARD II KIT W/5MM TUBE W/O VAC TUBE: Brand: GUARDIAN

## (undated) DEVICE — STERILE POLYISOPRENE POWDER-FREE SURGICAL GLOVES: Brand: PROTEXIS

## (undated) DEVICE — KENDALL RADIOLUCENT FOAM MONITORING ELECTRODE RECTANGULAR SHAPE: Brand: KENDALL

## (undated) DEVICE — TRAP SUC MUCOUS 70ML -- MEDICHOICE MEDLINE